# Patient Record
Sex: FEMALE | Race: WHITE | NOT HISPANIC OR LATINO | Employment: STUDENT | URBAN - METROPOLITAN AREA
[De-identification: names, ages, dates, MRNs, and addresses within clinical notes are randomized per-mention and may not be internally consistent; named-entity substitution may affect disease eponyms.]

---

## 2017-06-22 ENCOUNTER — ALLSCRIPTS OFFICE VISIT (OUTPATIENT)
Dept: OTHER | Facility: OTHER | Age: 27
End: 2017-06-22

## 2017-06-22 DIAGNOSIS — Z72.51 HIGH RISK HETEROSEXUAL BEHAVIOR: ICD-10-CM

## 2017-06-22 DIAGNOSIS — N91.2 AMENORRHEA: ICD-10-CM

## 2018-01-09 NOTE — MISCELLANEOUS
Message  Return to work or school:   Linn Carrera is under my professional care  She was seen in my office on 4/25/16   She is able to return to work on  4/30/16       BETSY Mcnair       Signatures   Electronically signed by : Braulio Beauchamp, Max Hernandez;  Apr 28 2016  1:01PM EST                       (Author)

## 2018-01-17 NOTE — PROGRESS NOTES
Assessment    1  Depression (311) (F32 9)   2  Anxiety (300 00) (F41 9)    Plan  Anxiety    · ClonazePAM 0 5 MG Oral Tablet; TAKE 1 TABLET TWICE DAILY AS NEEDED  Anxiety, Depression    · Citalopram Hydrobromide 10 MG Oral Tablet (CeleXA); TAKE 1 TABLET DAILY    Discussion/Summary  Discussion Summary:   Patient started on Celexa 10 mg  start with 1/2 daily, for one week then one a day  RTO in 4 weeks  Discussed stress relieving activities, written info given to patient  Refilled Klonopin, for 2 months  Chief Complaint  Chief Complaint Free Text Note Form: Pt here today to go over recent lab results and to discuss possibly changing her anxiety medication, she believes it is not working she stated that her anxiety is getting worse  History of Present Illness  HPI: 26year old AA female presents with c/o increased anxiety even with medication, taking Klonopin daily, but still wakes up panicked, and feels tight in the chest, with SOB  Still does not feel like anxiety under control with medication  Has not been able to find/get therapist, is on a waiting list  Has a 10year old boy  Crying lately, has trouble sleeping at night  Tries to pray, still feels low, past weighing heavy on patient  Patient does not get along with dad, and step mom  Patient 's mom  when patient was 9, close to [facility]  Review of Systems  Complete-Female:   ENT: no earache and no sore throat  Cardiovascular: no chest pain and no palpitations  Respiratory: no shortness of breath and no cough  Gastrointestinal: no abdominal pain, no nausea and no vomiting  Psychiatric: anxiety, sleep disturbances and depression, but not suicidal       Active Problems    1  Anxiety (300 00) (F41 9)   2  Depression (311) (F32 9)   3  Knee pain, acute, left (689 46) (M29 252)    Family History    1  Family history of Anxiety and depression   2  Family history of Breast cancer (174 9) (S41 377)   3   Family history of malignant neoplasm (V16 9) (Z80 9)    4  Family history of essential hypertension (V17 49) (Z82 49)   5  Family history of myocardial infarction (V17 3) (Z82 49)    Social History    · Never smoker   · Non drinker / no alcohol use    Current Meds   1  ClonazePAM 0 5 MG Oral Tablet; TAKE 1 TABLET TWICE DAILY AS NEEDED; Therapy: [id number] to (Evaluate:[id number]); Last Rx:[id number] Ordered    Allergies    1  No Known Drug Allergies    Vitals  Vital Signs [Data Includes: Current Encounter]    Recorded: [id number] 27:81UN   Systolic 588, LUE, Sitting   Diastolic 64, LUE, Sitting   Height 5 ft 6 in   Weight 134 lb    BMI Calculated 21 63   BSA Calculated 1 69     Physical Exam    Constitutional   General appearance: No acute distress, well appearing and well nourished  Pulmonary   Respiratory effort: No increased work of breathing or signs of respiratory distress  Auscultation of lungs: Clear to auscultation  Cardiovascular   Auscultation of heart: Normal rate and rhythm, normal S1 and S2, without murmurs  Examination of extremities for edema and/or varicosities: Normal     Musculoskeletal   Gait and station: Normal     Digits and nails: Normal without clubbing or cyanosis      Inspection/palpation of joints, bones, and muscles: Normal     Psychiatric   Orientation to person, place, and time: Normal     Mood and affect: Normal          Future Appointments    Date/Time Provider Specialty Site   02/15/2016 02:00 PM Román Marsh, [facility]     Signatures   Electronically signed by : Devin Soulier, [facility]; Yoav 15 2016 11:17AM EST                       (Author)    Electronically signed by : Armida Velázquez DO; Yoav 15 2016  1:08PM EST                       (Author)

## 2018-01-25 RX ORDER — CLONAZEPAM 0.5 MG/1
0.5 TABLET ORAL AS NEEDED
Refills: 0 | COMMUNITY
Start: 2017-12-19 | End: 2018-02-19 | Stop reason: SDUPTHER

## 2018-01-26 PROBLEM — N91.2 AMENORRHEA: Status: ACTIVE | Noted: 2017-06-22

## 2018-02-19 ENCOUNTER — OFFICE VISIT (OUTPATIENT)
Dept: FAMILY MEDICINE CLINIC | Facility: HOSPITAL | Age: 28
End: 2018-02-19
Payer: COMMERCIAL

## 2018-02-19 VITALS — BODY MASS INDEX: 23.57 KG/M2 | WEIGHT: 146 LBS | SYSTOLIC BLOOD PRESSURE: 100 MMHG | DIASTOLIC BLOOD PRESSURE: 70 MMHG

## 2018-02-19 DIAGNOSIS — Z00.00 HEALTH CARE MAINTENANCE: ICD-10-CM

## 2018-02-19 DIAGNOSIS — F41.9 ANXIETY: Primary | ICD-10-CM

## 2018-02-19 PROCEDURE — 99213 OFFICE O/P EST LOW 20 MIN: CPT | Performed by: PHYSICIAN ASSISTANT

## 2018-02-19 RX ORDER — CLONAZEPAM 0.5 MG/1
0.5 TABLET ORAL 3 TIMES DAILY
Qty: 90 TABLET | Refills: 2 | Status: SHIPPED | OUTPATIENT
Start: 2018-02-19 | End: 2018-05-16 | Stop reason: SDUPTHER

## 2018-02-19 NOTE — PROGRESS NOTES
Assessment/Plan:       Diagnoses and all orders for this visit:    Anxiety  -     clonazePAM (KlonoPIN) 0 5 mg tablet; Take 1 tablet (0 5 mg total) by mouth 3 (three) times a day  -     CBC and differential; Future  -     Comprehensive metabolic panel; Future  -     Lipid panel; Future  -     TSH, 3rd generation with T4 reflex; Future  -     Comprehensive metabolic panel  -     Lipid panel  -     TSH, 3rd generation with T4 reflex    Health care maintenance  -     CBC and differential; Future  -     Comprehensive metabolic panel; Future  -     Lipid panel; Future  -     TSH, 3rd generation with T4 reflex; Future  -     Comprehensive metabolic panel  -     Lipid panel  -     TSH, 3rd generation with T4 reflex        Subjective:      Patient ID: Day Goodwin is a 33 y o  female  33year old AA female presents for refills  Had bronchitis in August and went to urgent care for treatment  Presents with child today for follow up  No acute complaints  Patient is currently a full time student, doing very well, anxiety under control  [facility] to [facility] exams, last pap August 2017, LNMP- July 2017, due to getting Depo injections  Patient trying to slowly come off or reduce the amount of Klonopin used, trying to take one pill bid, and more so prn  Review of Systems   Constitutional: Negative  HENT: Negative  Eyes: Negative  Respiratory: Negative  Psychiatric/Behavioral: Negative for agitation, behavioral problems, confusion, decreased concentration, dysphoric mood, hallucinations, self-injury, sleep disturbance and suicidal ideas  The patient is nervous/anxious  The patient is not hyperactive  Objective: There were no vitals taken for this visit  Physical Exam   Constitutional: She is oriented to person, place, and time  She appears well-developed and well-nourished  No distress  HENT:   Head: Normocephalic and atraumatic     Eyes: Conjunctivae and EOM are normal  Right eye exhibits no discharge  Left eye exhibits no discharge  No scleral icterus  Cardiovascular: Normal rate, regular rhythm and normal heart sounds  No murmur heard  Pulmonary/Chest: No respiratory distress  She has no wheezes  She has no rales  Musculoskeletal: Normal range of motion  Neurological: She is alert and oriented to person, place, and time  Skin: She is not diaphoretic  Psychiatric: She has a normal mood and affect   Her behavior is normal  Judgment and thought content normal

## 2018-02-19 NOTE — PATIENT INSTRUCTIONS
Recommend routine complete fasting blood work  Given refills for Klonopin 1 tablet 3 times a day as needed number 90 with 2 refills  Patient to call office 1 week after blood drawn for results

## 2018-05-16 ENCOUNTER — TELEPHONE (OUTPATIENT)
Dept: FAMILY MEDICINE CLINIC | Facility: HOSPITAL | Age: 28
End: 2018-05-16

## 2018-05-16 DIAGNOSIS — F41.9 ANXIETY: ICD-10-CM

## 2018-05-16 RX ORDER — CLONAZEPAM 0.5 MG/1
0.5 TABLET ORAL 3 TIMES DAILY
Qty: 90 TABLET | Refills: 1 | OUTPATIENT
Start: 2018-05-16

## 2018-05-18 RX ORDER — CLONAZEPAM 0.5 MG/1
0.5 TABLET ORAL 3 TIMES DAILY
Qty: 90 TABLET | Refills: 0 | OUTPATIENT
Start: 2018-05-18 | End: 2018-06-18 | Stop reason: SDUPTHER

## 2018-06-18 ENCOUNTER — OFFICE VISIT (OUTPATIENT)
Dept: FAMILY MEDICINE CLINIC | Facility: HOSPITAL | Age: 28
End: 2018-06-18
Payer: COMMERCIAL

## 2018-06-18 VITALS
HEIGHT: 66 IN | WEIGHT: 150 LBS | BODY MASS INDEX: 24.11 KG/M2 | DIASTOLIC BLOOD PRESSURE: 64 MMHG | SYSTOLIC BLOOD PRESSURE: 98 MMHG | HEART RATE: 98 BPM

## 2018-06-18 DIAGNOSIS — F41.9 ANXIETY: ICD-10-CM

## 2018-06-18 PROCEDURE — 3725F SCREEN DEPRESSION PERFORMED: CPT | Performed by: PHYSICIAN ASSISTANT

## 2018-06-18 PROCEDURE — 99213 OFFICE O/P EST LOW 20 MIN: CPT | Performed by: PHYSICIAN ASSISTANT

## 2018-06-18 RX ORDER — CLONAZEPAM 0.5 MG/1
0.5 TABLET ORAL 3 TIMES DAILY
Qty: 90 TABLET | Refills: 5 | Status: SHIPPED | OUTPATIENT
Start: 2018-06-18 | End: 2018-07-16 | Stop reason: SDUPTHER
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
## 2018-07-16 ENCOUNTER — OFFICE VISIT (OUTPATIENT)
Dept: FAMILY MEDICINE CLINIC | Facility: HOSPITAL | Age: 28
End: 2018-07-16
Payer: COMMERCIAL

## 2018-07-16 VITALS
HEIGHT: 66 IN | WEIGHT: 150 LBS | BODY MASS INDEX: 24.11 KG/M2 | DIASTOLIC BLOOD PRESSURE: 70 MMHG | HEART RATE: 79 BPM | SYSTOLIC BLOOD PRESSURE: 96 MMHG

## 2018-07-16 DIAGNOSIS — F41.9 ANXIETY: ICD-10-CM

## 2018-07-16 DIAGNOSIS — F32.A DEPRESSION, UNSPECIFIED DEPRESSION TYPE: Primary | ICD-10-CM

## 2018-07-16 DIAGNOSIS — R45.1 AGITATION: ICD-10-CM

## 2018-07-16 PROCEDURE — 3008F BODY MASS INDEX DOCD: CPT | Performed by: PHYSICIAN ASSISTANT

## 2018-07-16 PROCEDURE — 99214 OFFICE O/P EST MOD 30 MIN: CPT | Performed by: PHYSICIAN ASSISTANT

## 2018-07-16 RX ORDER — CLONIDINE HYDROCHLORIDE 0.1 MG/1
0.1 TABLET ORAL
Qty: 30 TABLET | Refills: 2 | Status: SHIPPED | OUTPATIENT
Start: 2018-07-16 | End: 2018-11-05 | Stop reason: SDUPTHER

## 2018-07-16 RX ORDER — CLONAZEPAM 0.5 MG/1
0.5 TABLET ORAL 3 TIMES DAILY
Qty: 90 TABLET | Refills: 3 | Status: SHIPPED | OUTPATIENT
Start: 2018-07-16 | End: 2018-08-13 | Stop reason: SDUPTHER

## 2018-07-16 RX ORDER — BUPROPION HYDROCHLORIDE 150 MG/1
150 TABLET ORAL DAILY
Qty: 30 TABLET | Refills: 2 | Status: SHIPPED | OUTPATIENT
Start: 2018-07-16 | End: 2018-10-01 | Stop reason: SDUPTHER

## 2018-07-16 NOTE — PATIENT INSTRUCTIONS
Discussed different medications for sx  Follow up with [facility] to have forms filled out  Start Wellbutrin  mg  Qam, and prn Clonidine for increased agitation  Continue Klonopin

## 2018-07-16 NOTE — PROGRESS NOTES
Assessment/Plan:         Diagnoses and all orders for this visit:    Depression, unspecified depression type  -     buPROPion (WELLBUTRIN XL) 150 mg 24 hr tablet; Take 1 tablet (150 mg total) by mouth daily    Agitation  -     cloNIDine (CATAPRES) 0 1 mg tablet; Take 1 tablet (0 1 mg total) by mouth daily at bedtime as needed for high blood pressure One tab  Qhs, prn agitation  Anxiety  -     clonazePAM (KlonoPIN) 0 5 mg tablet; Take 1 tablet (0 5 mg total) by mouth 3 (three) times a day        Subjective:      Patient ID: Kristina Velarde is a 33 y o  female  33year old AA female presents with increasing anxiety  Had panic attack recently- July 2nd ,   and had suicidal thoughts last week, while in classes  Plans to re-start counseling and going to [facility]  Feeling overwhelmed with family stressors, and school/work/single mom responsibilities  Has papers for disability due to mental health issues, wondering if we can fill them out  Plans changes, no longer moving to [facility] [address] [facility]  Anxiety   Symptoms include nausea, nervous/anxious behavior and suicidal ideas  Patient reports no confusion, decreased concentration or shortness of breath  Review of Systems   Constitutional: Positive for appetite change and fatigue  Negative for chills, diaphoresis and fever  Respiratory: Positive for chest tightness  Negative for cough and shortness of breath  Symptoms due to anxiety  Gastrointestinal: Positive for nausea  Negative for abdominal pain and vomiting  Nausea intermittent  Psychiatric/Behavioral: Positive for agitation, self-injury and suicidal ideas  Negative for confusion, decreased concentration and sleep disturbance  The patient is nervous/anxious  Few weeks ago- had thoughts of suicide, not currently            Objective:      BP 96/70   Pulse 79   Ht 5' 6" (1 676 m)   Wt 68 kg (150 lb)   BMI 24 21 kg/m²          Physical Exam Constitutional: She is oriented to person, place, and time  She appears well-developed and well-nourished  No distress  Cardiovascular: Normal rate, regular rhythm and normal heart sounds  Pulmonary/Chest: Effort normal and breath sounds normal  No respiratory distress  She has no wheezes  She has no rales  Neurological: She is alert and oriented to person, place, and time  Skin: She is not diaphoretic  Psychiatric: She has a normal mood and affect   Her behavior is normal  Judgment and thought content normal

## 2018-08-13 DIAGNOSIS — F41.9 ANXIETY: ICD-10-CM

## 2018-08-13 RX ORDER — CLONAZEPAM 0.5 MG/1
0.5 TABLET ORAL 3 TIMES DAILY
Qty: 90 TABLET | Refills: 0 | OUTPATIENT
Start: 2018-08-13 | End: 2018-10-01 | Stop reason: SDUPTHER

## 2018-10-01 ENCOUNTER — OFFICE VISIT (OUTPATIENT)
Dept: FAMILY MEDICINE CLINIC | Facility: HOSPITAL | Age: 28
End: 2018-10-01
Payer: COMMERCIAL

## 2018-10-01 VITALS
BODY MASS INDEX: 24.99 KG/M2 | WEIGHT: 155.5 LBS | OXYGEN SATURATION: 97 % | SYSTOLIC BLOOD PRESSURE: 110 MMHG | HEART RATE: 78 BPM | DIASTOLIC BLOOD PRESSURE: 86 MMHG | TEMPERATURE: 98.7 F | HEIGHT: 66 IN

## 2018-10-01 DIAGNOSIS — F41.9 ANXIETY: ICD-10-CM

## 2018-10-01 DIAGNOSIS — F32.A DEPRESSION, UNSPECIFIED DEPRESSION TYPE: ICD-10-CM

## 2018-10-01 PROCEDURE — 99213 OFFICE O/P EST LOW 20 MIN: CPT | Performed by: PHYSICIAN ASSISTANT

## 2018-10-01 RX ORDER — CLONAZEPAM 0.5 MG/1
0.5 TABLET ORAL 3 TIMES DAILY
Qty: 90 TABLET | Refills: 2 | Status: SHIPPED | OUTPATIENT
Start: 2018-10-01 | End: 2019-01-08 | Stop reason: SDUPTHER

## 2018-10-01 RX ORDER — BUPROPION HYDROCHLORIDE 150 MG/1
150 TABLET ORAL DAILY
Qty: 30 TABLET | Refills: 0 | Status: SHIPPED | OUTPATIENT
Start: 2018-10-01 | End: 2020-08-21 | Stop reason: SDUPTHER

## 2018-10-01 NOTE — PROGRESS NOTES
Assessment/Plan:         Diagnoses and all orders for this visit:    Anxiety  -     clonazePAM (KlonoPIN) 0 5 mg tablet; Take 1 tablet (0 5 mg total) by mouth 3 (three) times a day    Depression, unspecified depression type  -     buPROPion (WELLBUTRIN XL) 150 mg 24 hr tablet; Take 1 tablet (150 mg total) by mouth daily        Subjective:      Patient ID: Makenzie Fraire is a 33 y o  female  33year old AA female presents with c/o worsening anxiety, getting panic attacks past 3 weeks  The Klonopin not helping, anxiety affecting job performance  Feeling less depressed  Has no reason to be anxious  Has chest discomfort due to anxiety, "scared for no reason"  Seeing therapist, using breathing exercises; Not happy with current therapist     Patient is on leave from work due to anxiety, anxiety affecting work performance  Has been out of work since 9/20/18, due to having panic attack at work  Energy overwhelmed and having SOB, at work  Patient has an 6year old son, and has ongoing family/father issues  Anxiety   Symptoms include confusion, decreased concentration, nervous/anxious behavior and shortness of breath  Patient reports no dizziness, nausea or suicidal ideas  Review of Systems   Respiratory: Positive for chest tightness and shortness of breath  Negative for cough  Gastrointestinal: Negative for abdominal pain, nausea and vomiting  Neurological: Negative for dizziness, tremors, light-headedness, numbness and headaches  Psychiatric/Behavioral: Positive for confusion, decreased concentration and dysphoric mood  Negative for self-injury, sleep disturbance and suicidal ideas  The patient is nervous/anxious  Objective:      /86   Pulse 78   Temp 98 7 °F (37 1 °C) (Tympanic)   Ht 5' 6" (1 676 m)   Wt 70 5 kg (155 lb 8 oz)   SpO2 97%   BMI 25 10 kg/m²          Physical Exam   Constitutional: She is oriented to person, place, and time   She appears well-developed and well-nourished  No distress  HENT:   Head: Normocephalic and atraumatic  Cardiovascular: Normal rate, regular rhythm and normal heart sounds  Pulmonary/Chest: Effort normal and breath sounds normal  No respiratory distress  She has no wheezes  She has no rales  Musculoskeletal: Normal range of motion  She exhibits no edema  Neurological: She is alert and oriented to person, place, and time  Skin: She is not diaphoretic  Psychiatric: She has a normal mood and affect  Her behavior is normal  Judgment and thought content normal    Very anxious, and talkative  Nursing note and vitals reviewed

## 2018-10-01 NOTE — PATIENT INSTRUCTIONS
Recommend increasing Klonopin to 2 tabs  In the morning and one at noon  Continue other medications  Note given to return to work on Monday, October 8th  Patient to continue therapy, and work on outlets, and social activities

## 2018-10-02 ENCOUNTER — TELEPHONE (OUTPATIENT)
Dept: FAMILY MEDICINE CLINIC | Facility: HOSPITAL | Age: 28
End: 2018-10-02

## 2018-10-02 NOTE — TELEPHONE ENCOUNTER
Patient told dose, same, just to take 2 tabs  In the morning, and one at noon  Only Clonidine at night/bedtime  Call office after one week if dose adjustment not working for controlling stress

## 2018-11-05 DIAGNOSIS — R45.1 AGITATION: ICD-10-CM

## 2018-11-05 RX ORDER — CLONIDINE HYDROCHLORIDE 0.1 MG/1
0.1 TABLET ORAL
Qty: 30 TABLET | Refills: 1 | Status: SHIPPED | OUTPATIENT
Start: 2018-11-05 | End: 2019-01-08 | Stop reason: SDUPTHER

## 2018-11-07 ENCOUNTER — CLINICAL SUPPORT (OUTPATIENT)
Dept: FAMILY MEDICINE CLINIC | Facility: HOSPITAL | Age: 28
End: 2018-11-07
Payer: COMMERCIAL

## 2018-11-07 DIAGNOSIS — Z11.1 PPD SCREENING TEST: Primary | ICD-10-CM

## 2018-11-07 PROCEDURE — 86580 TB INTRADERMAL TEST: CPT

## 2018-11-28 ENCOUNTER — OFFICE VISIT (OUTPATIENT)
Dept: FAMILY MEDICINE CLINIC | Facility: HOSPITAL | Age: 28
End: 2018-11-28
Payer: COMMERCIAL

## 2018-11-28 VITALS
BODY MASS INDEX: 24.43 KG/M2 | HEIGHT: 66 IN | OXYGEN SATURATION: 99 % | DIASTOLIC BLOOD PRESSURE: 64 MMHG | WEIGHT: 152 LBS | HEART RATE: 87 BPM | SYSTOLIC BLOOD PRESSURE: 110 MMHG | TEMPERATURE: 98.2 F

## 2018-11-28 DIAGNOSIS — J04.0 LARYNGITIS: Primary | ICD-10-CM

## 2018-11-28 DIAGNOSIS — J32.9 SINUSITIS, UNSPECIFIED CHRONICITY, UNSPECIFIED LOCATION: ICD-10-CM

## 2018-11-28 DIAGNOSIS — R05.9 COUGH: ICD-10-CM

## 2018-11-28 PROCEDURE — 3008F BODY MASS INDEX DOCD: CPT | Performed by: PHYSICIAN ASSISTANT

## 2018-11-28 PROCEDURE — 1036F TOBACCO NON-USER: CPT | Performed by: PHYSICIAN ASSISTANT

## 2018-11-28 PROCEDURE — 99213 OFFICE O/P EST LOW 20 MIN: CPT | Performed by: PHYSICIAN ASSISTANT

## 2018-11-28 RX ORDER — AZITHROMYCIN 250 MG/1
TABLET, FILM COATED ORAL
Qty: 6 TABLET | Refills: 0 | Status: SHIPPED | OUTPATIENT
Start: 2018-11-28 | End: 2018-12-02

## 2018-11-28 RX ORDER — BENZONATATE 200 MG/1
200 CAPSULE ORAL 3 TIMES DAILY PRN
Qty: 20 CAPSULE | Refills: 0 | Status: SHIPPED | OUTPATIENT
Start: 2018-11-28 | End: 2018-12-28 | Stop reason: ALTCHOICE

## 2018-11-28 NOTE — PATIENT INSTRUCTIONS
Recommend Zithromax 250 mg  Z pack  Increase water intake, and try saline nasal flushes  Can try tessalon to suppress cough, as needed, (unable to take products with codeine)

## 2018-11-28 NOTE — PROGRESS NOTES
Assessment/Plan:         Diagnoses and all orders for this visit:    Laryngitis  -     azithromycin (ZITHROMAX) 250 mg tablet; Take 2 tablets today then 1 tablet daily x 4 days    Sinusitis, unspecified chronicity, unspecified location  -     azithromycin (ZITHROMAX) 250 mg tablet; Take 2 tablets today then 1 tablet daily x 4 days    Cough  -     benzonatate (TESSALON) 200 MG capsule; Take 1 capsule (200 mg total) by mouth 3 (three) times a day as needed for cough        Subjective:      Patient ID: Denice Casarez is a 33 y o  female  33year old AA female c/o sore throat and congestion since past Saturday night  Has a cough and SOB also, losing voice, and headache  Cough   Associated symptoms include chills, ear pain, a fever, headaches, rhinorrhea, a sore throat and shortness of breath  Shortness of Breath   Associated symptoms include ear pain, a fever, headaches, rhinorrhea and a sore throat  Review of Systems   Constitutional: Positive for chills, diaphoresis, fatigue and fever  HENT: Positive for congestion, ear pain, rhinorrhea, sore throat and voice change  Respiratory: Positive for cough and shortness of breath  Neurological: Positive for light-headedness and headaches  Negative for dizziness  Objective:      /64   Pulse 87   Temp 98 2 °F (36 8 °C) (Tympanic)   Ht 5' 6" (1 676 m)   Wt 68 9 kg (152 lb)   SpO2 99%   BMI 24 53 kg/m²          Physical Exam   Constitutional: She is oriented to person, place, and time  She appears well-developed and well-nourished  No distress  HENT:   Head: Normocephalic and atraumatic  Right Ear: External ear normal    Left Ear: External ear normal    Mouth/Throat: Oropharynx is clear and moist  No oropharyngeal exudate  Turbinates inflamed, left side kissing  Eyes: Conjunctivae and EOM are normal  Right eye exhibits no discharge  Left eye exhibits no discharge  No scleral icterus     Pulmonary/Chest: Effort normal and breath sounds normal  No respiratory distress  She has no wheezes  She has no rales  Neurological: She is alert and oriented to person, place, and time  Skin: She is not diaphoretic  Nursing note and vitals reviewed

## 2018-12-28 ENCOUNTER — OFFICE VISIT (OUTPATIENT)
Dept: FAMILY MEDICINE CLINIC | Facility: HOSPITAL | Age: 28
End: 2018-12-28
Payer: COMMERCIAL

## 2018-12-28 VITALS
SYSTOLIC BLOOD PRESSURE: 116 MMHG | HEART RATE: 86 BPM | BODY MASS INDEX: 24.43 KG/M2 | HEIGHT: 66 IN | DIASTOLIC BLOOD PRESSURE: 80 MMHG | WEIGHT: 152 LBS

## 2018-12-28 DIAGNOSIS — F33.9 EPISODE OF RECURRENT MAJOR DEPRESSIVE DISORDER, UNSPECIFIED DEPRESSION EPISODE SEVERITY (HCC): ICD-10-CM

## 2018-12-28 DIAGNOSIS — F41.9 ANXIETY: Primary | ICD-10-CM

## 2018-12-28 PROCEDURE — 99213 OFFICE O/P EST LOW 20 MIN: CPT | Performed by: PHYSICIAN ASSISTANT

## 2018-12-28 PROCEDURE — 1036F TOBACCO NON-USER: CPT | Performed by: PHYSICIAN ASSISTANT

## 2018-12-28 RX ORDER — CLONAZEPAM 1 MG/1
1 TABLET ORAL 2 TIMES DAILY
Qty: 20 TABLET | Refills: 0 | Status: SHIPPED | OUTPATIENT
Start: 2018-12-28 | End: 2019-02-05 | Stop reason: ALTCHOICE
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
## 2018-12-28 NOTE — PATIENT INSTRUCTIONS
Patient warned about taking more Klonopin  Given short course of Klonopin 1mg  Bid, 10 days, and told to continue 0 5mg tid, (max dose), but to use 1 mg  As needed for back up, and can cut 1 mg  In half if needed  Given excuse for work, to go back 1/7/19  Referred to get therapy/psyc

## 2019-01-03 ENCOUNTER — OFFICE VISIT (OUTPATIENT)
Dept: FAMILY MEDICINE CLINIC | Facility: HOSPITAL | Age: 29
End: 2019-01-03
Payer: COMMERCIAL

## 2019-01-03 VITALS
HEART RATE: 72 BPM | WEIGHT: 151 LBS | TEMPERATURE: 99 F | BODY MASS INDEX: 24.27 KG/M2 | RESPIRATION RATE: 16 BRPM | SYSTOLIC BLOOD PRESSURE: 122 MMHG | DIASTOLIC BLOOD PRESSURE: 78 MMHG | HEIGHT: 66 IN

## 2019-01-03 DIAGNOSIS — J02.9 PHARYNGITIS, UNSPECIFIED ETIOLOGY: Primary | ICD-10-CM

## 2019-01-03 PROCEDURE — 99213 OFFICE O/P EST LOW 20 MIN: CPT | Performed by: PHYSICIAN ASSISTANT

## 2019-01-03 RX ORDER — AMOXICILLIN 500 MG/1
500 TABLET, FILM COATED ORAL
Qty: 30 TABLET | Refills: 0 | Status: SHIPPED | OUTPATIENT
Start: 2019-01-03 | End: 2019-01-13

## 2019-01-03 NOTE — PROGRESS NOTES
Assessment/Plan:         Diagnoses and all orders for this visit:    Pharyngitis, unspecified etiology  -     amoxicillin (AMOXIL) 500 MG tablet; Take 1 tablet (500 mg total) by mouth 3 (three) times a day with meals for 10 days        Subjective:      Patient ID: April Jose is a 33 y o  female  33year old AA female c/o sore throat past 3 days, getting worse  Has a headache  Checked in with [facility], for mental health support/program, due to severe anxiety  Tried Robitussin and otc meds  Without help  Review of Systems   Constitutional: Positive for diaphoresis, fatigue and fever  Negative for chills  HENT: Positive for congestion, ear pain, rhinorrhea, sore throat and voice change  Respiratory: Positive for cough  Negative for shortness of breath  Objective:      /78   Pulse 72   Temp 99 °F (37 2 °C) (Tympanic)   Resp 16   Ht 5' 6" (1 676 m)   Wt 68 5 kg (151 lb)   BMI 24 37 kg/m²          Physical Exam   Constitutional: She is oriented to person, place, and time  She appears well-developed and well-nourished  No distress  HENT:   Head: Normocephalic and atraumatic  Right Ear: External ear normal    Left Ear: External ear normal    Nose: Nose normal    Pharynx erythematous  Eyes: Conjunctivae and EOM are normal  Right eye exhibits no discharge  Left eye exhibits no discharge  No scleral icterus  Pulmonary/Chest: Effort normal and breath sounds normal  No respiratory distress  She has no wheezes  She has no rales  Neurological: She is alert and oriented to person, place, and time  Skin: She is not diaphoretic  Nursing note and vitals reviewed

## 2019-01-08 DIAGNOSIS — F41.9 ANXIETY: ICD-10-CM

## 2019-01-08 DIAGNOSIS — R45.1 AGITATION: ICD-10-CM

## 2019-01-08 RX ORDER — CLONIDINE HYDROCHLORIDE 0.1 MG/1
0.1 TABLET ORAL
Qty: 30 TABLET | Refills: 0 | Status: SHIPPED | OUTPATIENT
Start: 2019-01-08 | End: 2019-02-05 | Stop reason: SDUPTHER

## 2019-01-08 RX ORDER — CLONAZEPAM 0.5 MG/1
0.5 TABLET ORAL 3 TIMES DAILY
Qty: 90 TABLET | Refills: 0 | Status: SHIPPED | OUTPATIENT
Start: 2019-01-08 | End: 2019-02-05 | Stop reason: SDUPTHER

## 2019-02-05 ENCOUNTER — TELEPHONE (OUTPATIENT)
Dept: FAMILY MEDICINE CLINIC | Facility: HOSPITAL | Age: 29
End: 2019-02-05

## 2019-02-05 DIAGNOSIS — F41.9 ANXIETY: ICD-10-CM

## 2019-02-05 DIAGNOSIS — R45.1 AGITATION: ICD-10-CM

## 2019-02-05 RX ORDER — CLONIDINE HYDROCHLORIDE 0.1 MG/1
0.1 TABLET ORAL
Qty: 30 TABLET | Refills: 3 | Status: SHIPPED | OUTPATIENT
Start: 2019-02-05 | End: 2019-02-05 | Stop reason: SDUPTHER

## 2019-02-05 RX ORDER — CLONAZEPAM 0.5 MG/1
0.5 TABLET ORAL 3 TIMES DAILY
Qty: 90 TABLET | Refills: 2 | Status: SHIPPED | OUTPATIENT
Start: 2019-02-05 | End: 2019-03-04 | Stop reason: SDUPTHER

## 2019-02-05 RX ORDER — CLONIDINE HYDROCHLORIDE 0.1 MG/1
0.1 TABLET ORAL
Qty: 30 TABLET | Refills: 3 | Status: SHIPPED | OUTPATIENT
Start: 2019-02-05 | End: 2019-03-04 | Stop reason: SDUPTHER

## 2019-02-05 NOTE — TELEPHONE ENCOUNTER
[facility] calling about Catapres instructions  In the signature line it states 1 at bedtime for hypertension and also 1 at bedtime for agitation  They need to know which one it is for  Please advise

## 2019-03-04 ENCOUNTER — OFFICE VISIT (OUTPATIENT)
Dept: FAMILY MEDICINE CLINIC | Facility: HOSPITAL | Age: 29
End: 2019-03-04
Payer: COMMERCIAL

## 2019-03-04 VITALS
HEART RATE: 80 BPM | SYSTOLIC BLOOD PRESSURE: 98 MMHG | OXYGEN SATURATION: 98 % | HEIGHT: 66 IN | WEIGHT: 153 LBS | DIASTOLIC BLOOD PRESSURE: 60 MMHG | BODY MASS INDEX: 24.59 KG/M2

## 2019-03-04 DIAGNOSIS — R45.1 AGITATION: ICD-10-CM

## 2019-03-04 DIAGNOSIS — F41.9 ANXIETY: ICD-10-CM

## 2019-03-04 PROCEDURE — 99213 OFFICE O/P EST LOW 20 MIN: CPT | Performed by: PHYSICIAN ASSISTANT

## 2019-03-04 PROCEDURE — 1036F TOBACCO NON-USER: CPT | Performed by: PHYSICIAN ASSISTANT

## 2019-03-04 PROCEDURE — 3008F BODY MASS INDEX DOCD: CPT | Performed by: PHYSICIAN ASSISTANT

## 2019-03-04 RX ORDER — CLONAZEPAM 1 MG/1
1 TABLET ORAL 2 TIMES DAILY
Qty: 60 TABLET | Refills: 2 | Status: SHIPPED | OUTPATIENT
Start: 2019-03-04 | End: 2019-05-20 | Stop reason: SDUPTHER

## 2019-03-04 RX ORDER — CLONIDINE HYDROCHLORIDE 0.2 MG/1
0.2 TABLET ORAL
Qty: 30 TABLET | Refills: 3 | Status: SHIPPED | OUTPATIENT
Start: 2019-03-04 | End: 2019-06-12 | Stop reason: SDUPTHER

## 2019-03-04 NOTE — PROGRESS NOTES
Assessment/Plan:         Diagnoses and all orders for this visit:    Anxiety  -     clonazePAM (KlonoPIN) 1 mg tablet; Take 1 tablet (1 mg total) by mouth 2 (two) times a day    Agitation  -     cloNIDine (CATAPRES) 0 2 mg tablet; Take 1 tablet (0 2 mg total) by mouth daily at bedtime as needed (for agitation ) One tab  Qhs, prn agitation  Subjective:      Patient ID: Darien Freeman is a 33 y o  female  33year old AA female presents due to increased stress and depression  Work and personal stress, cutting patient's hours and giving her bad shifts  Seeing therapist/counselor, going to [facility], trying to exercise more, and doing yoga/meditation  Review of Systems   Respiratory: Negative for cough and shortness of breath  Cardiovascular: Negative for chest pain  Gastrointestinal: Positive for abdominal pain, diarrhea and nausea  Negative for constipation and vomiting  Today no pain, but recently had stomach pain and nausea  Had diarrhea last week  Psychiatric/Behavioral: Positive for sleep disturbance  Negative for agitation, decreased concentration, self-injury and suicidal ideas  The patient is nervous/anxious  Objective:      BP 98/60   Pulse 80   Ht 5' 6" (1 676 m)   Wt 69 4 kg (153 lb)   LMP  (LMP Unknown)   SpO2 98%   BMI 24 69 kg/m²          Physical Exam   Constitutional: She is oriented to person, place, and time  She appears well-developed and well-nourished  No distress  Pulmonary/Chest: Effort normal and breath sounds normal  No stridor  No respiratory distress  She has no wheezes  She has no rales  Neurological: She is alert and oriented to person, place, and time  Skin: She is not diaphoretic  Psychiatric: She has a normal mood and affect  Her behavior is normal  Judgment and thought content normal    Patient appears hyper, very talkative, cooperative and happy/stable

## 2019-04-08 ENCOUNTER — OFFICE VISIT (OUTPATIENT)
Dept: FAMILY MEDICINE CLINIC | Facility: HOSPITAL | Age: 29
End: 2019-04-08
Payer: COMMERCIAL

## 2019-04-08 VITALS
OXYGEN SATURATION: 98 % | HEIGHT: 66 IN | DIASTOLIC BLOOD PRESSURE: 80 MMHG | WEIGHT: 150 LBS | SYSTOLIC BLOOD PRESSURE: 108 MMHG | TEMPERATURE: 99.1 F | HEART RATE: 74 BPM | BODY MASS INDEX: 24.11 KG/M2

## 2019-04-08 DIAGNOSIS — J32.9 SINUSITIS, UNSPECIFIED CHRONICITY, UNSPECIFIED LOCATION: Primary | ICD-10-CM

## 2019-04-08 DIAGNOSIS — R04.0 EPISTAXIS: ICD-10-CM

## 2019-04-08 DIAGNOSIS — R42 DIZZINESS: ICD-10-CM

## 2019-04-08 PROCEDURE — 3008F BODY MASS INDEX DOCD: CPT | Performed by: PHYSICIAN ASSISTANT

## 2019-04-08 PROCEDURE — 1036F TOBACCO NON-USER: CPT | Performed by: PHYSICIAN ASSISTANT

## 2019-04-08 PROCEDURE — 99213 OFFICE O/P EST LOW 20 MIN: CPT | Performed by: PHYSICIAN ASSISTANT

## 2019-04-08 RX ORDER — CEFUROXIME AXETIL 250 MG/1
250 TABLET ORAL EVERY 12 HOURS SCHEDULED
Qty: 20 TABLET | Refills: 0 | Status: SHIPPED | OUTPATIENT
Start: 2019-04-08 | End: 2019-04-18

## 2019-05-20 DIAGNOSIS — F41.9 ANXIETY: ICD-10-CM

## 2019-05-21 RX ORDER — CLONAZEPAM 1 MG/1
1 TABLET ORAL 2 TIMES DAILY
Qty: 60 TABLET | Refills: 2 | Status: SHIPPED | OUTPATIENT
Start: 2019-05-21 | End: 2019-05-24

## 2019-05-23 DIAGNOSIS — F41.9 ANXIETY: ICD-10-CM

## 2019-05-24 ENCOUNTER — OFFICE VISIT (OUTPATIENT)
Dept: FAMILY MEDICINE CLINIC | Facility: HOSPITAL | Age: 29
End: 2019-05-24
Payer: COMMERCIAL

## 2019-05-24 VITALS
HEART RATE: 83 BPM | HEIGHT: 66 IN | DIASTOLIC BLOOD PRESSURE: 70 MMHG | BODY MASS INDEX: 23.46 KG/M2 | SYSTOLIC BLOOD PRESSURE: 98 MMHG | WEIGHT: 146 LBS

## 2019-05-24 DIAGNOSIS — F41.9 ANXIETY: Primary | ICD-10-CM

## 2019-05-24 PROCEDURE — 3008F BODY MASS INDEX DOCD: CPT | Performed by: PHYSICIAN ASSISTANT

## 2019-05-24 PROCEDURE — 1036F TOBACCO NON-USER: CPT | Performed by: PHYSICIAN ASSISTANT

## 2019-05-24 PROCEDURE — 99213 OFFICE O/P EST LOW 20 MIN: CPT | Performed by: PHYSICIAN ASSISTANT

## 2019-05-24 RX ORDER — CLONAZEPAM 0.5 MG/1
TABLET ORAL
Qty: 90 TABLET | Refills: 2 | Status: SHIPPED | OUTPATIENT
Start: 2019-05-24 | End: 2019-09-30 | Stop reason: ALTCHOICE

## 2019-05-24 RX ORDER — CLONAZEPAM 0.5 MG/1
0.5 TABLET ORAL 3 TIMES DAILY PRN
Qty: 90 TABLET | Refills: 1 | Status: SHIPPED | OUTPATIENT
Start: 2019-05-24 | End: 2019-08-27 | Stop reason: SDUPTHER

## 2019-06-12 DIAGNOSIS — R45.1 AGITATION: ICD-10-CM

## 2019-06-12 RX ORDER — CLONIDINE HYDROCHLORIDE 0.2 MG/1
0.2 TABLET ORAL
Qty: 30 TABLET | Refills: 3 | Status: SHIPPED | OUTPATIENT
Start: 2019-06-12 | End: 2019-08-27 | Stop reason: SDUPTHER

## 2019-08-27 DIAGNOSIS — F41.9 ANXIETY: ICD-10-CM

## 2019-08-27 DIAGNOSIS — R45.1 AGITATION: ICD-10-CM

## 2019-08-27 RX ORDER — CLONIDINE HYDROCHLORIDE 0.2 MG/1
0.2 TABLET ORAL
Qty: 30 TABLET | Refills: 3 | Status: SHIPPED | OUTPATIENT
Start: 2019-08-27 | End: 2020-01-22 | Stop reason: SDUPTHER

## 2019-08-27 RX ORDER — CLONAZEPAM 0.5 MG/1
0.5 TABLET ORAL 3 TIMES DAILY PRN
Qty: 90 TABLET | Refills: 1 | Status: SHIPPED | OUTPATIENT
Start: 2019-08-27 | End: 2019-09-30 | Stop reason: SDUPTHER

## 2019-09-28 RX ORDER — CLONIDINE HYDROCHLORIDE 0.1 MG/1
TABLET ORAL
Refills: 0 | COMMUNITY
Start: 2019-07-24 | End: 2019-09-30 | Stop reason: ALTCHOICE

## 2019-09-28 RX ORDER — CLONAZEPAM 1 MG/1
TABLET ORAL
Refills: 0 | COMMUNITY
Start: 2019-07-24 | End: 2019-09-30 | Stop reason: SDUPTHER

## 2019-09-30 ENCOUNTER — OFFICE VISIT (OUTPATIENT)
Dept: FAMILY MEDICINE CLINIC | Facility: HOSPITAL | Age: 29
End: 2019-09-30
Payer: COMMERCIAL

## 2019-09-30 VITALS
HEIGHT: 66 IN | BODY MASS INDEX: 21.83 KG/M2 | TEMPERATURE: 99 F | SYSTOLIC BLOOD PRESSURE: 98 MMHG | DIASTOLIC BLOOD PRESSURE: 70 MMHG | HEART RATE: 81 BPM | WEIGHT: 135.8 LBS

## 2019-09-30 DIAGNOSIS — F41.9 ANXIETY: Primary | ICD-10-CM

## 2019-09-30 DIAGNOSIS — J32.9 SINUSITIS, UNSPECIFIED CHRONICITY, UNSPECIFIED LOCATION: ICD-10-CM

## 2019-09-30 PROCEDURE — 99214 OFFICE O/P EST MOD 30 MIN: CPT | Performed by: PHYSICIAN ASSISTANT

## 2019-09-30 RX ORDER — CEFUROXIME AXETIL 250 MG/1
250 TABLET ORAL EVERY 12 HOURS SCHEDULED
Qty: 20 TABLET | Refills: 0 | Status: SHIPPED | OUTPATIENT
Start: 2019-09-30 | End: 2019-10-10

## 2019-09-30 RX ORDER — CLONAZEPAM 1 MG/1
1 TABLET ORAL 2 TIMES DAILY PRN
Qty: 60 TABLET | Refills: 2 | Status: SHIPPED | OUTPATIENT
Start: 2019-09-30 | End: 2019-12-27 | Stop reason: SDUPTHER

## 2019-09-30 NOTE — PATIENT INSTRUCTIONS
Will increase dose of Klonopin to 1 mg  Bid, and patient to stat Ceftin 250 mg  Bid for 10 days  Increase water intake, and consider saline nasal flushes

## 2019-09-30 NOTE — PROGRESS NOTES
Assessment/Plan:         Diagnoses and all orders for this visit:    Anxiety  -     clonazePAM (KlonoPIN) 1 mg tablet; Take 1 tablet (1 mg total) by mouth 2 (two) times a day as needed for seizures    Sinusitis, unspecified chronicity, unspecified location  -     cefuroxime (CEFTIN) 250 mg tablet; Take 1 tablet (250 mg total) by mouth every 12 (twelve) hours for 10 days    Other orders        Subjective:      Patient ID: Ming Oneil is a 33 y o  female  33year old AA female c/o sore throat, runny nose,  tired, nausea and vomiting, past few weeks  [facility]  and had negative pregnancy test, and started getting Depo provera injection  Feeling more anxious than usual, requesting increasing dose of Klonopin  Review of Systems   Constitutional: Positive for diaphoresis and fatigue  Negative for chills and fever  HENT: Positive for congestion, rhinorrhea, sinus pressure, sinus pain and sore throat  Negative for ear pain  Respiratory: Positive for cough and shortness of breath  Neurological: Positive for dizziness, light-headedness and headaches  Psychiatric/Behavioral: Negative for dysphoric mood  The patient is nervous/anxious  Objective:      BP 98/70   Pulse 81   Temp 99 °F (37 2 °C) (Tympanic)   Ht 5' 6" (1 676 m)   Wt 61 6 kg (135 lb 12 8 oz)   BMI 21 92 kg/m²          Physical Exam   Constitutional: She is oriented to person, place, and time  She appears well-developed and well-nourished  No distress  HENT:   Head: Normocephalic and atraumatic  Mouth/Throat: Oropharynx is clear and moist  No oropharyngeal exudate  TM bulging and erythematous  Pulmonary/Chest: Effort normal and breath sounds normal  No stridor  No respiratory distress  She has no wheezes  She has no rales  Neurological: She is alert and oriented to person, place, and time  Skin: She is not diaphoretic  Psychiatric: She has a normal mood and affect   Her behavior is normal  Judgment and thought content normal    Nursing note and vitals reviewed

## 2019-10-18 ENCOUNTER — OFFICE VISIT (OUTPATIENT)
Dept: FAMILY MEDICINE CLINIC | Facility: HOSPITAL | Age: 29
End: 2019-10-18
Payer: COMMERCIAL

## 2019-10-18 VITALS
HEIGHT: 66 IN | RESPIRATION RATE: 16 BRPM | HEART RATE: 80 BPM | SYSTOLIC BLOOD PRESSURE: 100 MMHG | WEIGHT: 133 LBS | BODY MASS INDEX: 21.38 KG/M2 | DIASTOLIC BLOOD PRESSURE: 78 MMHG

## 2019-10-18 DIAGNOSIS — S61.012D LACERATION OF LEFT THUMB WITHOUT FOREIGN BODY WITHOUT DAMAGE TO NAIL, SUBSEQUENT ENCOUNTER: ICD-10-CM

## 2019-10-18 PROCEDURE — 99213 OFFICE O/P EST LOW 20 MIN: CPT | Performed by: PHYSICIAN ASSISTANT

## 2019-10-18 NOTE — PROGRESS NOTES
Assessment/Plan:    Follow up from laceration injury -  10/7/19  Stitches removed  Subjective:      Patient ID: Shivani Rodriguez is a 30 y o  female  30year old AA female presents to get 6 stitches removed  Cut thumb  while cooking,  10/7/19, went to [facility] ER and laceration was repaired  Wants ears check, feeling much better after taking antibiotics  Review of Systems   Constitutional: Negative for chills, diaphoresis, fatigue and fever  Respiratory: Negative for cough and shortness of breath  Neurological: Negative for headaches  Objective:      /78   Pulse 80   Resp 16   Ht 5' 6" (1 676 m)   Wt 60 3 kg (133 lb)   BMI 21 47 kg/m²          Physical Exam   Constitutional: She is oriented to person, place, and time  She appears well-developed and well-nourished  No distress  HENT:   Head: Normocephalic and atraumatic  Right Ear: External ear normal    Left Ear: External ear normal    Nose: Nose normal    Mouth/Throat: Oropharynx is clear and moist  No oropharyngeal exudate  Pulmonary/Chest: Effort normal and breath sounds normal    Neurological: She is alert and oriented to person, place, and time  Skin: No rash noted  She is not diaphoretic  No erythema  No pallor  Repaired laceration noted left thumb, nos swelling, erythema or discharge noted  FROM of all ext  Noted

## 2019-10-18 NOTE — PATIENT INSTRUCTIONS
Area cleaned with peroxide, and Mupirocin ointment placed on wound, then bandaged  6 stitches removed with no complications, patient tolerated procedure well

## 2019-11-06 ENCOUNTER — OFFICE VISIT (OUTPATIENT)
Dept: FAMILY MEDICINE CLINIC | Facility: HOSPITAL | Age: 29
End: 2019-11-06
Payer: COMMERCIAL

## 2019-11-06 VITALS
HEART RATE: 80 BPM | HEIGHT: 66 IN | SYSTOLIC BLOOD PRESSURE: 110 MMHG | RESPIRATION RATE: 16 BRPM | BODY MASS INDEX: 20.89 KG/M2 | WEIGHT: 130 LBS | DIASTOLIC BLOOD PRESSURE: 68 MMHG

## 2019-11-06 DIAGNOSIS — F41.9 ANXIETY: Primary | ICD-10-CM

## 2019-11-06 PROCEDURE — 99213 OFFICE O/P EST LOW 20 MIN: CPT | Performed by: PHYSICIAN ASSISTANT

## 2019-11-06 RX ORDER — CLONAZEPAM 0.5 MG/1
0.5 TABLET ORAL 2 TIMES DAILY
Qty: 60 TABLET | Refills: 2 | Status: SHIPPED | OUTPATIENT
Start: 2019-11-06 | End: 2020-02-17

## 2019-11-06 RX ORDER — CLONAZEPAM 0.5 MG/1
TABLET ORAL
Refills: 0 | COMMUNITY
Start: 2019-10-04 | End: 2019-11-06 | Stop reason: SDUPTHER

## 2019-11-06 NOTE — PATIENT INSTRUCTIONS
Discussed having good support group, ex  Family or friends  Will add Klonopin 0 5 mg  (temporarily),  bid prn, with the 1 mg  Continue therapy/counseling
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
## 2019-12-27 DIAGNOSIS — F41.9 ANXIETY: ICD-10-CM

## 2019-12-27 RX ORDER — CLONAZEPAM 1 MG/1
1 TABLET ORAL 2 TIMES DAILY PRN
Qty: 60 TABLET | Refills: 3 | Status: SHIPPED | OUTPATIENT
Start: 2019-12-27 | End: 2020-04-27 | Stop reason: SDUPTHER

## 2020-01-22 DIAGNOSIS — R45.1 AGITATION: ICD-10-CM

## 2020-01-22 RX ORDER — CLONIDINE HYDROCHLORIDE 0.2 MG/1
0.2 TABLET ORAL
Qty: 30 TABLET | Refills: 3 | Status: SHIPPED | OUTPATIENT
Start: 2020-01-22 | End: 2020-06-11 | Stop reason: SDUPTHER

## 2020-02-15 DIAGNOSIS — F41.9 ANXIETY: ICD-10-CM

## 2020-02-17 ENCOUNTER — OFFICE VISIT (OUTPATIENT)
Dept: FAMILY MEDICINE CLINIC | Facility: HOSPITAL | Age: 30
End: 2020-02-17
Payer: COMMERCIAL

## 2020-02-17 VITALS
OXYGEN SATURATION: 99 % | DIASTOLIC BLOOD PRESSURE: 70 MMHG | SYSTOLIC BLOOD PRESSURE: 100 MMHG | WEIGHT: 143 LBS | BODY MASS INDEX: 22.98 KG/M2 | HEIGHT: 66 IN | HEART RATE: 68 BPM

## 2020-02-17 DIAGNOSIS — F41.9 ANXIETY: ICD-10-CM

## 2020-02-17 DIAGNOSIS — K29.70 VIRAL GASTRITIS: Primary | ICD-10-CM

## 2020-02-17 PROCEDURE — 99214 OFFICE O/P EST MOD 30 MIN: CPT | Performed by: PHYSICIAN ASSISTANT

## 2020-02-17 PROCEDURE — 3008F BODY MASS INDEX DOCD: CPT | Performed by: PHYSICIAN ASSISTANT

## 2020-02-17 PROCEDURE — 1036F TOBACCO NON-USER: CPT | Performed by: PHYSICIAN ASSISTANT

## 2020-02-17 RX ORDER — CLONAZEPAM 0.5 MG/1
TABLET ORAL
Qty: 60 TABLET | Refills: 3 | Status: SHIPPED | OUTPATIENT
Start: 2020-02-17 | End: 2020-02-17 | Stop reason: SDUPTHER

## 2020-02-17 RX ORDER — CLONAZEPAM 0.5 MG/1
0.5 TABLET ORAL 2 TIMES DAILY PRN
Qty: 60 TABLET | Refills: 3 | Status: SHIPPED | OUTPATIENT
Start: 2020-02-17 | End: 2020-08-21 | Stop reason: ALTCHOICE
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
## 2020-03-25 ENCOUNTER — TELEMEDICINE (OUTPATIENT)
Dept: FAMILY MEDICINE CLINIC | Facility: HOSPITAL | Age: 30
End: 2020-03-25
Payer: COMMERCIAL

## 2020-03-25 ENCOUNTER — TELEPHONE (OUTPATIENT)
Dept: FAMILY MEDICINE CLINIC | Facility: HOSPITAL | Age: 30
End: 2020-03-25

## 2020-03-25 DIAGNOSIS — J32.9 SINUSITIS, UNSPECIFIED CHRONICITY, UNSPECIFIED LOCATION: Primary | ICD-10-CM

## 2020-03-25 PROCEDURE — G2012 BRIEF CHECK IN BY MD/QHP: HCPCS | Performed by: PHYSICIAN ASSISTANT

## 2020-03-25 PROCEDURE — 1036F TOBACCO NON-USER: CPT | Performed by: PHYSICIAN ASSISTANT

## 2020-03-25 RX ORDER — CEFUROXIME AXETIL 250 MG/1
250 TABLET ORAL EVERY 12 HOURS SCHEDULED
Qty: 20 TABLET | Refills: 0 | Status: SHIPPED | OUTPATIENT
Start: 2020-03-25 | End: 2020-04-04

## 2020-03-25 NOTE — PROGRESS NOTES
Virtual Regular Visit    Problem List Items Addressed This Visit     None      Visit Diagnoses     Sinusitis, unspecified chronicity, unspecified location    -  Primary    Relevant Medications    cefuroxime (CEFTIN) 250 mg tablet               Reason for visit is sinus pan and pressure    Encounter provider Nathen Grimes PA-C    Provider located at [place] [address] [place] [address]      Recent Visits  No visits were found meeting these conditions  Showing recent visits within past 7 days and meeting all other requirements     Today's Visits  Date Type Provider Dept   03/25/20 Telephone [facility]   Showing today's visits and meeting all other requirements     Future Appointments  No visits were found meeting these conditions  Showing future appointments within next 150 days and meeting all other requirements        After connecting through [employer], the patient was identified by name and date of birth  Whitney Story was informed that this is a telemedicine visit and that the visit is being conducted through telephone which may not be secure and therefore, might not be HIPAA-compliant  My office door was closed  Other methods to assure confidentiality were taken   No one else was in the room  She acknowledged consent and understanding of privacy and security of the video platform  The patient has agreed to participate and understands they can discontinue the visit at any time  Subjective  Whitney Story is a 30 y o  female with c/o sinus pain and pressure with fever  Also very weak, with worsening headaches/migraines        Past Medical History:   Diagnosis Date    Anxiety        Past Surgical History:   Procedure Laterality Date    NO PAST SURGERIES         Current Outpatient Medications   Medication Sig Dispense Refill    buPROPion (WELLBUTRIN XL) 150 mg 24 hr tablet Take 1 tablet (150 mg total) by mouth daily 30 tablet 0    cefuroxime (CEFTIN) 250 mg tablet Take 1 tablet (250 mg total) by mouth every 12 (twelve) hours for 10 days 20 tablet 0    clonazePAM (KlonoPIN) 0 5 mg tablet Take 1 tablet (0 5 mg total) by mouth 2 (two) times a day as needed for anxiety 60 tablet 3    clonazePAM (KlonoPIN) 1 mg tablet Take 1 tablet (1 mg total) by mouth 2 (two) times a day as needed for anxiety 60 tablet 3    cloNIDine (CATAPRES) 0 2 mg tablet Take 1 tablet (0 2 mg total) by mouth daily at bedtime as needed (for agitation ) One tab  Qhs, prn agitation  30 tablet 3     No current facility-administered medications for this visit  Allergies   Allergen Reactions    Citalopram      Annotation - [id number]: nausea    Codeine Sulfate     Zoloft [Sertraline] Abdominal Pain       Review of Systems   Constitutional: Positive for diaphoresis, fatigue and fever  HENT: Positive for congestion, rhinorrhea, sinus pressure, sinus pain and sore throat  Negative for ear pain  Respiratory: Positive for cough  Negative for chest tightness and shortness of breath  Neurological: Positive for headaches  I spent 10  minutes with the patient during this visit

## 2020-03-25 NOTE — TELEPHONE ENCOUNTER
Spoke to patient, has been congestion for a month, very weak and has pressure in head  Will send Rx  For ceftin to pharmacy

## 2020-04-27 DIAGNOSIS — F41.9 ANXIETY: ICD-10-CM

## 2020-04-27 RX ORDER — CLONAZEPAM 1 MG/1
1 TABLET ORAL 2 TIMES DAILY PRN
Qty: 60 TABLET | Refills: 3 | Status: SHIPPED | OUTPATIENT
Start: 2020-04-27 | End: 2020-08-21 | Stop reason: SDUPTHER

## 2020-05-01 ENCOUNTER — TELEMEDICINE (OUTPATIENT)
Dept: FAMILY MEDICINE CLINIC | Facility: HOSPITAL | Age: 30
End: 2020-05-01
Payer: COMMERCIAL

## 2020-05-01 VITALS — BODY MASS INDEX: 25.18 KG/M2 | TEMPERATURE: 98.1 F | WEIGHT: 156 LBS

## 2020-05-01 DIAGNOSIS — J32.9 SINUSITIS, UNSPECIFIED CHRONICITY, UNSPECIFIED LOCATION: Primary | ICD-10-CM

## 2020-05-01 PROCEDURE — 99213 OFFICE O/P EST LOW 20 MIN: CPT | Performed by: PHYSICIAN ASSISTANT

## 2020-05-01 RX ORDER — CEFUROXIME AXETIL 250 MG/1
250 TABLET ORAL EVERY 12 HOURS SCHEDULED
Qty: 20 TABLET | Refills: 0 | Status: SHIPPED | OUTPATIENT
Start: 2020-05-01 | End: 2020-05-11

## 2020-05-26 ENCOUNTER — TELEPHONE (OUTPATIENT)
Dept: FAMILY MEDICINE CLINIC | Facility: HOSPITAL | Age: 30
End: 2020-05-26

## 2020-05-26 DIAGNOSIS — N30.00 ACUTE CYSTITIS WITHOUT HEMATURIA: Primary | ICD-10-CM

## 2020-05-26 RX ORDER — CIPROFLOXACIN 500 MG/1
500 TABLET, FILM COATED ORAL EVERY 12 HOURS SCHEDULED
Qty: 10 TABLET | Refills: 0 | Status: SHIPPED | OUTPATIENT
Start: 2020-05-26 | End: 2020-05-31

## 2020-06-04 DIAGNOSIS — F41.9 ANXIETY: ICD-10-CM

## 2020-06-05 RX ORDER — CLONAZEPAM 0.5 MG/1
0.5 TABLET ORAL 2 TIMES DAILY PRN
Qty: 60 TABLET | Refills: 3 | Status: CANCELLED | OUTPATIENT
Start: 2020-06-05

## 2020-06-11 ENCOUNTER — TELEPHONE (OUTPATIENT)
Dept: FAMILY MEDICINE CLINIC | Facility: HOSPITAL | Age: 30
End: 2020-06-11

## 2020-06-11 DIAGNOSIS — R45.1 AGITATION: ICD-10-CM

## 2020-06-12 RX ORDER — CLONIDINE HYDROCHLORIDE 0.2 MG/1
0.2 TABLET ORAL
Qty: 30 TABLET | Refills: 3 | Status: SHIPPED | OUTPATIENT
Start: 2020-06-12 | End: 2020-08-21 | Stop reason: SDUPTHER

## 2020-06-22 ENCOUNTER — TELEMEDICINE (OUTPATIENT)
Dept: FAMILY MEDICINE CLINIC | Facility: HOSPITAL | Age: 30
End: 2020-06-22
Payer: COMMERCIAL

## 2020-06-22 VITALS — TEMPERATURE: 98 F | HEIGHT: 66 IN | BODY MASS INDEX: 25.07 KG/M2 | WEIGHT: 156 LBS

## 2020-06-22 DIAGNOSIS — G47.00 INSOMNIA, UNSPECIFIED TYPE: ICD-10-CM

## 2020-06-22 DIAGNOSIS — R51.9 ACUTE NONINTRACTABLE HEADACHE, UNSPECIFIED HEADACHE TYPE: ICD-10-CM

## 2020-06-22 DIAGNOSIS — R53.83 FATIGUE, UNSPECIFIED TYPE: Primary | ICD-10-CM

## 2020-06-22 PROCEDURE — 99214 OFFICE O/P EST MOD 30 MIN: CPT | Performed by: INTERNAL MEDICINE

## 2020-06-22 RX ORDER — NAPROXEN 500 MG/1
500 TABLET ORAL 3 TIMES DAILY PRN
Qty: 30 TABLET | Refills: 1 | Status: SHIPPED | OUTPATIENT
Start: 2020-06-22 | End: 2020-07-02

## 2020-07-20 ENCOUNTER — TELEPHONE (OUTPATIENT)
Dept: FAMILY MEDICINE CLINIC | Facility: HOSPITAL | Age: 30
End: 2020-07-20

## 2020-07-20 DIAGNOSIS — Z20.828 EXPOSURE TO SARS-ASSOCIATED CORONAVIRUS: Primary | ICD-10-CM

## 2020-07-20 NOTE — TELEPHONE ENCOUNTER
Pt states she was exposed to someone who tested positive for covid  She also has a sore throat  Can we put an order in for a covid test?  She would like to quarantine for 14 days - needs a letter for work  Letter can be faxed to [phone]     Pt can be reached at [phone]

## 2020-07-20 NOTE — TELEPHONE ENCOUNTER
Please inform patient order in system, I will type up a work excuse for her to return in 14 days to work

## 2020-08-12 ENCOUNTER — APPOINTMENT (RX ONLY)
Dept: URBAN - METROPOLITAN AREA CLINIC 374 | Facility: CLINIC | Age: 30
Setting detail: DERMATOLOGY
End: 2020-08-12

## 2020-08-12 DIAGNOSIS — L91.0 HYPERTROPHIC SCAR: ICD-10-CM

## 2020-08-12 PROCEDURE — ? SHAVE REMOVAL (NO PATHOLOGY)

## 2020-08-12 PROCEDURE — ? DEFER

## 2020-08-12 PROCEDURE — 11302 SHAVE SKIN LESION 1.1-2.0 CM: CPT

## 2020-08-12 ASSESSMENT — LOCATION ZONE DERM: LOCATION ZONE: TRUNK

## 2020-08-12 ASSESSMENT — LOCATION DETAILED DESCRIPTION DERM: LOCATION DETAILED: UPPER STERNUM

## 2020-08-12 ASSESSMENT — LOCATION SIMPLE DESCRIPTION DERM: LOCATION SIMPLE: CHEST

## 2020-08-12 NOTE — PROCEDURE: SHAVE REMOVAL (NO PATHOLOGY)
Medical Necessity Information: It is in your best interest to select a reason for this procedure from the list below. All of these items fulfill various CMS LCD requirements except the new and changing color options.
Include Z78.9 (Other Specified Conditions Influencing Health Status) As An Associated Diagnosis?: No
Medical Necessity Clause: This procedure was medically necessary because the lesion that was treated was:
Detail Level: Detailed
Size Of Lesion In Cm: 1.3
X Size Of Lesion In Cm (Optional): 0
Anesthesia Type: 1% lidocaine with epinephrine
Hemostasis: Aluminum Chloride and Electrocautery
Wound Care: Vaseline
Path Notes (To The Dermatopathologist): Please check margins.
Consent was obtained from the patient. The risks and benefits to therapy were discussed in detail. Specifically, the risks of infection, scarring, bleeding, prolonged wound healing, incomplete removal, allergy to anesthesia, nerve injury and recurrence were addressed. Prior to the procedure, the treatment site was clearly identified and confirmed by the patient. All components of Universal Protocol/PAUSE Rule completed.
Post-Care Instructions: I reviewed with the patient in detail post-care instructions. Patient is to keep the shave removal site dry overnight, and then apply Vaseline or Aquaphor ointment twice daily until healed.

## 2020-08-19 ENCOUNTER — TELEPHONE (OUTPATIENT)
Dept: FAMILY MEDICINE CLINIC | Facility: HOSPITAL | Age: 30
End: 2020-08-19

## 2020-08-21 ENCOUNTER — OFFICE VISIT (OUTPATIENT)
Dept: FAMILY MEDICINE CLINIC | Facility: HOSPITAL | Age: 30
End: 2020-08-21
Payer: COMMERCIAL

## 2020-08-21 VITALS
DIASTOLIC BLOOD PRESSURE: 58 MMHG | TEMPERATURE: 97.7 F | HEIGHT: 66 IN | WEIGHT: 132.4 LBS | HEART RATE: 80 BPM | SYSTOLIC BLOOD PRESSURE: 90 MMHG | BODY MASS INDEX: 21.28 KG/M2

## 2020-08-21 DIAGNOSIS — F41.9 ANXIETY: ICD-10-CM
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
## 2020-08-21 DIAGNOSIS — R45.1 AGITATION: ICD-10-CM

## 2020-08-21 DIAGNOSIS — F32.A DEPRESSION, UNSPECIFIED DEPRESSION TYPE: ICD-10-CM

## 2020-08-21 PROCEDURE — 3725F SCREEN DEPRESSION PERFORMED: CPT | Performed by: PHYSICIAN ASSISTANT

## 2020-08-21 PROCEDURE — 3008F BODY MASS INDEX DOCD: CPT | Performed by: PHYSICIAN ASSISTANT

## 2020-08-21 PROCEDURE — 1036F TOBACCO NON-USER: CPT | Performed by: PHYSICIAN ASSISTANT

## 2020-08-21 PROCEDURE — 99213 OFFICE O/P EST LOW 20 MIN: CPT | Performed by: PHYSICIAN ASSISTANT

## 2020-08-21 RX ORDER — CLONIDINE HYDROCHLORIDE 0.2 MG/1
0.2 TABLET ORAL 2 TIMES DAILY
Qty: 60 TABLET | Refills: 3 | Status: SHIPPED | OUTPATIENT
Start: 2020-08-21 | End: 2020-12-14 | Stop reason: SDUPTHER

## 2020-08-21 RX ORDER — CLONAZEPAM 1 MG/1
1 TABLET ORAL 3 TIMES DAILY
Qty: 90 TABLET | Refills: 3 | Status: SHIPPED | OUTPATIENT
Start: 2020-08-21 | End: 2020-12-14 | Stop reason: SDUPTHER

## 2020-08-21 RX ORDER — BUPROPION HYDROCHLORIDE 300 MG/1
300 TABLET ORAL DAILY
Qty: 30 TABLET | Refills: 3 | Status: SHIPPED | OUTPATIENT
Start: 2020-08-21

## 2020-08-21 NOTE — PATIENT INSTRUCTIONS
Recommend Motrin for right arm soreness  Increase Wellbutrin to 300 mg  Daily, and Klonopin to tid (3 times daily)  Continue counseling  Call office with any questions or concerns, and return for follow up in 4 weeks
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
## 2020-09-02 ENCOUNTER — APPOINTMENT (RX ONLY)
Dept: URBAN - METROPOLITAN AREA CLINIC 374 | Facility: CLINIC | Age: 30
Setting detail: DERMATOLOGY
End: 2020-09-02

## 2020-09-02 DIAGNOSIS — L91.0 HYPERTROPHIC SCAR: ICD-10-CM | Status: IMPROVED

## 2020-09-02 PROCEDURE — ? INTRALESIONAL KENALOG

## 2020-09-02 PROCEDURE — 11900 INJECT SKIN LESIONS </W 7: CPT

## 2020-09-02 ASSESSMENT — LOCATION SIMPLE DESCRIPTION DERM: LOCATION SIMPLE: CHEST

## 2020-09-02 ASSESSMENT — LOCATION DETAILED DESCRIPTION DERM: LOCATION DETAILED: UPPER STERNUM

## 2020-09-02 ASSESSMENT — LOCATION ZONE DERM: LOCATION ZONE: TRUNK

## 2020-09-02 NOTE — PROCEDURE: INTRALESIONAL KENALOG
Medical Necessity Clause: This procedure was medically necessary because the lesions that were treated were:
Treatment Number (Optional): 2
Include Z78.9 (Other Specified Conditions Influencing Health Status) As An Associated Diagnosis?: No
Concentration Of Solution Injected (Mg/Ml): 5.0
Kenalog Preparation: Kenalog
Total Volume Injected (Ccs- Only Use Numbers And Decimals): 1
X Size Of Lesion In Cm (Optional): 0
Consent: The risks of atrophy were reviewed with the patient.
Administered By (Optional): Kate Smith PA-C
Detail Level: Simple

## 2020-09-11 ENCOUNTER — TELEPHONE (OUTPATIENT)
Dept: FAMILY MEDICINE CLINIC | Facility: HOSPITAL | Age: 30
End: 2020-09-11

## 2020-09-11 DIAGNOSIS — F41.9 ANXIETY: Primary | ICD-10-CM

## 2020-09-11 RX ORDER — CLONAZEPAM 0.5 MG/1
0.5 TABLET ORAL 2 TIMES DAILY PRN
Qty: 30 TABLET | Refills: 3 | Status: SHIPPED | OUTPATIENT
Start: 2020-09-11 | End: 2020-10-08 | Stop reason: SDUPTHER

## 2020-09-22 ENCOUNTER — TELEPHONE (OUTPATIENT)
Dept: FAMILY MEDICINE CLINIC | Facility: HOSPITAL | Age: 30
End: 2020-09-22

## 2020-09-22 DIAGNOSIS — J32.9 SINUSITIS, UNSPECIFIED CHRONICITY, UNSPECIFIED LOCATION: Primary | ICD-10-CM

## 2020-09-22 RX ORDER — CEFUROXIME AXETIL 250 MG/1
250 TABLET ORAL 2 TIMES DAILY WITH MEALS
Qty: 20 TABLET | Refills: 0 | Status: SHIPPED | OUTPATIENT
Start: 2020-09-22 | End: 2020-10-02

## 2020-10-08 ENCOUNTER — TELEPHONE (OUTPATIENT)
Dept: FAMILY MEDICINE CLINIC | Facility: HOSPITAL | Age: 30
End: 2020-10-08

## 2020-10-08 DIAGNOSIS — F41.9 ANXIETY: ICD-10-CM

## 2020-10-08 RX ORDER — CLONAZEPAM 0.5 MG/1
0.5 TABLET ORAL 2 TIMES DAILY PRN
Qty: 30 TABLET | Refills: 3 | Status: SHIPPED | OUTPATIENT
Start: 2020-10-08 | End: 2020-11-10 | Stop reason: SDUPTHER

## 2020-10-22 ENCOUNTER — APPOINTMENT (RX ONLY)
Dept: URBAN - METROPOLITAN AREA CLINIC 374 | Facility: CLINIC | Age: 30
Setting detail: DERMATOLOGY
End: 2020-10-22

## 2020-10-22 DIAGNOSIS — L91.0 HYPERTROPHIC SCAR: ICD-10-CM

## 2020-10-22 PROCEDURE — ? SHAVE REMOVAL (NO PATHOLOGY)

## 2020-10-22 PROCEDURE — ? PRESCRIPTION SAMPLES PROVIDED

## 2020-10-22 PROCEDURE — 11301 SHAVE SKIN LESION 0.6-1.0 CM: CPT

## 2020-10-22 PROCEDURE — ? PHOTO-DOCUMENTATION

## 2020-10-22 ASSESSMENT — LOCATION ZONE DERM: LOCATION ZONE: TRUNK

## 2020-10-22 ASSESSMENT — LOCATION SIMPLE DESCRIPTION DERM: LOCATION SIMPLE: CHEST

## 2020-10-22 ASSESSMENT — LOCATION DETAILED DESCRIPTION DERM: LOCATION DETAILED: UPPER STERNUM

## 2020-10-22 NOTE — PROCEDURE: SHAVE REMOVAL (NO PATHOLOGY)
Medical Necessity Information: It is in your best interest to select a reason for this procedure from the list below. All of these items fulfill various CMS LCD requirements except the new and changing color options.
Include Z78.9 (Other Specified Conditions Influencing Health Status) As An Associated Diagnosis?: No
Medical Necessity Clause: This procedure was medically necessary because the lesion that was treated was:
Detail Level: Detailed
Size Of Lesion In Cm: 0.8
X Size Of Lesion In Cm (Optional): 0
Anesthesia Type: 1% lidocaine with epinephrine
Hemostasis: Aluminum Chloride and Electrocautery
Wound Care: Vaseline
Path Notes (To The Dermatopathologist): Please check margins.
Consent was obtained from the patient. The risks and benefits to therapy were discussed in detail. Specifically, the risks of infection, scarring, bleeding, prolonged wound healing, incomplete removal, allergy to anesthesia, nerve injury and recurrence were addressed. Prior to the procedure, the treatment site was clearly identified and confirmed by the patient. All components of Universal Protocol/PAUSE Rule completed.
Post-Care Instructions: I reviewed with the patient in detail post-care instructions. Patient is to keep the shave removal site dry overnight, and then apply Vaseline or Aquaphor ointment twice daily until healed.

## 2020-11-10 ENCOUNTER — TELEPHONE (OUTPATIENT)
Dept: FAMILY MEDICINE CLINIC | Facility: HOSPITAL | Age: 30
End: 2020-11-10

## 2020-11-10 DIAGNOSIS — F41.9 ANXIETY: ICD-10-CM

## 2020-11-11 RX ORDER — CLONAZEPAM 0.5 MG/1
0.5 TABLET ORAL 2 TIMES DAILY PRN
Qty: 30 TABLET | Refills: 3 | Status: SHIPPED | OUTPATIENT
Start: 2020-11-11 | End: 2020-12-04 | Stop reason: SDUPTHER

## 2020-12-04 DIAGNOSIS — F41.9 ANXIETY: ICD-10-CM

## 2020-12-04 RX ORDER — CLONAZEPAM 0.5 MG/1
0.5 TABLET ORAL 2 TIMES DAILY PRN
Qty: 30 TABLET | Refills: 3 | Status: SHIPPED | OUTPATIENT
Start: 2020-12-04 | End: 2020-12-14 | Stop reason: SDUPTHER

## 2020-12-14 ENCOUNTER — OFFICE VISIT (OUTPATIENT)
Dept: FAMILY MEDICINE CLINIC | Facility: HOSPITAL | Age: 30
End: 2020-12-14
Payer: COMMERCIAL

## 2020-12-14 VITALS
SYSTOLIC BLOOD PRESSURE: 116 MMHG | HEIGHT: 66 IN | BODY MASS INDEX: 22.34 KG/M2 | DIASTOLIC BLOOD PRESSURE: 78 MMHG | WEIGHT: 139 LBS

## 2020-12-14 DIAGNOSIS — R51.9 ACUTE NONINTRACTABLE HEADACHE, UNSPECIFIED HEADACHE TYPE: ICD-10-CM

## 2020-12-14 DIAGNOSIS — F41.9 ANXIETY: ICD-10-CM

## 2020-12-14 DIAGNOSIS — J30.9 ALLERGIC RHINITIS, UNSPECIFIED SEASONALITY, UNSPECIFIED TRIGGER: Primary | ICD-10-CM

## 2020-12-14 DIAGNOSIS — R45.1 AGITATION: ICD-10-CM

## 2020-12-14 PROCEDURE — 99214 OFFICE O/P EST MOD 30 MIN: CPT | Performed by: PHYSICIAN ASSISTANT

## 2020-12-14 PROCEDURE — 3008F BODY MASS INDEX DOCD: CPT | Performed by: PHYSICIAN ASSISTANT

## 2020-12-14 PROCEDURE — 1036F TOBACCO NON-USER: CPT | Performed by: PHYSICIAN ASSISTANT

## 2020-12-14 RX ORDER — CLONAZEPAM 0.5 MG/1
0.5 TABLET ORAL 2 TIMES DAILY PRN
Qty: 30 TABLET | Refills: 3 | Status: SHIPPED | OUTPATIENT
Start: 2020-12-14 | End: 2021-04-05 | Stop reason: SDUPTHER

## 2020-12-14 RX ORDER — CLONAZEPAM 1 MG/1
TABLET ORAL
Qty: 90 TABLET | OUTPATIENT
Start: 2020-12-14

## 2020-12-14 RX ORDER — CLONAZEPAM 1 MG/1
1 TABLET ORAL 3 TIMES DAILY
Qty: 90 TABLET | Refills: 3 | Status: SHIPPED | OUTPATIENT
Start: 2020-12-14 | End: 2021-04-05 | Stop reason: SDUPTHER

## 2020-12-14 RX ORDER — FLUTICASONE PROPIONATE 50 MCG
1 SPRAY, SUSPENSION (ML) NASAL DAILY
Qty: 1 BOTTLE | Refills: 3 | Status: SHIPPED | OUTPATIENT
Start: 2020-12-14

## 2020-12-14 RX ORDER — CLONIDINE HYDROCHLORIDE 0.2 MG/1
0.2 TABLET ORAL 2 TIMES DAILY
Qty: 60 TABLET | Refills: 3 | Status: SHIPPED | OUTPATIENT
Start: 2020-12-14 | End: 2021-06-12

## 2020-12-16 ENCOUNTER — TELEPHONE (OUTPATIENT)
Dept: FAMILY MEDICINE CLINIC | Facility: HOSPITAL | Age: 30
End: 2020-12-16

## 2020-12-17 DIAGNOSIS — J32.9 SINUSITIS, UNSPECIFIED CHRONICITY, UNSPECIFIED LOCATION: Primary | ICD-10-CM

## 2020-12-17 RX ORDER — CEFUROXIME AXETIL 250 MG/1
250 TABLET ORAL 2 TIMES DAILY WITH MEALS
Qty: 20 TABLET | Refills: 0 | Status: SHIPPED | OUTPATIENT
Start: 2020-12-17 | End: 2020-12-27

## 2021-01-18 ENCOUNTER — APPOINTMENT (RX ONLY)
Dept: URBAN - METROPOLITAN AREA CLINIC 374 | Facility: CLINIC | Age: 31
Setting detail: DERMATOLOGY
End: 2021-01-18

## 2021-01-18 DIAGNOSIS — L91.0 HYPERTROPHIC SCAR: ICD-10-CM

## 2021-01-18 PROCEDURE — ? PHOTO-DOCUMENTATION

## 2021-01-18 PROCEDURE — 17110 DESTRUCTION B9 LES UP TO 14: CPT

## 2021-01-18 PROCEDURE — ? LIQUID NITROGEN

## 2021-01-18 ASSESSMENT — LOCATION DETAILED DESCRIPTION DERM: LOCATION DETAILED: UPPER STERNUM

## 2021-01-18 ASSESSMENT — LOCATION SIMPLE DESCRIPTION DERM: LOCATION SIMPLE: CHEST

## 2021-01-18 ASSESSMENT — LOCATION ZONE DERM: LOCATION ZONE: TRUNK

## 2021-01-18 NOTE — PROCEDURE: LIQUID NITROGEN
Add 52 Modifier (Optional): no
Detail Level: Detailed
Duration Of Freeze Thaw-Cycle (Seconds): 10-15
Medical Necessity Information: It is in your best interest to select a reason for this procedure from the list below. All of these items fulfill various CMS LCD requirements except the new and changing color options.
Consent: The patient's consent was obtained including but not limited to risks of crusting, scabbing, blistering, scarring, darker or lighter pigmentary change, recurrence, incomplete removal and infection.
Medical Necessity Clause: This procedure was medically necessary because the lesions that were treated were:
Number Of Freeze-Thaw Cycles: 1 freeze-thaw cycle
Post-Care Instructions: I reviewed with the patient in detail post-care instructions. Patient is to wear sunprotection, and avoid picking at any of the treated lesions. Pt may apply Vaseline to crusted or scabbing areas.

## 2021-01-28 ENCOUNTER — APPOINTMENT (RX ONLY)
Dept: URBAN - METROPOLITAN AREA CLINIC 374 | Facility: CLINIC | Age: 31
Setting detail: DERMATOLOGY
End: 2021-01-28

## 2021-01-28 DIAGNOSIS — L91.0 HYPERTROPHIC SCAR: ICD-10-CM | Status: IMPROVED

## 2021-01-28 PROCEDURE — ? COUNSELING

## 2021-01-28 PROCEDURE — ? PHOTO-DOCUMENTATION

## 2021-01-28 PROCEDURE — ? DEFER

## 2021-01-28 PROCEDURE — 99213 OFFICE O/P EST LOW 20 MIN: CPT | Mod: 24

## 2021-01-28 PROCEDURE — ? TREATMENT REGIMEN

## 2021-01-28 ASSESSMENT — LOCATION ZONE DERM: LOCATION ZONE: TRUNK

## 2021-01-28 ASSESSMENT — LOCATION SIMPLE DESCRIPTION DERM: LOCATION SIMPLE: CHEST

## 2021-01-28 ASSESSMENT — LOCATION DETAILED DESCRIPTION DERM: LOCATION DETAILED: UPPER STERNUM

## 2021-01-28 NOTE — PROCEDURE: DEFER
Detail Level: Zone
Instructions (Optional): 0.9 keloid \\nSRT treatment in [facility]
Introduction Text (Please End With A Colon): The following procedure was deferred:

## 2021-02-17 ENCOUNTER — TELEMEDICINE (OUTPATIENT)
Dept: FAMILY MEDICINE CLINIC | Facility: HOSPITAL | Age: 31
End: 2021-02-17
Payer: COMMERCIAL

## 2021-02-17 DIAGNOSIS — J30.9 ALLERGIC RHINITIS, UNSPECIFIED SEASONALITY, UNSPECIFIED TRIGGER: Primary | ICD-10-CM

## 2021-02-17 PROCEDURE — 99213 OFFICE O/P EST LOW 20 MIN: CPT | Performed by: PHYSICIAN ASSISTANT

## 2021-03-09 ENCOUNTER — APPOINTMENT (RX ONLY)
Dept: URBAN - METROPOLITAN AREA CLINIC 28 | Facility: CLINIC | Age: 31
Setting detail: DERMATOLOGY
End: 2021-03-09

## 2021-03-09 DIAGNOSIS — L91.0 HYPERTROPHIC SCAR: ICD-10-CM

## 2021-03-09 PROCEDURE — ? COUNSELING

## 2021-03-09 PROCEDURE — 99213 OFFICE O/P EST LOW 20 MIN: CPT

## 2021-03-09 PROCEDURE — ? PHOTO-DOCUMENTATION

## 2021-03-09 PROCEDURE — ? DEFER

## 2021-03-09 NOTE — PROCEDURE: DEFER
Detail Level: Detailed
Introduction Text (Please End With A Colon): The following treatment were deferred: SRT Radiation
Procedure To Be Performed At Next Visit: Shave Removal

## 2021-04-05 ENCOUNTER — APPOINTMENT (RX ONLY)
Dept: URBAN - METROPOLITAN AREA CLINIC 28 | Facility: CLINIC | Age: 31
Setting detail: DERMATOLOGY
End: 2021-04-05

## 2021-04-05 ENCOUNTER — TELEPHONE (OUTPATIENT)
Dept: FAMILY MEDICINE CLINIC | Facility: HOSPITAL | Age: 31
End: 2021-04-05

## 2021-04-05 DIAGNOSIS — Z02.9 ENCOUNTER FOR ADMINISTRATIVE EXAMINATIONS, UNSPECIFIED: ICD-10-CM

## 2021-04-05 DIAGNOSIS — F41.9 ANXIETY: ICD-10-CM

## 2021-04-05 NOTE — TELEPHONE ENCOUNTER
Sent refill to pharmacy, please inform patient, I refilled meds  This time, but we usually require 48 hours notice for refills, patient should not wait until last minute

## 2021-04-09 ENCOUNTER — TELEMEDICINE (OUTPATIENT)
Dept: FAMILY MEDICINE CLINIC | Facility: HOSPITAL | Age: 31
End: 2021-04-09
Payer: COMMERCIAL

## 2021-04-09 VITALS — TEMPERATURE: 97.1 F | BODY MASS INDEX: 24.69 KG/M2 | WEIGHT: 153 LBS

## 2021-04-09 DIAGNOSIS — N39.0 URINARY TRACT INFECTION WITHOUT HEMATURIA, SITE UNSPECIFIED: ICD-10-CM

## 2021-04-09 PROCEDURE — 99213 OFFICE O/P EST LOW 20 MIN: CPT | Performed by: PHYSICIAN ASSISTANT

## 2021-04-09 PROCEDURE — 1036F TOBACCO NON-USER: CPT | Performed by: PHYSICIAN ASSISTANT

## 2021-04-09 RX ORDER — CLONAZEPAM 1 MG/1
1 TABLET ORAL 3 TIMES DAILY
Qty: 90 TABLET | Refills: 3 | Status: SHIPPED | OUTPATIENT
Start: 2021-04-09 | End: 2021-08-09 | Stop reason: SDUPTHER

## 2021-04-09 RX ORDER — LEVOFLOXACIN 500 MG/1
500 TABLET, FILM COATED ORAL EVERY 24 HOURS
Qty: 7 TABLET | Refills: 0 | Status: SHIPPED | OUTPATIENT
Start: 2021-04-09 | End: 2021-04-16

## 2021-04-09 RX ORDER — CLONAZEPAM 0.5 MG/1
0.5 TABLET ORAL 2 TIMES DAILY PRN
Qty: 30 TABLET | Refills: 3 | Status: SHIPPED | OUTPATIENT
Start: 2021-04-09 | End: 2021-06-28 | Stop reason: SDUPTHER

## 2021-04-09 NOTE — PROGRESS NOTES
Virtual Regular Visit      Assessment/Plan:    Sinusitis-  Levaquin 500 mg  Qd #7 for 1 week, increase fluid intake  Problem List Items Addressed This Visit     None               Reason for visit is   Chief Complaint   Patient presents with    Virtual Regular Visit    Virtual Regular Visit        Encounter provider Jovanna Cruz PA-C    Provider located at [place] [address] [address]      Recent Visits  Date Type Provider Dept   04/05/21 Telephone [facility]   Showing recent visits within past 7 days and meeting all other requirements     Today's Visits  Date Type Provider Dept   04/09/21 Telemedicine Jovanna Cruz PA-C [facility]   Showing today's visits and meeting all other requirements     Future Appointments  No visits were found meeting these conditions  Showing future appointments within next 150 days and meeting all other requirements        The patient was identified by name and date of birth  Mohan Márquez was informed that this is a telemedicine visit and that the visit is being conducted through [address] [place] [address] and patient was informed that this is not a secure, HIPAA-compliant platform  She agrees to proceed     My office door was closed  No one else was in the room  She acknowledged consent and understanding of privacy and security of the video platform  The patient has agreed to participate and understands they can discontinue the visit at any time  Patient is aware this is a billable service  Subjective  Mohan Márquez is a 35 y o  female   C/o runny nose for about 4 weeks, wakes up with nasal congestion, sinus pain/pressure, and headaches, also for 4 weeks, worse after waking up  Requesting a different antibiotic, one that is stronger  Tried otc headache meds  With no relief  Mucous occasionally discolored            Past Medical History:   Diagnosis Date    Anxiety        Past Surgical History:   Procedure Laterality Date    NO PAST SURGERIES         Current Outpatient Medications   Medication Sig Dispense Refill    buPROPion (WELLBUTRIN XL) 300 mg 24 hr tablet Take 1 tablet (300 mg total) by mouth daily 30 tablet 3    clonazePAM (KlonoPIN) 0 5 mg tablet Take 1 tablet (0 5 mg total) by mouth 2 (two) times a day as needed for anxiety In addition with the 1 mg  30 tablet 3    clonazePAM (KlonoPIN) 1 mg tablet Take 1 tablet (1 mg total) by mouth 3 (three) times a day 90 tablet 3    cloNIDine (CATAPRES) 0 2 mg tablet Take 1 tablet (0 2 mg total) by mouth 2 (two) times a day 60 tablet 3    fluticasone (FLONASE) 50 mcg/act nasal spray 1 spray into each nostril daily (Patient not taking: Reported on 4/9/2021) 1 Bottle 3    magnesium oxide (MAG-OX) 400 mg Take 1 tablet (400 mg total) by mouth 2 (two) times a day for 14 days (Patient not taking: Reported on 12/14/2020) 28 tablet 1    naproxen (NAPROSYN) 500 mg tablet Take 1 tablet (500 mg total) by mouth 3 (three) times a day as needed for mild pain for up to 10 days (Patient not taking: Reported on 12/14/2020) 30 tablet 1     No current facility-administered medications for this visit  Allergies   Allergen Reactions    Citalopram      Annotation - [id number]: nausea    Codeine Sulfate     Zoloft [Sertraline] Abdominal Pain       Review of Systems   Constitutional: Negative for chills, diaphoresis, fatigue and fever  HENT: Positive for congestion, rhinorrhea, sinus pressure and sinus pain  Negative for ear pain and sore throat  Respiratory: Negative for cough, chest tightness and shortness of breath  Gastrointestinal: Positive for abdominal pain and nausea  Negative for vomiting  Neurological: Positive for dizziness, light-headedness and headaches         Video Exam    Vitals:    04/09/21 1036   Temp: (!) 97 1 °F (36 2 °C)   Weight: 69 4 kg (153 lb)       Physical Exam  Constitutional:       General: She is not in acute distress  Appearance: Normal appearance  She is not ill-appearing, toxic-appearing or diaphoretic  Pulmonary:      Effort: Pulmonary effort is normal    Neurological:      General: No focal deficit present  Mental Status: She is alert and oriented to person, place, and time  I spent 20 minutes directly with the patient during this visit      VIRTUAL VISIT DISCLAIMER    Magyomayra Eduardo acknowledges that she has consented to an online visit or consultation  She understands that the online visit is based solely on information provided by her, and that, in the absence of a face-to-face physical evaluation by the physician, the diagnosis she receives is both limited and provisional in terms of accuracy and completeness  This is not intended to replace a full medical face-to-face evaluation by the physician  Magy Eduardo understands and accepts these terms

## 2021-06-12 DIAGNOSIS — R45.1 AGITATION: ICD-10-CM

## 2021-06-12 RX ORDER — CLONIDINE HYDROCHLORIDE 0.2 MG/1
TABLET ORAL
Qty: 60 TABLET | Refills: 3 | Status: SHIPPED | OUTPATIENT
Start: 2021-06-12 | End: 2022-01-06 | Stop reason: SDUPTHER

## 2021-06-14 ENCOUNTER — TELEPHONE (OUTPATIENT)
Dept: FAMILY MEDICINE CLINIC | Facility: HOSPITAL | Age: 31
End: 2021-06-14

## 2021-06-14 NOTE — TELEPHONE ENCOUNTER
Pt needs letter that it is medically necessary for her to take Wellbutrin and Clonidine - she cannot safely drive to work for 4 AM meetings, she has to get up at 2 AM   Please do letter for her re: same  She cant wait for dick on Wednesday needs letter today- she is asking if another dr can address  Possibly dr demarco???     [phone]

## 2021-06-14 NOTE — TELEPHONE ENCOUNTER
Pt sees dick and cant wait until dick comes back this wed  Would you address this? ? 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

## 2021-06-28 DIAGNOSIS — F41.9 ANXIETY: ICD-10-CM

## 2021-06-28 RX ORDER — CLONAZEPAM 0.5 MG/1
0.5 TABLET ORAL 2 TIMES DAILY PRN
Qty: 30 TABLET | Refills: 3 | Status: SHIPPED | OUTPATIENT
Start: 2021-06-28 | End: 2021-11-01 | Stop reason: SDUPTHER

## 2021-07-13 ENCOUNTER — OFFICE VISIT (OUTPATIENT)
Dept: FAMILY MEDICINE CLINIC | Facility: HOSPITAL | Age: 31
End: 2021-07-13
Payer: COMMERCIAL

## 2021-07-13 VITALS
HEART RATE: 77 BPM | OXYGEN SATURATION: 99 % | BODY MASS INDEX: 22.6 KG/M2 | HEIGHT: 66 IN | SYSTOLIC BLOOD PRESSURE: 110 MMHG | DIASTOLIC BLOOD PRESSURE: 72 MMHG | TEMPERATURE: 98.9 F | WEIGHT: 140.6 LBS

## 2021-07-13 DIAGNOSIS — Z80.3 FAMILY HISTORY OF BREAST CANCER IN MOTHER: ICD-10-CM

## 2021-07-13 DIAGNOSIS — J30.9 ALLERGIC RHINITIS, UNSPECIFIED SEASONALITY, UNSPECIFIED TRIGGER: ICD-10-CM

## 2021-07-13 DIAGNOSIS — R51.9 SINUS HEADACHE: ICD-10-CM

## 2021-07-13 DIAGNOSIS — J34.89 SINUS PRESSURE: Primary | ICD-10-CM

## 2021-07-13 DIAGNOSIS — R51.9 ACUTE NONINTRACTABLE HEADACHE, UNSPECIFIED HEADACHE TYPE: ICD-10-CM

## 2021-07-13 PROCEDURE — 99213 OFFICE O/P EST LOW 20 MIN: CPT | Performed by: INTERNAL MEDICINE

## 2021-07-13 RX ORDER — MELOXICAM 15 MG/1
15 TABLET ORAL DAILY
Qty: 30 TABLET | Refills: 1 | Status: SHIPPED | OUTPATIENT
Start: 2021-07-13

## 2021-07-13 RX ORDER — AZELASTINE 1 MG/ML
2 SPRAY, METERED NASAL 2 TIMES DAILY
Qty: 1 ML | Refills: 3 | Status: SHIPPED | OUTPATIENT
Start: 2021-07-13

## 2021-07-13 RX ORDER — METHYLPREDNISOLONE 4 MG/1
TABLET ORAL
Qty: 21 EACH | Refills: 0 | Status: SHIPPED | OUTPATIENT
Start: 2021-07-13

## 2021-07-13 NOTE — PROGRESS NOTES
Assessment/Plan:             Problem List Items Addressed This Visit        Respiratory    Allergic rhinitis due to allergen    Relevant Medications    methylPREDNISolone 4 MG tablet therapy pack    meloxicam (MOBIC) 15 mg tablet       Other    Acute nonintractable headache      Other Visit Diagnoses     Sinus pressure    -  Primary    Relevant Medications    methylPREDNISolone 4 MG tablet therapy pack    azelastine (ASTELIN) 0 1 % nasal spray    meloxicam (MOBIC) 15 mg tablet    Other Relevant Orders    CT sinus wo contrast    Sinus headache        Relevant Medications    azelastine (ASTELIN) 0 1 % nasal spray    meloxicam (MOBIC) 15 mg tablet            Subjective:      Patient ID: Canelo Mckeon is a 35 y o  female    Has severe frontal headache today  Had sinus pressure in February with telemedice visit with dick- had worsening swelling with use of flonase  Then had sinus infection in April - was levaquin 500 daily - has had some  Improvement    now over past week has had increased frontal headache with awakening   mother  when she was 9 of breast cancer -also had lupus        The following portions of the patient's history were reviewed and updated as appropriate: allergies, current medications and problem list      Review of Systems   Constitutional: Negative for fever  HENT: Positive for congestion, sinus pressure, sinus pain and sneezing  Respiratory: Negative for cough and shortness of breath  Cardiovascular: Negative for chest pain  Psychiatric/Behavioral:        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  All other systems reviewed and are negative          Objective:      Current Outpatient Medications:     buPROPion (WELLBUTRIN XL) 300 mg 24 hr tablet, Take 1 tablet (300 mg total) by mouth daily, Disp: 30 tablet, Rfl: 3    clonazePAM (KlonoPIN) 0 5 mg tablet, Take 1 tablet (0 5 mg total) by mouth 2 (two) times a day as needed for anxiety In addition with the 1 mg , Disp: 30 tablet, Rfl: 3    clonazePAM (KlonoPIN) 1 mg tablet, Take 1 tablet (1 mg total) by mouth 3 (three) times a day, Disp: 90 tablet, Rfl: 3    cloNIDine (CATAPRES) 0 2 mg tablet, take 1 tablet by mouth twice a day, Disp: 60 tablet, Rfl: 3    azelastine (ASTELIN) 0 1 % nasal spray, 2 sprays into each nostril 2 (two) times a day Use in each nostril as directed, Disp: 1 mL, Rfl: 3    fluticasone (FLONASE) 50 mcg/act nasal spray, 1 spray into each nostril daily (Patient not taking: Reported on 4/9/2021), Disp: 1 Bottle, Rfl: 3    meloxicam (MOBIC) 15 mg tablet, Take 1 tablet (15 mg total) by mouth daily, Disp: 30 tablet, Rfl: 1    methylPREDNISolone 4 MG tablet therapy pack, Use as directed on package, Disp: 21 each, Rfl: 0    naproxen (NAPROSYN) 500 mg tablet, Take 1 tablet (500 mg total) by mouth 3 (three) times a day as needed for mild pain for up to 10 days (Patient not taking: Reported on 12/14/2020), Disp: 30 tablet, Rfl: 1    Blood pressure 110/72, pulse 77, temperature 98 9 °F (37 2 °C), temperature source Tympanic, height 5' 6" (1 676 m), weight 63 8 kg (140 lb 9 6 oz), SpO2 99 %  Physical Exam  Vitals and nursing note reviewed  Constitutional:       Appearance: Normal appearance  HENT:      Head:      Comments: tenderness over maxillary sinuses and frontal     Right Ear: Tympanic membrane normal  There is no impacted cerumen  Left Ear: Tympanic membrane normal  There is no impacted cerumen  Nose: No congestion  Eyes:      General: No scleral icterus  Right eye: No discharge  Left eye: No discharge  Cardiovascular:      Rate and Rhythm: Normal rate and regular rhythm  Pulses: Normal pulses  Heart sounds: No murmur heard  Pulmonary:      Breath sounds: No wheezing, rhonchi or rales  Abdominal:      General: Abdomen is flat  Palpations: Abdomen is soft  Tenderness: There is no abdominal tenderness     Musculoskeletal:      Cervical back: Normal range of motion  Lymphadenopathy:      Cervical: Cervical adenopathy present  Neurological:      Mental Status: She is alert     Psychiatric:         Mood and Affect: Mood normal          Behavior: Behavior normal

## 2021-07-13 NOTE — PATIENT INSTRUCTIONS
Magfnesium/ clacium / zinc- one daily for headache prevention  Keep sinus spray to every 8 hours only   astelin ( new spray0 2 squirts each notril 2x day  Call toschedule for ct of sinuses   call to schedule ent apptr

## 2021-08-09 ENCOUNTER — TELEPHONE (OUTPATIENT)
Dept: FAMILY MEDICINE CLINIC | Facility: HOSPITAL | Age: 31
End: 2021-08-09

## 2021-08-09 DIAGNOSIS — F41.9 ANXIETY: ICD-10-CM

## 2021-08-10 RX ORDER — CLONAZEPAM 1 MG/1
1 TABLET ORAL 3 TIMES DAILY
Qty: 90 TABLET | Refills: 0 | Status: SHIPPED | OUTPATIENT
Start: 2021-08-10 | End: 2021-09-15

## 2021-08-17 ENCOUNTER — HOSPITAL ENCOUNTER (OUTPATIENT)
Dept: CT IMAGING | Facility: HOSPITAL | Age: 31
Discharge: HOME | End: 2021-08-17
Attending: INTERNAL MEDICINE
Payer: COMMERCIAL

## 2021-08-17 DIAGNOSIS — J34.89 SINUS PRESSURE: ICD-10-CM

## 2021-08-17 PROCEDURE — 70486 CT MAXILLOFACIAL W/O DYE: CPT

## 2021-08-17 PROCEDURE — G1004 CDSM NDSC: HCPCS

## 2021-08-26 ENCOUNTER — APPOINTMENT (RX ONLY)
Dept: URBAN - METROPOLITAN AREA CLINIC 374 | Facility: CLINIC | Age: 31
Setting detail: DERMATOLOGY
End: 2021-08-26

## 2021-08-26 DIAGNOSIS — L91.0 HYPERTROPHIC SCAR: ICD-10-CM | Status: WORSENING

## 2021-08-26 PROCEDURE — ? SHAVE REMOVAL

## 2021-08-26 PROCEDURE — ? ADDITIONAL NOTES

## 2021-08-26 PROCEDURE — 11302 SHAVE SKIN LESION 1.1-2.0 CM: CPT

## 2021-08-26 PROCEDURE — ? DEFER

## 2021-08-26 PROCEDURE — ? PHOTO-DOCUMENTATION

## 2021-08-26 ASSESSMENT — LOCATION ZONE DERM: LOCATION ZONE: TRUNK

## 2021-08-26 ASSESSMENT — LOCATION SIMPLE DESCRIPTION DERM: LOCATION SIMPLE: CHEST

## 2021-08-26 ASSESSMENT — LOCATION DETAILED DESCRIPTION DERM: LOCATION DETAILED: UPPER STERNUM

## 2021-08-26 NOTE — PROCEDURE: SHAVE REMOVAL
Medical Necessity Information: It is in your best interest to select a reason for this procedure from the list below. All of these items fulfill various CMS LCD requirements except the new and changing color options.
Medical Necessity Clause: This procedure was medically necessary because the lesion that was treated was:
Lab: -[id number]
Detail Level: Detailed
Was A Bandage Applied: Yes
Size Of Lesion In Cm (Required): 1.2
X Size Of Lesion In Cm (Optional): 0
Biopsy Method: Dermablade
Anesthesia Type: 1% lidocaine with epinephrine
Hemostasis: Drysol
Wound Care: Petrolatum
Render Path Notes In Note?: No
Consent was obtained from the patient. The risks and benefits to therapy were discussed in detail. Specifically, the risks of infection, scarring, bleeding, prolonged wound healing, incomplete removal, allergy to anesthesia, nerve injury and recurrence were addressed. Prior to the procedure, the treatment site was clearly identified and confirmed by the patient. All components of Universal Protocol/PAUSE Rule completed.
Post-Care Instructions: I reviewed with the patient in detail post-care instructions. Patient is to keep the biopsy site dry overnight, and then apply bacitracin twice daily until healed. Patient may apply hydrogen peroxide soaks to remove any crusting.
Notification Instructions: Patient will be notified of pathology results. However, patient instructed to call the office if not contacted within 2 weeks.
Billing Type: Third-Party Bill

## 2021-08-26 NOTE — PROCEDURE: ADDITIONAL NOTES
Additional Notes: Discussed with patient the likelihood that lesion will regrow after removal and that it may even grow back bigger/worse, patient acknowledged risk and wished to proceed anyway.
Render Risk Assessment In Note?: yes
Detail Level: Zone

## 2021-08-26 NOTE — PROCEDURE: DEFER
Introduction Text (Please End With A Colon): The following procedure was deferred:
Other Procedure: SRT treatment
Detail Level: Zone

## 2021-09-15 ENCOUNTER — OFFICE VISIT (OUTPATIENT)
Dept: FAMILY MEDICINE CLINIC | Facility: HOSPITAL | Age: 31
End: 2021-09-15
Payer: COMMERCIAL

## 2021-09-15 VITALS
HEIGHT: 66 IN | SYSTOLIC BLOOD PRESSURE: 96 MMHG | WEIGHT: 140.8 LBS | DIASTOLIC BLOOD PRESSURE: 70 MMHG | BODY MASS INDEX: 22.63 KG/M2

## 2021-09-15 DIAGNOSIS — J32.9 RECURRENT SINUSITIS: Primary | ICD-10-CM

## 2021-09-15 DIAGNOSIS — F41.9 ANXIETY: ICD-10-CM

## 2021-09-15 PROCEDURE — 99213 OFFICE O/P EST LOW 20 MIN: CPT | Performed by: PHYSICIAN ASSISTANT

## 2021-09-15 PROCEDURE — 3008F BODY MASS INDEX DOCD: CPT | Performed by: PHYSICIAN ASSISTANT

## 2021-09-15 RX ORDER — CLONAZEPAM 1 MG/1
TABLET ORAL
Qty: 90 TABLET | Refills: 3 | Status: SHIPPED | OUTPATIENT
Start: 2021-09-15 | End: 2021-10-25 | Stop reason: SDUPTHER

## 2021-09-15 RX ORDER — CEFUROXIME AXETIL 250 MG/1
250 TABLET ORAL EVERY 12 HOURS SCHEDULED
Qty: 20 TABLET | Refills: 0 | Status: SHIPPED | OUTPATIENT
Start: 2021-09-15 | End: 2021-09-25

## 2021-09-15 NOTE — PROGRESS NOTES
Assessment/Plan:       Problem List Items Addressed This Visit        Respiratory    Recurrent sinusitis - Primary-  Recommend vitamin D 3-2000 IU daily  Push clear fluids, try otc decongestants, and saline nasal sprays  If sx  Persist start Ceftin        Relevant Medications    cefuroxime (CEFTIN) 250 mg tablet- bid for 10 days, on a full stomach  Subjective:      Patient ID: Kami Mathias is a 35 y o  female  C/o fatigue and dizziness since Saturday night  Has been in bed resting due to sx  Review of Systems   Constitutional: Positive for chills and fatigue  Negative for diaphoresis and fever  HENT: Positive for sinus pressure and sinus pain  Negative for congestion, ear pain, rhinorrhea and sore throat  Respiratory: Negative for cough, chest tightness and shortness of breath  Gastrointestinal: Negative for abdominal pain, constipation, diarrhea, nausea and vomiting  Neurological: Positive for light-headedness and headaches  Negative for dizziness  Has facial pressure around right eye  Objective:      BP 96/70   Ht 5' 6" (1 676 m)   Wt 63 9 kg (140 lb 12 8 oz)   BMI 22 73 kg/m²          Physical Exam  Vitals and nursing note reviewed  Constitutional:       General: She is not in acute distress  Appearance: She is not ill-appearing, toxic-appearing or diaphoretic  HENT:      Head: Normocephalic and atraumatic  Right Ear: Ear canal and external ear normal  There is no impacted cerumen  Left Ear: Tympanic membrane, ear canal and external ear normal  There is no impacted cerumen  Ears:      Comments: Right TM hazy and erythematous, with congestion noted in middle ears  Nose: Congestion present  No rhinorrhea  Comments: Turbinates inflamed  Mouth/Throat:      Mouth: Mucous membranes are moist       Pharynx: No oropharyngeal exudate or posterior oropharyngeal erythema  Eyes:      General: No scleral icterus          Right eye: No discharge  Left eye: No discharge  Extraocular Movements: Extraocular movements intact  Conjunctiva/sclera: Conjunctivae normal    Cardiovascular:      Rate and Rhythm: Normal rate and regular rhythm  Pulses: Normal pulses  Heart sounds: Normal heart sounds  Pulmonary:      Effort: Pulmonary effort is normal  No respiratory distress  Breath sounds: Normal breath sounds  No stridor  No rhonchi  Neurological:      General: No focal deficit present  Mental Status: She is alert and oriented to person, place, and time

## 2021-10-25 DIAGNOSIS — F41.9 ANXIETY: ICD-10-CM

## 2021-10-25 RX ORDER — CLONAZEPAM 1 MG/1
1 TABLET ORAL 3 TIMES DAILY
Qty: 90 TABLET | Refills: 3 | Status: SHIPPED | OUTPATIENT
Start: 2021-10-25 | End: 2022-01-06 | Stop reason: SDUPTHER

## 2021-11-01 ENCOUNTER — TELEPHONE (OUTPATIENT)
Dept: FAMILY MEDICINE CLINIC | Facility: HOSPITAL | Age: 31
End: 2021-11-01

## 2021-12-06 ENCOUNTER — APPOINTMENT (RX ONLY)
Dept: URBAN - METROPOLITAN AREA CLINIC 28 | Facility: CLINIC | Age: 31
Setting detail: DERMATOLOGY
End: 2021-12-06

## 2021-12-06 DIAGNOSIS — L91.0 HYPERTROPHIC SCAR: ICD-10-CM

## 2021-12-06 PROCEDURE — ? PHOTO-DOCUMENTATION

## 2021-12-06 PROCEDURE — ? COUNSELING

## 2021-12-06 PROCEDURE — 11302 SHAVE SKIN LESION 1.1-2.0 CM: CPT

## 2021-12-06 PROCEDURE — ? SHAVE REMOVAL

## 2021-12-06 ASSESSMENT — LOCATION ZONE DERM: LOCATION ZONE: TRUNK

## 2021-12-06 ASSESSMENT — LOCATION DETAILED DESCRIPTION DERM: LOCATION DETAILED: UPPER STERNUM

## 2021-12-06 ASSESSMENT — LOCATION SIMPLE DESCRIPTION DERM: LOCATION SIMPLE: CHEST

## 2021-12-06 NOTE — PROCEDURE: SHAVE REMOVAL
Medical Necessity Information: It is in your best interest to select a reason for this procedure from the list below. All of these items fulfill various CMS LCD requirements except the new and changing color options.
Medical Necessity Clause: This procedure was medically necessary because the lesion that was treated was:
Lab: -[id number]
Lab Facility: [id number]
Detail Level: Detailed
Was A Bandage Applied: Yes
Size Of Lesion In Cm (Required): 1.6
Biopsy Method: Dermablade
Anesthesia Type: 1% lidocaine with epinephrine
Hemostasis: Drysol
Wound Care: Petrolatum
Render Path Notes In Note?: No
Consent was obtained from the patient. The risks and benefits to therapy were discussed in detail. Specifically, the risks of infection, scarring, bleeding, prolonged wound healing, incomplete removal, allergy to anesthesia, nerve injury and recurrence were addressed. Prior to the procedure, the treatment site was clearly identified and confirmed by the patient. All components of Universal Protocol/PAUSE Rule completed.
Post-Care Instructions: I reviewed with the patient in detail post-care instructions. Patient is to keep the biopsy site dry overnight, and then apply bacitracin twice daily until healed. Patient may apply hydrogen peroxide soaks to remove any crusting.
Notification Instructions: Patient will be notified of pathology results. However, patient instructed to call the office if not contacted within 2 weeks.
Billing Type: Third-Party Bill

## 2021-12-07 ENCOUNTER — APPOINTMENT (RX ONLY)
Dept: URBAN - METROPOLITAN AREA CLINIC 28 | Facility: CLINIC | Age: 31
Setting detail: DERMATOLOGY
End: 2021-12-07

## 2021-12-07 DIAGNOSIS — L91.0 HYPERTROPHIC SCAR: ICD-10-CM | Status: INADEQUATELY CONTROLLED

## 2021-12-07 PROCEDURE — 77401: CPT

## 2021-12-07 PROCEDURE — 77280 THER RAD SIMULAJ FIELD SMPL: CPT

## 2021-12-07 PROCEDURE — ? SUPERFICIAL RADIATION TREATMENT

## 2021-12-07 ASSESSMENT — LOCATION SIMPLE DESCRIPTION DERM: LOCATION SIMPLE: CHEST

## 2021-12-07 ASSESSMENT — LOCATION DETAILED DESCRIPTION DERM: LOCATION DETAILED: UPPER STERNUM

## 2021-12-07 ASSESSMENT — LOCATION ZONE DERM: LOCATION ZONE: TRUNK

## 2021-12-07 NOTE — PROCEDURE: SUPERFICIAL RADIATION TREATMENT
Bernabe For Simulation Without Treatment Device Design: Yes - (Simple Simulation: 09430) Bernabe For Simulation Without Treatment Device Design: Yes - (Simple Simulation: 97909)

## 2021-12-30 ENCOUNTER — TELEPHONE (OUTPATIENT)
Dept: FAMILY MEDICINE CLINIC | Facility: HOSPITAL | Age: 31
End: 2021-12-30

## 2021-12-30 NOTE — TELEPHONE ENCOUNTER
Calling with complaints of congestion, productive cough, mucous yellow in color, sore throat, body aches, no thermometer to take temp  Has been about 3 or 4 days of symptoms  Was at [facility] urgent care in [facility] on Monday 12/27, had strep swab and covid swab, says both negative, given Amoxicillin  Not vaccinated    Please return call

## 2022-01-05 ENCOUNTER — APPOINTMENT (RX ONLY)
Dept: URBAN - METROPOLITAN AREA CLINIC 374 | Facility: CLINIC | Age: 32
Setting detail: DERMATOLOGY
End: 2022-01-05

## 2022-01-06 DIAGNOSIS — R45.1 AGITATION: ICD-10-CM

## 2022-01-06 DIAGNOSIS — F41.9 ANXIETY: ICD-10-CM

## 2022-01-06 RX ORDER — CLONIDINE HYDROCHLORIDE 0.2 MG/1
0.2 TABLET ORAL 2 TIMES DAILY
Qty: 60 TABLET | Refills: 3 | Status: SHIPPED | OUTPATIENT
Start: 2022-01-06 | End: 2022-07-14 | Stop reason: SDUPTHER

## 2022-01-06 RX ORDER — CLONAZEPAM 1 MG/1
1 TABLET ORAL 3 TIMES DAILY
Qty: 90 TABLET | Refills: 3 | Status: SHIPPED | OUTPATIENT
Start: 2022-01-06 | End: 2022-04-08 | Stop reason: SDUPTHER

## 2022-01-06 RX ORDER — CLONAZEPAM 0.5 MG/1
0.5 TABLET ORAL 2 TIMES DAILY PRN
Qty: 30 TABLET | Refills: 3 | Status: SHIPPED | OUTPATIENT
Start: 2022-01-06 | End: 2022-04-08 | Stop reason: SDUPTHER

## 2022-04-08 DIAGNOSIS — F41.9 ANXIETY: ICD-10-CM

## 2022-04-08 RX ORDER — CLONAZEPAM 0.5 MG/1
0.5 TABLET ORAL 2 TIMES DAILY PRN
Qty: 30 TABLET | Refills: 3 | Status: SHIPPED | OUTPATIENT
Start: 2022-04-08 | End: 2022-07-14 | Stop reason: SDUPTHER

## 2022-04-08 RX ORDER — CLONAZEPAM 1 MG/1
1 TABLET ORAL 3 TIMES DAILY
Qty: 90 TABLET | Refills: 3 | Status: SHIPPED | OUTPATIENT
Start: 2022-04-08 | End: 2022-07-14 | Stop reason: SDUPTHER

## 2022-07-14 DIAGNOSIS — R45.1 AGITATION: ICD-10-CM

## 2022-07-14 DIAGNOSIS — F41.9 ANXIETY: ICD-10-CM

## 2022-07-14 RX ORDER — CLONAZEPAM 0.5 MG/1
0.5 TABLET ORAL 2 TIMES DAILY PRN
Qty: 30 TABLET | Refills: 3 | Status: SHIPPED | OUTPATIENT
Start: 2022-07-14

## 2022-07-14 RX ORDER — CLONIDINE HYDROCHLORIDE 0.2 MG/1
0.2 TABLET ORAL 2 TIMES DAILY
Qty: 60 TABLET | Refills: 3 | Status: SHIPPED | OUTPATIENT
Start: 2022-07-14

## 2022-07-14 RX ORDER — CLONAZEPAM 1 MG/1
1 TABLET ORAL 3 TIMES DAILY
Qty: 90 TABLET | Refills: 3 | Status: SHIPPED | OUTPATIENT
Start: 2022-07-14

## 2022-08-17 ENCOUNTER — OFFICE VISIT (OUTPATIENT)
Dept: FAMILY MEDICINE CLINIC | Facility: HOSPITAL | Age: 32
End: 2022-08-17
Payer: COMMERCIAL

## 2022-08-17 VITALS
BODY MASS INDEX: 23.69 KG/M2 | WEIGHT: 147.4 LBS | SYSTOLIC BLOOD PRESSURE: 102 MMHG | DIASTOLIC BLOOD PRESSURE: 60 MMHG | HEIGHT: 66 IN

## 2022-08-17 DIAGNOSIS — J32.9 RECURRENT SINUSITIS: ICD-10-CM

## 2022-08-17 DIAGNOSIS — J30.9 ALLERGIC RHINITIS, UNSPECIFIED SEASONALITY, UNSPECIFIED TRIGGER: Primary | ICD-10-CM

## 2022-08-17 PROCEDURE — 99213 OFFICE O/P EST LOW 20 MIN: CPT | Performed by: PHYSICIAN ASSISTANT

## 2022-08-17 RX ORDER — MONTELUKAST SODIUM 10 MG/1
10 TABLET ORAL
Qty: 30 TABLET | Refills: 5 | Status: SHIPPED | OUTPATIENT
Start: 2022-08-17

## 2022-08-17 RX ORDER — ONDANSETRON 4 MG/1
TABLET, ORALLY DISINTEGRATING ORAL
COMMUNITY
Start: 2022-08-10

## 2022-08-17 RX ORDER — NITROFURANTOIN 25; 75 MG/1; MG/1
CAPSULE ORAL
COMMUNITY
Start: 2022-08-14

## 2022-08-17 RX ORDER — AZITHROMYCIN 250 MG/1
TABLET, FILM COATED ORAL
COMMUNITY
Start: 2022-06-26 | End: 2022-08-17 | Stop reason: ALTCHOICE

## 2022-08-17 RX ORDER — DEXTROMETHORPHAN HYDROBROMIDE AND PROMETHAZINE HYDROCHLORIDE 15; 6.25 MG/5ML; MG/5ML
SYRUP ORAL
COMMUNITY
Start: 2022-06-09

## 2022-08-17 RX ORDER — DOXYCYCLINE HYCLATE 100 MG/1
100 CAPSULE ORAL EVERY 12 HOURS
COMMUNITY
Start: 2022-06-28

## 2022-08-17 RX ORDER — CEFDINIR 300 MG/1
300 CAPSULE ORAL EVERY 12 HOURS
COMMUNITY
Start: 2022-06-09 | End: 2022-08-17 | Stop reason: ALTCHOICE

## 2022-08-17 NOTE — PROGRESS NOTES
Assessment/Plan:       Problem List Items Addressed This Visit        Respiratory    Recurrent sinusitis    Relevant Medications    montelukast (SINGULAIR) 10 mg tablet    Allergic rhinitis due to allergen - Primary    Relevant Medications    montelukast (SINGULAIR) 10 mg tablet            Subjective:      Patient ID: Flor Chaudhary is a 28 y o  female  Presents for follow up  Doing well, emotionally stable  Allergies very bad, chronic/recurrent sinusitis; has been to several urgent care clinics for infections  Review of Systems   Constitutional: Negative for chills, diaphoresis, fatigue and fever  HENT: Positive for congestion, postnasal drip and rhinorrhea  Negative for ear pain and sore throat  Respiratory: Negative for cough and chest tightness  Psychiatric/Behavioral: Negative for dysphoric mood, self-injury, sleep disturbance and suicidal ideas  The patient is nervous/anxious  Very happy and talkative, very positive and hopeful  Much improved, writing a book, moving ahead in life  Objective:      /60   Ht 5' 6" (1 676 m)   Wt 66 9 kg (147 lb 6 4 oz)   BMI 23 79 kg/m²          Physical Exam  Vitals reviewed  Constitutional:       General: She is not in acute distress  Appearance: Normal appearance  She is not ill-appearing, toxic-appearing or diaphoretic  HENT:      Head: Normocephalic and atraumatic  Right Ear: Tympanic membrane, ear canal and external ear normal  There is no impacted cerumen  Left Ear: Tympanic membrane, ear canal and external ear normal  There is no impacted cerumen  Nose: Congestion present  Mouth/Throat:      Mouth: Mucous membranes are moist       Pharynx: Posterior oropharyngeal erythema present  No oropharyngeal exudate  Eyes:      General: No scleral icterus  Right eye: No discharge  Left eye: No discharge  Extraocular Movements: Extraocular movements intact        Conjunctiva/sclera: Conjunctivae normal    Cardiovascular:      Rate and Rhythm: Normal rate and regular rhythm  Pulses: Normal pulses  Heart sounds: Normal heart sounds  Pulmonary:      Effort: Pulmonary effort is normal       Breath sounds: Normal breath sounds  Musculoskeletal:         General: No swelling, tenderness, deformity or signs of injury  Normal range of motion  Right lower leg: No edema  Left lower leg: No edema  Neurological:      General: No focal deficit present  Mental Status: She is alert and oriented to person, place, and time  Psychiatric:         Mood and Affect: Mood normal          Behavior: Behavior normal          Thought Content:  Thought content normal          Judgment: Judgment normal

## 2022-09-12 LAB — EXT SARS-COV-2: NEGATIVE

## 2022-11-07 ENCOUNTER — OFFICE VISIT (OUTPATIENT)
Dept: FAMILY MEDICINE CLINIC | Facility: HOSPITAL | Age: 32
End: 2022-11-07

## 2022-11-07 VITALS
WEIGHT: 147.4 LBS | HEART RATE: 80 BPM | HEIGHT: 66 IN | SYSTOLIC BLOOD PRESSURE: 106 MMHG | TEMPERATURE: 97.8 F | BODY MASS INDEX: 23.69 KG/M2 | DIASTOLIC BLOOD PRESSURE: 62 MMHG

## 2022-11-07 DIAGNOSIS — Z13.29 SCREENING FOR ENDOCRINE, METABOLIC AND IMMUNITY DISORDER: ICD-10-CM

## 2022-11-07 DIAGNOSIS — Z13.0 SCREENING FOR ENDOCRINE, METABOLIC AND IMMUNITY DISORDER: ICD-10-CM

## 2022-11-07 DIAGNOSIS — Z13.228 SCREENING FOR ENDOCRINE, METABOLIC AND IMMUNITY DISORDER: ICD-10-CM

## 2022-11-07 DIAGNOSIS — F41.1 GAD (GENERALIZED ANXIETY DISORDER): Primary | ICD-10-CM

## 2022-11-07 DIAGNOSIS — Z13.220 SCREENING CHOLESTEROL LEVEL: ICD-10-CM

## 2022-11-07 DIAGNOSIS — F41.0 PANIC DISORDER: ICD-10-CM

## 2022-11-07 DIAGNOSIS — J30.9 ALLERGIC RHINITIS, UNSPECIFIED SEASONALITY, UNSPECIFIED TRIGGER: ICD-10-CM

## 2022-11-07 RX ORDER — BUSPIRONE HYDROCHLORIDE 5 MG/1
5 TABLET ORAL 2 TIMES DAILY
Qty: 60 TABLET | Refills: 1 | Status: SHIPPED | OUTPATIENT
Start: 2022-11-07

## 2022-11-07 RX ORDER — CLONIDINE HYDROCHLORIDE 0.2 MG/1
0.2 TABLET ORAL
Qty: 30 TABLET | Refills: 1 | Status: SHIPPED | OUTPATIENT
Start: 2022-11-07

## 2022-11-07 RX ORDER — CLONAZEPAM 0.5 MG/1
0.5 TABLET ORAL
Qty: 30 TABLET | Refills: 1 | Status: SHIPPED | OUTPATIENT
Start: 2022-11-07

## 2022-11-07 RX ORDER — MONTELUKAST SODIUM 10 MG/1
10 TABLET ORAL
Qty: 90 TABLET | Refills: 1 | Status: SHIPPED | OUTPATIENT
Start: 2022-11-07
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
## 2022-11-07 NOTE — ASSESSMENT & PLAN NOTE
Iraida Stakes discussion with pt regarding benzodiazepine use  I strongly discourage this medication as sole use of anxiety given it is highly addictive, causes withdrawal symptoms and abused  Plan is to gradually reduce dose so that it is only used prn for panic attacks  She is currently on 1 mg/day  Reduce dose to 0 5 mg at [place] [address] for now until we can get better control of her anxiety symptoms  Can continue with clonidine at   She is agreeable to trying Buspar  Plan on 5 mg twice daily  F/U in 4 weeks

## 2022-11-30 LAB
ALBUMIN SERPL-MCNC: 4.6 G/DL (ref 3.8–4.8)
ALBUMIN/GLOB SERPL: 2.1 {RATIO} (ref 1.2–2.2)
ALP SERPL-CCNC: 58 IU/L (ref 44–121)
ALT SERPL-CCNC: 11 IU/L (ref 0–32)
AST SERPL-CCNC: 23 IU/L (ref 0–40)
BASOPHILS # BLD AUTO: 0 X10E3/UL (ref 0–0.2)
BASOPHILS NFR BLD AUTO: 1 %
BILIRUB SERPL-MCNC: 0.3 MG/DL (ref 0–1.2)
BUN SERPL-MCNC: 11 MG/DL (ref 6–20)
BUN/CREAT SERPL: 13 (ref 9–23)
CALCIUM SERPL-MCNC: 9.7 MG/DL (ref 8.7–10.2)
CHLORIDE SERPL-SCNC: 106 MMOL/L (ref 96–106)
CHOLEST SERPL-MCNC: 190 MG/DL (ref 100–199)
CHOLEST/HDLC SERPL: 4 RATIO (ref 0–4.4)
CO2 SERPL-SCNC: 23 MMOL/L (ref 20–29)
CREAT SERPL-MCNC: 0.88 MG/DL (ref 0.57–1)
EGFR: 90 ML/MIN/1.73
EOSINOPHIL # BLD AUTO: 0.1 X10E3/UL (ref 0–0.4)
EOSINOPHIL NFR BLD AUTO: 2 %
ERYTHROCYTE [DISTWIDTH] IN BLOOD BY AUTOMATED COUNT: 13 % (ref 11.7–15.4)
GLOBULIN SER-MCNC: 2.2 G/DL (ref 1.5–4.5)
GLUCOSE SERPL-MCNC: 89 MG/DL (ref 70–99)
HCT VFR BLD AUTO: 37.3 % (ref 34–46.6)
HDLC SERPL-MCNC: 48 MG/DL
HGB BLD-MCNC: 12 G/DL (ref 11.1–15.9)
IMM GRANULOCYTES # BLD: 0 X10E3/UL (ref 0–0.1)
IMM GRANULOCYTES NFR BLD: 0 %
LDLC SERPL CALC-MCNC: 128 MG/DL (ref 0–99)
LYMPHOCYTES # BLD AUTO: 3.2 X10E3/UL (ref 0.7–3.1)
LYMPHOCYTES NFR BLD AUTO: 53 %
MCH RBC QN AUTO: 27.5 PG (ref 26.6–33)
MCHC RBC AUTO-ENTMCNC: 32.2 G/DL (ref 31.5–35.7)
MCV RBC AUTO: 86 FL (ref 79–97)
MONOCYTES # BLD AUTO: 0.4 X10E3/UL (ref 0.1–0.9)
MONOCYTES NFR BLD AUTO: 7 %
NEUTROPHILS # BLD AUTO: 2.2 X10E3/UL (ref 1.4–7)
NEUTROPHILS NFR BLD AUTO: 37 %
PLATELET # BLD AUTO: 291 X10E3/UL (ref 150–450)
POTASSIUM SERPL-SCNC: 4 MMOL/L (ref 3.5–5.2)
PROT SERPL-MCNC: 6.8 G/DL (ref 6–8.5)
RBC # BLD AUTO: 4.36 X10E6/UL (ref 3.77–5.28)
SL AMB VLDL CHOLESTEROL CALC: 14 MG/DL (ref 5–40)
SODIUM SERPL-SCNC: 142 MMOL/L (ref 134–144)
TRIGL SERPL-MCNC: 77 MG/DL (ref 0–149)
TSH SERPL DL<=0.005 MIU/L-ACNC: 1.67 UIU/ML (ref 0.45–4.5)
WBC # BLD AUTO: 6 X10E3/UL (ref 3.4–10.8)

## 2022-12-05 ENCOUNTER — OFFICE VISIT (OUTPATIENT)
Dept: FAMILY MEDICINE CLINIC | Facility: HOSPITAL | Age: 32
End: 2022-12-05

## 2022-12-05 VITALS
HEART RATE: 76 BPM | TEMPERATURE: 97.1 F | BODY MASS INDEX: 22.28 KG/M2 | DIASTOLIC BLOOD PRESSURE: 70 MMHG | SYSTOLIC BLOOD PRESSURE: 112 MMHG | WEIGHT: 138.6 LBS | HEIGHT: 66 IN

## 2022-12-05 DIAGNOSIS — R11.0 NAUSEA: ICD-10-CM

## 2022-12-05 DIAGNOSIS — F41.0 PANIC DISORDER: ICD-10-CM

## 2022-12-05 DIAGNOSIS — R63.0 POOR APPETITE: ICD-10-CM

## 2022-12-05 DIAGNOSIS — R42 LIGHTHEADEDNESS: ICD-10-CM

## 2022-12-05 DIAGNOSIS — F41.1 GAD (GENERALIZED ANXIETY DISORDER): Primary | ICD-10-CM

## 2022-12-05 DIAGNOSIS — F33.1 MODERATE EPISODE OF RECURRENT MAJOR DEPRESSIVE DISORDER (HCC): ICD-10-CM

## 2022-12-05 RX ORDER — ALBUTEROL SULFATE 90 UG/1
AEROSOL, METERED RESPIRATORY (INHALATION)
COMMUNITY
Start: 2022-11-21

## 2022-12-05 NOTE — ASSESSMENT & PLAN NOTE
PHQ-9=21  She adamantly denies feeling depressed  She feels all of her depressive symptoms are r/t not feeling well  I did try to discuss with her that what she is feeling may be depression but she does not agree  Labs were normal  Will try to r/o other causes of her symptoms and see if her mood improves with feeling better

## 2022-12-05 NOTE — PROGRESS NOTES
Assessment/Plan:    NEHEMIAS (generalized anxiety disorder)  NEHEMIAS-7=7  Vague about whether Buspar is effective and feels any anxiety she is having is r/t her current symptoms of not feeling well  Will continue with current regimen  Ultimately would like to continue to wean Klonopin but will hold off for now  F/U in 3 months  Moderate episode of recurrent major depressive disorder ([facility] [address] )  PHQ-9=21  She adamantly denies feeling depressed  She feels all of her depressive symptoms are r/t not feeling well  I did try to discuss with her that what she is feeling may be depression but she does not agree  Labs were normal  Will try to r/o other causes of her symptoms and see if her mood improves with feeling better  Cluster of symptoms may be lingering viral illness exacerbated by not really eating anything  Her recent blood work was normal    Check US abdomen given her poor appetite and fullness  Consider AE from Buspar if symptoms continue  Diagnoses and all orders for this visit:    NEHEMIAS (generalized anxiety disorder)    Panic disorder    Moderate episode of recurrent major depressive disorder (HCC)    Lightheadedness    Nausea  -     US abdomen complete; Future    Poor appetite  -     US abdomen complete; Future    Other orders  -     albuterol (PROVENTIL HFA,VENTOLIN HFA) 90 mcg/act inhaler; inhale 2 puffs by mouth and INTO THE LUNGS every 6 hours if needed for cough - use for 5 days          Subjective:      Patient ID: Bessie Gomez is a 33 y o  female  Not feeling well for the last month  Lightheaded, foggy in the head, fatigued  Poor appetite  Tries to eat but then gets full and can't eat  Losing weight  Denies abdominal pain, N/V  Had diarrhea 2 days ago but this resolved  Daune Short to [facility] with these symptoms  She was told her calcium level was low but everything else was normal  Had HA at that time that went away  Denies fever, chills, urinary symptoms  Nasal congestion just at night   Has seasonal allergies and on Singulair  Occasional cough  PHQ-9=21 but pt denies feeling depressed  States she only feels this way because she is anxious about what might be wrong with her  She is missing work and needs to get back  Unsure if her anxiety is any better taking buspar  She is not sleeping  Continues with Klonopin at night only  The following portions of the patient's history were reviewed and updated as appropriate: allergies, current medications, past family history, past medical history, past social history, past surgical history and problem list     Review of Systems   Constitutional: Positive for appetite change and fatigue  Negative for chills and fever  HENT: Positive for congestion (at night)  Negative for sore throat  Respiratory: Positive for cough (occasional)  Gastrointestinal: Positive for diarrhea  Negative for abdominal pain, nausea and vomiting  Fullness, poor appetite   Genitourinary: Negative for dysuria, frequency and urgency  Neurological: Positive for light-headedness and headaches  Psychiatric/Behavioral: Positive for agitation, decreased concentration and sleep disturbance  Negative for dysphoric mood and suicidal ideas  The patient is nervous/anxious  Objective:  Vitals:    12/05/22 1028   BP: 112/70   Pulse: 76   Temp: (!) 97 1 °F (36 2 °C)      Physical Exam  Vitals reviewed  Constitutional:       Appearance: Normal appearance  She is well-developed and well-nourished  HENT:      Right Ear: Tympanic membrane, ear canal and external ear normal       Left Ear: Tympanic membrane, ear canal and external ear normal       Mouth/Throat:      Mouth: Oropharynx is clear and moist and mucous membranes are normal  Mucous membranes are moist       Pharynx: Oropharynx is clear  Uvula midline  Cardiovascular:      Rate and Rhythm: Normal rate and regular rhythm  Heart sounds: Normal heart sounds  No murmur heard    Pulmonary:      Effort: Pulmonary effort is normal       Breath sounds: Normal breath sounds  Abdominal:      General: Abdomen is flat  Bowel sounds are normal       Palpations: Abdomen is soft  There is no hepatomegaly or splenomegaly  Tenderness: There is no abdominal tenderness  Lymphadenopathy:      Cervical: No cervical adenopathy  Skin:     General: Skin is warm and dry  Neurological:      Mental Status: She is alert and oriented to person, place, and time  Psychiatric:         Mood and Affect: Affect is angry  Speech: Speech is rapid and pressured  Behavior: Behavior is agitated  Thought Content:  Thought content normal          Cognition and Memory: Cognition normal          Judgment: Judgment normal       Comments: No eye contact

## 2022-12-05 NOTE — ASSESSMENT & PLAN NOTE
NEHEMIAS-7=7  Vague about whether Buspar is effective and feels any anxiety she is having is r/t her current symptoms of not feeling well  Will continue with current regimen  Ultimately would like to continue to wean Klonopin but will hold off for now  F/U in 3 months

## 2022-12-20 ENCOUNTER — TELEPHONE (OUTPATIENT)
Dept: FAMILY MEDICINE CLINIC | Facility: HOSPITAL | Age: 32
End: 2022-12-20

## 2022-12-20 NOTE — TELEPHONE ENCOUNTER
Patient reporting she does not like how she feels when taking the buspar - stomach pain, diarrhea  She doesn't feel it is helping with the anxiety  She would like to go back to clonazepam 1mg TID with 0 5mg HS in addition  And stop the buspar      pcb

## 2022-12-21 DIAGNOSIS — F41.0 PANIC DISORDER: ICD-10-CM

## 2022-12-21 RX ORDER — CLONAZEPAM 0.5 MG/1
0.5 TABLET ORAL 2 TIMES DAILY PRN
Qty: 30 TABLET | Refills: 0 | Status: SHIPPED | OUTPATIENT
Start: 2022-12-21

## 2022-12-21 NOTE — TELEPHONE ENCOUNTER
Patient did not want this message to go back to Annie Geronimo as she does not want to see her again  I advised that since she is not established with you, that you will most likely not be prescribing anything further, but still insists I run this by you  Insisting to go back on the Klonopin  Please advise

## 2023-01-03 NOTE — PROGRESS NOTES
Virtual Regular Visit      Assessment/Plan:    Allergic rhinitis- recommend otc allergy medications: ex  Zyrtec  And lubricating drops for eyes  Reason for visit is   Chief Complaint   Patient presents with    Virtual Regular Visit        Encounter provider Anshu Redding PA-C    Provider located at [place] [address] [place] [address]      Recent Visits  No visits were found meeting these conditions  Showing recent visits within past 7 days and meeting all other requirements     Today's Visits  Date Type Provider Dept   02/17/21 Telemedicine Anshu Redding PA-C [facility]   Showing today's visits and meeting all other requirements     Future Appointments  No visits were found meeting these conditions  Showing future appointments within next 150 days and meeting all other requirements        The patient was identified by name and date of birth  Daiana Driver was informed that this is a telemedicine visit and that the visit is being conducted through [address] [place] [address] and patient was informed that this is not a secure, HIPAA-compliant platform  She agrees to proceed     My office door was closed  No one else was in the room  She acknowledged consent and understanding of privacy and security of the video platform  The patient has agreed to participate and understands they can discontinue the visit at any time  Patient is aware this is a billable service  Subjective  Daiana Driver is a 35 y o  female   C/o eye irritation, and watery discharge since past Sunday  Tried some allergy meds  Which helped  Slight discomfort and itching, left eye            Past Medical History:   Diagnosis Date    Anxiety        Past Surgical History:   Procedure Laterality Date    NO PAST SURGERIES         Current Outpatient Medications   Medication Sig Dispense Refill    buPROPion (WELLBUTRIN XL) 300 mg 24 hr tablet Take 1 tablet (300 mg total) by mouth daily 30 tablet 3    clonazePAM (KlonoPIN) 0 5 mg tablet Take 1 tablet (0 5 mg total) by mouth 2 (two) times a day as needed for anxiety In addition with the 1 mg  30 tablet 3    clonazePAM (KlonoPIN) 1 mg tablet Take 1 tablet (1 mg total) by mouth 3 (three) times a day 90 tablet 3    cloNIDine (CATAPRES) 0 2 mg tablet Take 1 tablet (0 2 mg total) by mouth 2 (two) times a day 60 tablet 3    fluticasone (FLONASE) 50 mcg/act nasal spray 1 spray into each nostril daily 1 Bottle 3    magnesium oxide (MAG-OX) 400 mg Take 1 tablet (400 mg total) by mouth 2 (two) times a day for 14 days (Patient not taking: Reported on 12/14/2020) 28 tablet 1    naproxen (NAPROSYN) 500 mg tablet Take 1 tablet (500 mg total) by mouth 3 (three) times a day as needed for mild pain for up to 10 days (Patient not taking: Reported on 12/14/2020) 30 tablet 1     No current facility-administered medications for this visit  Allergies   Allergen Reactions    Citalopram      Annotation - [id number]: nausea    Codeine Sulfate     Zoloft [Sertraline] Abdominal Pain       Review of Systems   Constitutional: Negative for chills, diaphoresis, fatigue and fever  HENT: Negative for congestion, ear pain, rhinorrhea and sore throat  Eyes: Positive for discharge and itching  Negative for pain and visual disturbance  Mostly watery left eye  Respiratory: Negative for cough, chest tightness and shortness of breath  Video Exam    There were no vitals filed for this visit  Physical Exam  Constitutional:       General: She is not in acute distress  Appearance: Normal appearance  She is not ill-appearing, toxic-appearing or diaphoretic  Eyes:      General: No scleral icterus  Right eye: No discharge  Left eye: No discharge  Extraocular Movements: Extraocular movements intact        Conjunctiva/sclera: Conjunctivae normal       Comments: Left eye lids- slightly puffy, but no erythema noted  Pulmonary:      Effort: Pulmonary effort is normal    Neurological:      General: No focal deficit present  Mental Status: She is alert and oriented to person, place, and time  Cranial Nerves: No cranial nerve deficit  Sensory: No sensory deficit  Motor: No weakness  Coordination: Coordination normal    Psychiatric:         Mood and Affect: Mood normal          Behavior: Behavior normal          Thought Content: Thought content normal          Judgment: Judgment normal           I spent 15 minutes directly with the patient during this visit      VIRTUAL VISIT DISCLAIMER    Megan Pichardo acknowledges that she has consented to an online visit or consultation  She understands that the online visit is based solely on information provided by her, and that, in the absence of a face-to-face physical evaluation by the physician, the diagnosis she receives is both limited and provisional in terms of accuracy and completeness  This is not intended to replace a full medical face-to-face evaluation by the physician  Megan Pichardo understands and accepts these terms  Carina RN

## 2023-01-08 DIAGNOSIS — F41.0 PANIC DISORDER: ICD-10-CM

## 2023-01-08 RX ORDER — CLONIDINE HYDROCHLORIDE 0.2 MG/1
TABLET ORAL
Qty: 30 TABLET | Refills: 1 | Status: SHIPPED | OUTPATIENT
Start: 2023-01-08

## 2023-01-11 ENCOUNTER — OFFICE VISIT (OUTPATIENT)
Dept: FAMILY MEDICINE CLINIC | Facility: HOSPITAL | Age: 33
End: 2023-01-11

## 2023-01-11 VITALS
BODY MASS INDEX: 22.34 KG/M2 | DIASTOLIC BLOOD PRESSURE: 86 MMHG | SYSTOLIC BLOOD PRESSURE: 134 MMHG | WEIGHT: 139 LBS | HEART RATE: 68 BPM | HEIGHT: 66 IN

## 2023-01-11 DIAGNOSIS — F33.1 MODERATE EPISODE OF RECURRENT MAJOR DEPRESSIVE DISORDER (HCC): ICD-10-CM

## 2023-01-11 DIAGNOSIS — F41.0 PANIC DISORDER: ICD-10-CM

## 2023-01-11 DIAGNOSIS — F51.01 PRIMARY INSOMNIA: Primary | ICD-10-CM

## 2023-01-11 DIAGNOSIS — F41.1 GAD (GENERALIZED ANXIETY DISORDER): ICD-10-CM

## 2023-01-11 RX ORDER — CLONAZEPAM 1 MG/1
1 TABLET ORAL 2 TIMES DAILY
Qty: 60 TABLET | Refills: 0 | Status: SHIPPED | OUTPATIENT
Start: 2023-01-11 | End: 2023-02-08

## 2023-01-11 NOTE — PROGRESS NOTES
[facility],     Assessment/Plan:      Diagnosis ICD-10-CM Associated Orders   1  Primary insomnia  F51 01       2  Moderate episode of recurrent major depressive disorder (HCC)  F33 1       3  NEHEMIAS (generalized anxiety disorder)  F41 1       4  Panic disorder  F41 0 clonazePAM (KlonoPIN) 1 mg tablet        • C/W clonidine at nighttime  • Resume klonopin 1 mg bid, will adjust as needed  • Patient's medications were reviewed and reconciled  Ordered/Re-ordered as above  Return in about 4 months (around 5/11/2023) for Annual Physical, PAP Smear with Dr Oliva Garcia  • Patient may call or return to office with any questions or concerns  ______________________________________________________________________  Subjective:     Patient ID: Natan Albright is a 33 y o  female  HPI  Natan Albright  Chief Complaint   Patient presents with   • Medication Management     January started working out, trying to exercise, and felt back of ankle hurt, & pop  No bruising  Pain not too bad now  Surgery with [facility] tomorrow  In plantarflexion boot  Recent sinus infxn, then UTI, then yeast infxn  Then also flare of HSV  Had HSV during pregnancy at age 18 yo  Son is 15 yo  Saw Mike Caicedo in Nov "She is currently on 1 mg/day  Reduce dose to 0 5 mg at [facility] for now until we can get better control of her anxiety symptoms  Can continue with clonidine at HS  She is agreeable to trying Buspar  Plan on 5 mg twice daily  "     Buspar made her fatigued, off, not feeling well  Can't sleep  The following portions of the patient's history were reviewed and updated as appropriate: allergies, current medications, past medical history, and problem list     Review of Systems   Constitutional: Negative for chills and fever  Respiratory: Negative for cough and shortness of breath  Cardiovascular: Negative for chest pain           Objective:      Vitals:    01/11/23 1049   BP: 134/86   Pulse: 68      Physical Exam  Vitals reviewed  Constitutional:       General: She is not in acute distress  Appearance: Normal appearance  She is well-developed and normal weight  She is not ill-appearing  HENT:      Head: Normocephalic and atraumatic  Eyes:      General: No scleral icterus  Right eye: No discharge  Left eye: No discharge  Cardiovascular:      Rate and Rhythm: Normal rate and regular rhythm  Pulses: Normal pulses  Heart sounds: Normal heart sounds  No murmur heard  Pulmonary:      Effort: Pulmonary effort is normal  No respiratory distress  Breath sounds: Normal breath sounds  No stridor  No wheezing  Musculoskeletal:      Cervical back: Normal range of motion  No rigidity  Skin:     General: Skin is warm  Neurological:      Mental Status: She is alert and oriented to person, place, and time  Gait: Gait normal    Psychiatric:         Mood and Affect: Mood normal          Behavior: Behavior normal          Thought Content: Thought content normal          Judgment: Judgment normal            Portions of the record may have been created with voice recognition software  Occasional wrong word or "sound alike" substitutions may have occurred due to the inherent limitations of voice recognition software  Please review the chart carefully and recognize, using context, where substitutions/typographical errors may have occurred

## 2023-01-24 ENCOUNTER — TELEPHONE (OUTPATIENT)
Dept: PSYCHIATRY | Facility: CLINIC | Age: 33
End: 2023-01-24

## 2023-01-24 NOTE — TELEPHONE ENCOUNTER
Behavioral Health Outpatient Intake Questions    Referred By   : self    Please advise interviewee that they need to answer all questions truthfully to allow for best care, and any misrepresentations of information may affect their ability to be seen at this clinic   => Was this discussed? Yes     If Minor Child (under age 25)    Who is/are the legal guardian(s) of the child? Is there a custody agreement? No     • If "YES"- Custody orders must be obtained prior to scheduling the first appointment  • In addition, Consent to Treatment must be signed by all legal guardians prior to scheduling the first appointment    • If "NO"- Consent to Treatment must be signed by all legal guardians prior to scheduling the first appointment    Behavioral Health Outpatient Intake History -     Presenting Problem (in patient's own words): Anxiety and depression    Are there any communication barriers for this patient? No                                               If yes, please describe barriers:   • If there is a unique situation, please refer to [address] [place] [address] for final determination  Are you taking any psychiatric medications? Yes   •   If "YES" -What are they Klonopin   •   If "YES" -Who prescribes? Has the Patient previously received outpatient Talk Therapy or Medication Management from [facility]     •    If "YES"- When, Where and with Whom? •    If "NO" -Has Patient received these services elsewhere? •   If "YES" -When, Where, and with Whom? [facility] with Bertha Vega in 2020    Has the Patient abused alcohol or other substances in the last 6 months ? No  No concerns of substance abuse are reported  •  If "YES" -What substance, How much, How often? •  If illegal substance: Refer to [facility] (for VICENTE) or [facility]    •  If Alcohol in excess of 10 drinks per week:  Refer to [facility] (for VICENTE) or [address] [place] [address] History-     Is this treatment court ordered? No   • If "Yes"- refer to [address] [place] [address] for final determination  Has the Patient been convicted of a felony? No  •  If "Yes" -When, What? • Talk Therapy : Send to [address] [place] [address] for final determination   • Med Management: Send to Dr Braxton Cornell for final determination     ACCEPTED as a patient Yes  • If "Yes" Appointment Date: 1/31 @ 8am with Brigida Esqueda    Referred Elsewhere? No  • If “Yes” - (Where? Ex: [facility], [facility], [address] [place] [address], etc )       Name of Insurance Co: [address] [place] [address] ID# [id number]  Insurance Phone #  If ins is primary or secondary? primary  If patient is a minor, parents information such as Name, D  O B of guarantor

## 2023-01-30 ENCOUNTER — TELEPHONE (OUTPATIENT)
Dept: PSYCHIATRY | Facility: CLINIC | Age: 33
End: 2023-01-30

## 2023-01-30 NOTE — TELEPHONE ENCOUNTER
Pt called in regards to not receiving NP paper work  LVM for pt to call intake dept  Code was sent for my chart when initial appt was made

## 2023-01-30 NOTE — TELEPHONE ENCOUNTER
Pt called back regarding ppwk for NP appt  Email was incorrect on file  Resent [employer] registration to correct email and instructed pt to complete NP ppwk and Virtual BH consent via [employer]

## 2023-01-30 NOTE — TELEPHONE ENCOUNTER
LVM for patient to call back in regards to rescheduling initial appointment with Carisa Worrell  Please remind the patient to complete the new patient paperwork through the [facility] [employer] kendra to avoid the rescheduled appointment being canceled

## 2023-01-30 NOTE — TELEPHONE ENCOUNTER
Pt NP appt has been r/s to 2/6 at 8am VIRTUAL  Pt has received [employer] email and confirmed will complete ppwk and consent before appt

## 2023-02-03 ENCOUNTER — TELEPHONE (OUTPATIENT)
Dept: PSYCHIATRY | Facility: CLINIC | Age: 33
End: 2023-02-03

## 2023-02-03 NOTE — TELEPHONE ENCOUNTER
"Good Morning Olayinka Robert- We are looking forward to your appointment this week  There is necessary paperwork that must be read, signed and completed prior to your Virtual Visit with Robby Olivas, Monday 2/6/23, at 8am   We will check back 30-60 minutes prior to your appointment to make sure the paperwork is complete; otherwise, we may have to temporarily cancel this appointment until the paperwork is completed and signed  Please call us at [phone] if you need assistance- we are here to help  You can sign the paperwork by registering for [employer], and completing the forms online digitally, or you can print the attached document, complete, sign and resend it to us through email or fax  You can reply to this email and attach file, or fax it to us at [phone]  If you decide to fax or email, please also send a copy of your ’s license so we can confirm your identity  Again, please call us at [phone] if you have any questions or concerns  If you are having trouble locating your documents in [employer], please see the attached how to [facility] guide  If you need help setting up [employer], instructions on how to register are also attached  You can also call [phone] ([phone]), select option 5, or visit our [address] website online at [facility]/[employer] and they will have a specialist walk you through the steps      "

## 2023-02-06 ENCOUNTER — TELEMEDICINE (OUTPATIENT)
Dept: BEHAVIORAL/MENTAL HEALTH CLINIC | Facility: CLINIC | Age: 33
End: 2023-02-06

## 2023-02-06 DIAGNOSIS — F41.1 GENERALIZED ANXIETY DISORDER: Primary | ICD-10-CM
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
## 2023-02-07 ENCOUNTER — TELEPHONE (OUTPATIENT)
Dept: FAMILY MEDICINE CLINIC | Facility: HOSPITAL | Age: 33
End: 2023-02-07

## 2023-02-07 NOTE — BH TREATMENT PLAN
Outpatient   [address]  1990     Date of Initial Psychotherapy Assessment: 02/06/23  Date of Current Treatment Plan: 02/07/23  Treatment Plan Target Date:   Treatment Plan Expiration Date: ***    Diagnosis:   1   Generalized anxiety disorder            Area(s) of Need: ***    Long Term Goal 1 (in the client's own words): ***    Stage of Change: {SL AMB PSYCH STAGE OF QTSCRO:07930}    Target Date for completion: ***     Anticipated therapeutic modalities: ***     People identified to complete this goal: ***      Objective 1: (identify the means of measuring success in meeting the objective): ***      Objective 2: (identify the means of measuring success in meeting the objective): ***      Long Term Goal 2 (in the client's own words): ***    Stage of Change: {SL AMB PSYCH STAGE OF VHWRWR:77306}    Target Date for completion: ***     Anticipated therapeutic modalities: ***     People identified to complete this goal: ***      Objective 1: (identify the means of measuring success in meeting the objective): ***      Objective 2: (identify the means of measuring success in meeting the objective): ***     Long Term Goal 3 (in the client's own words): ***    Stage of Change: {SL AMB PSYCH STAGE OF AWIJIN:90608}    Target Date for completion: ***     Anticipated therapeutic modalities: ***     People identified to complete this goal: ***      Objective 1: (identify the means of measuring success in meeting the objective): ***      Objective 2: (identify the means of measuring success in meeting the objective): ***     I am currently under the care of a [facility] psychiatric provider: {YES/NO:20200}    My [facility]'s psychiatric provider is: ***    I am currently taking psychiatric medications: {SL AMB TAKING PSYCH MEDS:98318}    I feel that I will be ready for discharge from mental health care when I reach the following (measurable goal/objective): ***    For children and adults who have a legal guardian:   Has there been any change to custody orders and/or guardianship status? {Yes/No/NA:03227}  If yes, attach updated documentation  I have {SL AMB PSYCH CREATED/UPDATED:10641} my Crisis Plan and have been offered a copy of this plan    [address]: Diagnosis and Treatment Plan explained to Neetu Alston {acknowledge/does not acknowledge:48714} an understanding of their diagnosis  Rey Lizeth {SL AMB PSYCH AGREE/DISAGREE:40563} this treatment plan  I have been offered a copy of this Treatment Plan   {YES/NO:20200}

## 2023-02-08 ENCOUNTER — TELEPHONE (OUTPATIENT)
Dept: FAMILY MEDICINE CLINIC | Facility: HOSPITAL | Age: 33
End: 2023-02-08

## 2023-02-08 DIAGNOSIS — F41.0 PANIC DISORDER: ICD-10-CM

## 2023-02-08 DIAGNOSIS — S86.019A RUPTURE OF ACHILLES TENDON, UNSPECIFIED LATERALITY, INITIAL ENCOUNTER: Primary | ICD-10-CM

## 2023-02-08 DIAGNOSIS — F41.0 PANIC DISORDER: Primary | ICD-10-CM

## 2023-02-08 RX ORDER — CLONAZEPAM 0.5 MG/1
0.5 TABLET, ORALLY DISINTEGRATING ORAL 2 TIMES DAILY
Qty: 60 TABLET | Refills: 0 | Status: CANCELLED | OUTPATIENT
Start: 2023-02-08

## 2023-02-08 RX ORDER — CLONAZEPAM 0.5 MG/1
0.5 TABLET, ORALLY DISINTEGRATING ORAL 2 TIMES DAILY
Qty: 60 TABLET | Refills: 2 | OUTPATIENT
Start: 2023-02-08

## 2023-02-08 NOTE — TELEPHONE ENCOUNTER
Patient calling regarding a [employer] message that was sent 2/5     1   Asking if she needs to be seen for a PT order s/p achilles surgery? 2   Needs a refill of her klonopin - would like to go to the 0 5mg tabs per last conversation with Dr Mota Coram

## 2023-02-09 DIAGNOSIS — F41.1 GAD (GENERALIZED ANXIETY DISORDER): ICD-10-CM

## 2023-02-09 DIAGNOSIS — F41.0 PANIC DISORDER: Primary | ICD-10-CM

## 2023-02-09 RX ORDER — CLONAZEPAM 0.5 MG/1
0.5 TABLET ORAL 2 TIMES DAILY PRN
Qty: 60 TABLET | Refills: 2 | Status: SHIPPED | OUTPATIENT
Start: 2023-02-09

## 2023-02-14 ENCOUNTER — SOCIAL WORK (OUTPATIENT)
Dept: BEHAVIORAL/MENTAL HEALTH CLINIC | Facility: CLINIC | Age: 33
End: 2023-02-14

## 2023-02-14 DIAGNOSIS — F32.A DEPRESSION, UNSPECIFIED DEPRESSION TYPE: ICD-10-CM

## 2023-02-14 DIAGNOSIS — F41.1 GENERALIZED ANXIETY DISORDER: Primary | ICD-10-CM
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
## 2023-02-16 ENCOUNTER — EVALUATION (OUTPATIENT)
Dept: PHYSICAL THERAPY | Facility: CLINIC | Age: 33
End: 2023-02-16

## 2023-02-16 DIAGNOSIS — S86.019A RUPTURE OF ACHILLES TENDON, UNSPECIFIED LATERALITY, INITIAL ENCOUNTER: ICD-10-CM

## 2023-02-16 DIAGNOSIS — S86.012D ACHILLES TENDON RUPTURE, LEFT, SUBSEQUENT ENCOUNTER: Primary | ICD-10-CM

## 2023-02-16 NOTE — PROGRESS NOTES
PT Evaluation     Today's date: 2023  Patient name: Susy Waters  : 1990  MRN: [id number]  Referring provider: Kasia May DO  Dx:   Encounter Diagnosis     ICD-10-CM    1  Achilles tendon rupture, left, subsequent encounter  S86 012D       2  Rupture of Achilles tendon, unspecified laterality, initial encounter  S86 019A Ambulatory Referral to Physical Therapy                     Assessment  Assessment details: Susy Waters is a 33 y o  female who presents with increased L ankle/achilles pain consistent with referring diagnosis of Achilles tendon rupture, left, subsequent encounter  (primary encounter diagnosis)  Rupture of Achilles tendon, unspecified laterality, initial encounter that is moderately complex secondary to traumatic onset, moderate fear avoidance and pain levels  Clinically demonstrates decreased L ankle ROM, decreased L ankle strength, decreased L LE proprioception leading to pain with ADLs and exercise  This suggests the need for skilled OPPT to address the above listed impairments, achieve established goals and return to PLOF pain-free  If you have any questions or concerns please contact me at [phone]  Thank you!   Impairments: abnormal coordination, abnormal gait, abnormal muscle firing, abnormal muscle tone, abnormal or restricted ROM, activity intolerance, impaired balance, impaired physical strength, lacks appropriate home exercise program, pain with function and weight-bearing intolerance    Symptom irritability: moderateUnderstanding of Dx/Px/POC: good   Prognosis: good    Goals  Short Term Goals (4 weeks)  1 ) Establish independence with HEP  2 ) Decrease subjective pain levels from NPRS at least to 2-5/10 at rest and with activity  3 ) Improve L ankle ROM at least 5-10 degrees into all planes to allow for improved ease of movement with less guarding    Long Term Goals (8 weeks)  1 ) Improve L ankle ROM to WNL in all planes to restore normal movement with ADLs and function  2 ) Improve L ankle strength to 5/5 in all planes in order to return to pain-free ADLs and function  3 ) Improve FOTO score at least to 75 points showing improved self reported disability     Plan  Plan details: Initiate POC for L ankle ROM, strength; proprioception; monitor sxs and progress as able  Patient would benefit from: skilled physical therapy and PT eval  Planned modality interventions: biofeedback, cryotherapy, electrical stimulation/Russian stimulation and TENS  Planned therapy interventions: abdominal trunk stabilization, activity modification, ADL retraining, ADL training, balance, balance/weight bearing training, behavior modification, breathing training, IADL retraining, joint mobilization, manual therapy, muscle pump exercises, neuromuscular re-education, orthotic management and training, patient education, postural training, self care, sensory integrative techniques, strengthening, stretching, therapeutic activities, therapeutic exercise, therapeutic training, home exercise program, graded motor, graded exercise, graded activity, functional ROM exercises, flexibility, coordination and compression  Frequency: 2x week  Duration in visits: 16  Duration in weeks: 8  Plan of Care beginning date: 2/16/2023  Plan of Care expiration date: 4/13/2023  Treatment plan discussed with: patient        Subjective Evaluation    History of Present Illness  Date of onset: 1/9/2023  Date of surgery: 1/12/2023  Mechanism of injury: surgery  Mechanism of injury: Janell Gay is a 33 y o  female who presents with increased L achilles rupture while performing home exercise  Notes that she felt a sharp pain in the back of her achilles  Notes she was able to walk but had increased swelling  Decided to seek out ER consult at [facility] the following day and scheduled surgery 3 days later and had to stay overnight due to anesthesia  D/C'ed home with crutches    Notes she was able to put [facility] on her foot in a cast a few days post surgery  Had staples removed and put a walking boot  Has been in a CAM boot for 2 weeks with wedges  Had recent F/U with MD and script for OPPT provided  Denies night pain or changes in bowel or bladder function  Reports increased L lateral foot pain and numbness NT signs or sxs- but getting improved sensation in her foot  F/U with MD in 1 month  Wishes to return to PLOF pain-free- get back to exercise  Not a recurrent problem   Quality of life: good    Pain  Current pain ratin  At best pain ratin  At worst pain ratin  Location: L ankle  Quality: dull ache, throbbing and radiating  Relieving factors: support and rest  Progression: improved    Social Support  Steps to enter house: yes  1  Stairs in house: yes   13  Lives in: multiple-level home  Lives with: 15year old  Employment status: not working  Exercise history: 3 days a week   Life stress: High       Diagnostic Tests  MRI studies: abnormal  Treatments  Current treatment: physical therapy  Patient Goals  Patient goals for therapy: decreased edema, decreased pain, improved balance, increased motion, increased strength, independence with ADLs/IADLs and return to sport/leisure activities  Patient goal: Get back to normal and work         Objective     Neurological Testing     Sensation     Ankle/Foot   Left Ankle/Foot   Paresthesia: light touch    Right Ankle/Foot   Intact: light touch     Comments   Left light touch: Lateral foot        Active Range of Motion   Left Ankle/Foot   Dorsiflexion (kf): 0 degrees   Plantar flexion: 35 degrees   Inversion: 15 degrees   Eversion: 10 degrees   Great toe extension: 47 degrees     Right Ankle/Foot   Dorsiflexion (kf): 15 degrees   Plantar flexion: 45 degrees   Inversion: 27 degrees   Eversion: 10 degrees   Great toe extension: 54 degrees     Strength/Myotome Testing     Left Ankle/Foot   Dorsiflexion: 5  Plantar flexion: 3  Inversion: 4  Eversion: 4+    Right Ankle/Foot   Dorsiflexion: 5  Plantar flexion: 5  Inversion: 5  Eversion: 5    Swelling   Left Ankle/Foot   Figure 8: 46 cm    Right Ankle/Foot   Figure 8: 45 cm    Functional Assessment      Squat    Left within functional limits and right within functional limits  Single Leg Stance   Left: 5 seconds  Right: 20 seconds             Precautions: WBAT;   EPOC: 4/12/23  HEP: Access Code: [email or web address]  URL: [email or web address]  Date: 02/16/2023  Prepared by: Bulmaro Scrivener    Exercises  • Long Sitting Calf Stretch with Strap - 1 x daily - 7 x weekly - 3 sets - 10 reps  • Isometric Ankle Eversion with Self Resistance - 1 x daily - 7 x weekly - 3 sets - 10 reps  • Isometric Ankle Inversion - 1 x daily - 7 x weekly - 3 sets - 10 reps  • Seated Heel Raise - 1 x daily - 7 x weekly - 3 sets - 10 reps  • Seated Toe Raise - 1 x daily - 7 x weekly - 3 sets - 10 reps  • Long Sitting Ankle Plantar Flexion with Resistance - 1 x daily - 7 x weekly - 3 sets - 10 reps          Manuals                                                                 Neuro Re-Ed                                                                                                        Ther Ex                                                                                                                     Ther Activity                                       Gait Training                                       Modalities

## 2023-02-23 ENCOUNTER — OFFICE VISIT (OUTPATIENT)
Dept: PHYSICAL THERAPY | Facility: CLINIC | Age: 33
End: 2023-02-23

## 2023-02-23 DIAGNOSIS — S86.012D ACHILLES TENDON RUPTURE, LEFT, SUBSEQUENT ENCOUNTER: ICD-10-CM

## 2023-02-23 DIAGNOSIS — S86.019A RUPTURE OF ACHILLES TENDON, UNSPECIFIED LATERALITY, INITIAL ENCOUNTER: Primary | ICD-10-CM

## 2023-02-23 NOTE — PROGRESS NOTES
Daily Note     Today's date: 2023  Patient name: April Jose  : 1990  MRN: [id number]  Referring provider: Vinod Cornejo DO  Dx:   Encounter Diagnosis     ICD-10-CM    1  Rupture of Achilles tendon, unspecified laterality, initial encounter  S86 019A       2  Achilles tendon rupture, left, subsequent encounter  S86 012D                      Subjective: Pt reports she has been doing well  Consistent with HEP  Objective: See treatment diary below      Assessment: Initiated PT POC today  Pt doing very well overall- has good ankle mobility and pain levels are at a minimum  She shows good weight shifting and balance on involved leg  Tolerated treatment well  Patient demonstrated fatigue post treatment, exhibited good technique with therapeutic exercises and would benefit from continued PT      Plan: Continue per plan of care  Progress treatment as tolerated  Precautions: WBAT;   EPOC: 23  HEP: Access Code: [email or web address]  URL: [email or web address]  Date: 2023  Prepared by: My Cancino    Exercises  • Long Sitting Calf Stretch with Strap - 1 x daily - 7 x weekly - 3 sets - 10 reps  • Isometric Ankle Eversion with Self Resistance - 1 x daily - 7 x weekly - 3 sets - 10 reps  • Isometric Ankle Inversion - 1 x daily - 7 x weekly - 3 sets - 10 reps  • Seated Heel Raise - 1 x daily - 7 x weekly - 3 sets - 10 reps  • Seated Toe Raise - 1 x daily - 7 x weekly - 3 sets - 10 reps  • Long Sitting Ankle Plantar Flexion with Resistance - 1 x daily - 7 x weekly - 3 sets - 10 reps          Manuals            L ankle PROM  WE           Gastroc STM  WE                                     Neuro Re-Ed             Tandem balance  30"x3 ea           SLS  30"x3                                                                            Ther Ex             Weight shifting f-b, s-s  x20 ea           Ankle Isometrics  5"x20 ea           Ankle AROM  x20 ea Ther Activity                                       Gait Training                                       Modalities

## 2023-02-27 ENCOUNTER — SOCIAL WORK (OUTPATIENT)
Dept: BEHAVIORAL/MENTAL HEALTH CLINIC | Facility: CLINIC | Age: 33
End: 2023-02-27

## 2023-02-27 DIAGNOSIS — F41.1 GENERALIZED ANXIETY DISORDER: Primary | ICD-10-CM

## 2023-02-27 NOTE — PSYCH
Behavioral Health Psychotherapy Progress Note    Psychotherapy Provided: Individual Psychotherapy     1  Generalized anxiety disorder            Goals addressed in session: Anxiety    DATA: Client presented for an in-person session  Client reported that her foot has been healing up nicely  She has been attending physical therapy and they have been impressed with her progress  Client expressed that she had returned to work last Tuesday, stating that she could no longer stay at home  She addressed that her work is very flexible and accommodating  She is beginning to go back a few days a week, until she is fully cleared  Client disclosed that her son had a psychiatric evaluation and how his treatment team is developing a plan to help manage his behavior  Client shared that he has been doing better since her last appointment, highlighting that he has been without a phone  She believes this may have contributed to him acting out  Client talked in-length about going to the police station for questioning and how they have no choice but to close out the case  Client talked in-length about this and how this was a waste of time  She mentioned that the officer attempted to try and get her to confess, but client was not falling for it  Client is hoping to put this old job behind her  During this session, this clinician used the following therapeutic modalities: Client-centered Therapy and Cognitive Behavioral Therapy    Substance Abuse was not addressed during this session  If the client is diagnosed with a co-occurring substance use disorder, please indicate any changes in the frequency or amount of use: N/A  Stage of change for addressing substance use diagnoses: No substance use/Not applicable    ASSESSMENT:  Umu Degroot presents with a Euthymic/ normal mood  her affect is Normal range and intensity, which is congruent, with her mood and the content of the session  The client has made progress on their goals      N/A Issa Lira presents with a low risk of suicide, low risk of self-harm, and low risk of harm to others  For any risk assessment that surpasses a "low" rating, a safety plan must be developed  A safety plan was indicated: no  If yes, describe in detail N/A    PLAN: Between sessions, Issa Lira will continue to monitor the progress with her foot and to not over exert herself at work  Will work on focusing on herself and moving forward form her previous job  At the next session, the therapist will use Client-centered Therapy and Cognitive Behavioral Therapy to address anxiety  Behavioral Health Treatment Plan and Discharge Planning: Issa Lira is aware of and agrees to continue to work on their treatment plan  They have identified and are working toward their discharge goals   yes    Visit start and stop times:    02/27/23  Start Time: 1100  Stop Time: 1158  Total Visit Time: 58 minutes

## 2023-03-02 ENCOUNTER — OFFICE VISIT (OUTPATIENT)
Dept: PHYSICAL THERAPY | Facility: CLINIC | Age: 33
End: 2023-03-02

## 2023-03-02 DIAGNOSIS — S86.019A RUPTURE OF ACHILLES TENDON, UNSPECIFIED LATERALITY, INITIAL ENCOUNTER: ICD-10-CM

## 2023-03-02 DIAGNOSIS — S86.012D ACHILLES TENDON RUPTURE, LEFT, SUBSEQUENT ENCOUNTER: Primary | ICD-10-CM

## 2023-03-02 NOTE — PROGRESS NOTES
Daily Note     Today's date: 3/2/2023  Patient name: Lilian Rodriguez  : 1990  MRN: [id number]  Referring provider: Kasie Rodgers DO  Dx:   Encounter Diagnosis     ICD-10-CM    1  Achilles tendon rupture, left, subsequent encounter  S86 012D       2  Rupture of Achilles tendon, unspecified laterality, initial encounter  S86 019A           Start Time: 1100  Stop Time: 1570  Total time in clinic (min): 38 minutes    Subjective: Pt reports less pain, scar is healing      Objective: See treatment diary below      Assessment:  Tolerated treatment well  Decreased wt shift over LLE w/ B wt bearing activities  Patient demonstrated fatigue post treatment, exhibited good technique with therapeutic exercises and would benefit from continued PT      Plan: Continue per plan of care  Progress treatment as tolerated  Precautions: WBAT;   EPOC: 23  HEP: Access Code: [email or web address]  URL: [email or web address]  Date: 2023  Prepared by: Allie Escalera    Exercises  • Long Sitting Calf Stretch with Strap - 1 x daily - 7 x weekly - 3 sets - 10 reps  • Isometric Ankle Eversion with Self Resistance - 1 x daily - 7 x weekly - 3 sets - 10 reps  • Isometric Ankle Inversion - 1 x daily - 7 x weekly - 3 sets - 10 reps  • Seated Heel Raise - 1 x daily - 7 x weekly - 3 sets - 10 reps  • Seated Toe Raise - 1 x daily - 7 x weekly - 3 sets - 10 reps  • Long Sitting Ankle Plantar Flexion with Resistance - 1 x daily - 7 x weekly - 3 sets - 10 reps          Manuals  3/2          L ankle PROM  WE JR          Gastroc STM  WE                                     Neuro Re-Ed             Tandem balance  30"x3 ea 30"x3          SLS  30"x3 30"x3                                                                           Ther Ex             Weight shifting f-b, s-s  x20 ea 20ea          Ankle Isometrics  5"x20 ea 20x5" ea          Ankle AROM  x20 ea 20ea          B HR w/ UE support   insturcted Ther Activity                                       Gait Training                                       Modalities

## 2023-03-09 ENCOUNTER — OFFICE VISIT (OUTPATIENT)
Dept: PHYSICAL THERAPY | Facility: CLINIC | Age: 33
End: 2023-03-09

## 2023-03-09 DIAGNOSIS — S86.019A RUPTURE OF ACHILLES TENDON, UNSPECIFIED LATERALITY, INITIAL ENCOUNTER: ICD-10-CM

## 2023-03-09 DIAGNOSIS — S86.012D ACHILLES TENDON RUPTURE, LEFT, SUBSEQUENT ENCOUNTER: Primary | ICD-10-CM

## 2023-03-09 NOTE — PROGRESS NOTES
Daily Note     Today's date: 3/9/2023  Patient name: Susy Kincaid  : 1990  MRN: [id number]  Referring provider: Polo Donahue DO  Dx:   Encounter Diagnosis     ICD-10-CM    1  Achilles tendon rupture, left, subsequent encounter  S86 012D       2  Rupture of Achilles tendon, unspecified laterality, initial encounter  S86 019A                      Subjective: Notes feeling better, eager to get rid of her limp  Objective: See treatment diary below      Assessment: Continues to have limited DF but able to improve her ability to perform PF with TBand resistance  Trial of BFR for B/L HR and TBand ankle pumps as well as static lunges without sharp pain or difficulty  Will continue with strength and stability as able  Plan: Continue per plan of care  Precautions: WBAT;   EPOC: 23  HEP: Access Code: [email or web address]  URL: [email or web address]  Date: 2023  Prepared by: Nolberto Rodriguez    Exercises  • Long Sitting Calf Stretch with Strap - 1 x daily - 7 x weekly - 3 sets - 10 reps  • Isometric Ankle Eversion with Self Resistance - 1 x daily - 7 x weekly - 3 sets - 10 reps  • Isometric Ankle Inversion - 1 x daily - 7 x weekly - 3 sets - 10 reps  • Seated Heel Raise - 1 x daily - 7 x weekly - 3 sets - 10 reps  • Seated Toe Raise - 1 x daily - 7 x weekly - 3 sets - 10 reps  • Long Sitting Ankle Plantar Flexion with Resistance - 1 x daily - 7 x weekly - 3 sets - 10 reps          Manuals 2/16 2/23 3/2 3         L ankle PROM  WE JR PF         Gastroc STM  WE  PF                                   Neuro Re-Ed             Tandem balance  30"x3 ea 30"x3 30"x3         SLS  30"x3 30"x3 30"x3                                                                          Ther Ex             Weight shifting f-b, s-s  x20 ea 20ea 20x         Ankle Isometrics  5"x20 ea 20x5" ea 20x5"          Ankle AROM  x20 ea 20ea BFR TB GTB 75 reps         B HR w/ UE support   insturcted BFR 75 reps         Static lunge BFR 75 reps                                                Ther Activity             Bike    5 min                       Gait Training                                       Modalities

## 2023-03-13 ENCOUNTER — APPOINTMENT (OUTPATIENT)
Dept: PHYSICAL THERAPY | Facility: CLINIC | Age: 33
End: 2023-03-13

## 2023-03-13 ENCOUNTER — TELEPHONE (OUTPATIENT)
Dept: BEHAVIORAL/MENTAL HEALTH CLINIC | Facility: CLINIC | Age: 33
End: 2023-03-13

## 2023-03-13 NOTE — TELEPHONE ENCOUNTER
Client called needing to cancel and reschedule appointment for today  Client stated that she had a migraine and would not be able to attend  Clinician returned client's call, left message requesting a callback to reschedule appointment

## 2023-03-14 ENCOUNTER — TELEPHONE (OUTPATIENT)
Dept: BEHAVIORAL/MENTAL HEALTH CLINIC | Facility: CLINIC | Age: 33
End: 2023-03-14

## 2023-03-21 ENCOUNTER — OFFICE VISIT (OUTPATIENT)
Dept: FAMILY MEDICINE CLINIC | Facility: HOSPITAL | Age: 33
End: 2023-03-21

## 2023-03-21 VITALS
WEIGHT: 140 LBS | HEIGHT: 66 IN | SYSTOLIC BLOOD PRESSURE: 110 MMHG | BODY MASS INDEX: 22.5 KG/M2 | HEART RATE: 68 BPM | DIASTOLIC BLOOD PRESSURE: 68 MMHG

## 2023-03-21 DIAGNOSIS — Z80.3 FAMILY HISTORY OF BREAST CANCER IN MOTHER: ICD-10-CM

## 2023-03-21 DIAGNOSIS — F51.01 PRIMARY INSOMNIA: Primary | ICD-10-CM

## 2023-03-21 DIAGNOSIS — R42 LIGHTHEADEDNESS: ICD-10-CM

## 2023-03-21 DIAGNOSIS — F33.1 MODERATE EPISODE OF RECURRENT MAJOR DEPRESSIVE DISORDER (HCC): ICD-10-CM

## 2023-03-21 DIAGNOSIS — F41.1 GAD (GENERALIZED ANXIETY DISORDER): ICD-10-CM

## 2023-03-21 DIAGNOSIS — F41.0 PANIC DISORDER: ICD-10-CM

## 2023-03-21 RX ORDER — CLONAZEPAM 1 MG/1
TABLET ORAL
Qty: 50 TABLET | Refills: 2 | Status: SHIPPED | OUTPATIENT
Start: 2023-03-31

## 2023-03-21 NOTE — PROGRESS NOTES
[facility],     Assessment/Plan:      Diagnosis ICD-10-CM Associated Orders   1  Primary insomnia  F51 01       2  Panic disorder  F41 0 clonazePAM (KlonoPIN) 1 mg tablet      3  NEHEMIAS (generalized anxiety disorder)  F41 1 clonazePAM (KlonoPIN) 1 mg tablet      4  Lightheadedness  R42       5  Moderate episode of recurrent major depressive disorder (HCC)  F33 1       6  Family history of breast cancer in mother  Z80 2 Mammo screening bilateral w 3d & cad     Ambulatory Referral to Genetics        • Adjusted klonopin dose, will  new script earlier than one month given new dosing  0 5 mg now to 1 mg tabs  See order  • Likely withdrawal reviewed timeline  • Mother had BR CA and  at 38 yo from it  Needs to do mammo & pap  • Orders in & ref genetics  Return if symptoms worsen or fail to improve  Annual in May  • Patient may call or return to office with any questions or concerns  ______________________________________________________________________  Subjective:     Patient ID: Yunior Horowitz is a 33 y o  female  HPI  Yunior Horowitz  Chief Complaint   Patient presents with   • Medication Management     Off klonopin for 3 days   Dizziness lightheaded felt withdrawal sx from klonopin   [facility]ent to [facility] evaluation      LH, DZ, too weak to stand on her legs  Fatigued when this happens  Doesn't want to depend on that medicine  Missed work, body felt awful  Anxiety higher last month with going through stuff with her son  Given meclizine & mobic from   Refilled klonopin on 3/12 & immediately felt better  Does not want to try SSRI  The following portions of the patient's history were reviewed and updated as appropriate: allergies, current medications, past medical history, and problem list     Review of Systems   Constitutional: Negative for chills and fever  Respiratory: Negative for cough and shortness of breath      Cardiovascular: Negative for chest pain and palpitations  Neurological: Positive for dizziness, light-headedness and headaches  Psychiatric/Behavioral: Positive for dysphoric mood  Negative for self-injury, sleep disturbance and suicidal ideas  The patient is nervous/anxious  Objective:      Vitals:    03/21/23 0938   BP: 110/68   Pulse: 68      Physical Exam  Vitals reviewed  Constitutional:       General: She is not in acute distress  Appearance: Normal appearance  She is well-developed and normal weight  She is not ill-appearing  HENT:      Head: Normocephalic and atraumatic  Eyes:      General: No scleral icterus  Right eye: No discharge  Left eye: No discharge  Cardiovascular:      Rate and Rhythm: Normal rate and regular rhythm  Pulses: Normal pulses  Heart sounds: Normal heart sounds  No murmur heard  Pulmonary:      Effort: Pulmonary effort is normal  No respiratory distress  Breath sounds: Normal breath sounds  No stridor  No wheezing  Musculoskeletal:      Cervical back: Normal range of motion  No rigidity  Right lower leg: No edema  Left lower leg: No edema  Skin:     General: Skin is warm  Neurological:      Mental Status: She is alert and oriented to person, place, and time  Gait: Gait normal    Psychiatric:         Mood and Affect: Mood normal          Behavior: Behavior normal          Thought Content: Thought content normal          Judgment: Judgment normal            Portions of the record may have been created with voice recognition software  Occasional wrong word or "sound alike" substitutions may have occurred due to the inherent limitations of voice recognition software  Please review the chart carefully and recognize, using context, where substitutions/typographical errors may have occurred

## 2023-03-23 ENCOUNTER — OFFICE VISIT (OUTPATIENT)
Dept: PHYSICAL THERAPY | Facility: CLINIC | Age: 33
End: 2023-03-23

## 2023-03-23 ENCOUNTER — SOCIAL WORK (OUTPATIENT)
Dept: BEHAVIORAL/MENTAL HEALTH CLINIC | Facility: CLINIC | Age: 33
End: 2023-03-23

## 2023-03-23 DIAGNOSIS — S86.019A RUPTURE OF ACHILLES TENDON, UNSPECIFIED LATERALITY, INITIAL ENCOUNTER: ICD-10-CM

## 2023-03-23 DIAGNOSIS — S86.012D ACHILLES TENDON RUPTURE, LEFT, SUBSEQUENT ENCOUNTER: Primary | ICD-10-CM

## 2023-03-23 DIAGNOSIS — F41.1 GENERALIZED ANXIETY DISORDER: Primary | ICD-10-CM

## 2023-03-23 NOTE — PROGRESS NOTES
Daily Note     Today's date: 3/23/2023  Patient name: Flor Chaudhary  : 1990  MRN: [id number]  Referring provider: Neal Gerardo DO  Dx:   Encounter Diagnosis     ICD-10-CM    1  Achilles tendon rupture, left, subsequent encounter  S86 012D       2  Rupture of Achilles tendon, unspecified laterality, initial encounter  S86 019A                      Subjective: Pt reports MD has allowed her to d/c the boot at this time  She also was able walk up and down steps reciprocal       Objective: See treatment diary below      Assessment: Continues to make improvements in her DF ROM and strength  She was able to fully WB on L Leg and perform a signle leg HR with UE assistance which she was unable to do last week  Also able to progress to a leg press HR against resistance  Continued to work on strengthening with BFR with good tolerance  Post session she was ambulating around clinic with no evidence of a limp, just a bit smaller stride than her norm  Plan: Continue per plan of care  Precautions: WBAT;   EPOC: 23  HEP: Access Code: [email or web address]  URL: [email or web address]  Date: 2023  Prepared by: Denise Velez    Exercises  • Long Sitting Calf Stretch with Strap - 1 x daily - 7 x weekly - 3 sets - 10 reps  • Isometric Ankle Eversion with Self Resistance - 1 x daily - 7 x weekly - 3 sets - 10 reps  • Isometric Ankle Inversion - 1 x daily - 7 x weekly - 3 sets - 10 reps  • Seated Heel Raise - 1 x daily - 7 x weekly - 3 sets - 10 reps  • Seated Toe Raise - 1 x daily - 7 x weekly - 3 sets - 10 reps  • Long Sitting Ankle Plantar Flexion with Resistance - 1 x daily - 7 x weekly - 3 sets - 10 reps          Manuals 2/16 2/23 3/2 3/9 3/23        L ankle PROM  WE JR PF WE        Gastroc STM  WE  PF WE                                  Neuro Re-Ed             Tandem balance  30"x3 ea 30"x3 30"x3 30"x3        SLS  30"x3 30"x3 30"x3 30"x3 Ther Ex             Weight shifting f-b, s-s  x20 ea 20ea 20x         Ankle Isometrics  5"x20 ea 20x5" ea 20x5"          Ankle AROM  x20 ea 20ea BFR TB GTB 75 reps BFR TB GTB 75 reps        B HR w/ UE support   insturcted BFR 75 reps BFR 75 reps        Static lunge    BFR 75 reps BFR 75 reps        SL HR with UE assistance     x20        Leg Press HR with Wedge     BFR  25#  75 reps                     Ther Activity             Bike    5 min  5 min                     Gait Training                                       Modalities

## 2023-03-23 NOTE — PSYCH
Behavioral Health Psychotherapy Progress Note    Psychotherapy Provided: Individual Psychotherapy     1  Generalized anxiety disorder            Goals addressed in session: Anxiety    DATA: Client presented for an in-person session  Client reported that what she thought was a migraine, ended up being withdraw from one of the medications she was taking  She mentioned having to reschedule her last appointment due to experiencing a migraine, however, the symptoms were intense and left her bedridden  Client ended up going to the doctors, stating that the feelings she was encountering were too intense to be a migraine  It turned out that the medication she had stopped taking for a few days prior had caused her body to go through withdrawal  Client will be revisiting what other medication she can take at her next appointment  Client shared that her son had snuck out of the house by going through one of the windows in the home  She mentioned that she has a ring doorbell, so he knew not to sneak out through the front door  Client voiced that she keeps her home very clean and organized and was able to tell the window he snuck out of was tampered with  Client expressed that she was given a new  at [address] [place] [address] who is working closely with her son and focusing on his behavioral issues  Client noted that the  had given her son a month to "improve his behavior" before exploring the possibility of juvenile probation, which can lead to a six month program  Client disclosed that they are also going to explore a possible ADHD diagnosis  Client talked in-length about her family and how toxic certain members can be  She voiced keeping her distance from her sister, Karon Halsted, emphasizing that there is tension between them  Client addressed only having her grandmother and a little bit of her father for support  Client highlighted that she is back to work and not having any issues with her foot   She was excited to share that she can finally wear heels without her foot bothering her  During this session, this clinician used the following therapeutic modalities: Client-centered Therapy and Cognitive Behavioral Therapy    Substance Abuse was not addressed during this session  If the client is diagnosed with a co-occurring substance use disorder, please indicate any changes in the frequency or amount of use: N/A  Stage of change for addressing substance use diagnoses: No substance use/Not applicable    ASSESSMENT:  Rigo Lopez presents with a Euthymic/ normal mood  her affect is Normal range and intensity, which is congruent, with her mood and the content of the session  The client has made progress on their goals  Rigo Lopez presents with a low risk of suicide, low risk of self-harm, and low risk of harm to others  For any risk assessment that surpasses a "low" rating, a safety plan must be developed  A safety plan was indicated: no  If yes, describe in detail N/A    PLAN: Between sessions, Rigo Lopez will continue to utilize coping skills to manage stress/anxiety  At the next session, the therapist will use Client-centered Therapy and Cognitive Behavioral Therapy to address anxiety  Behavioral Health Treatment Plan and Discharge Planning: Rigo Lopez is aware of and agrees to continue to work on their treatment plan  They have identified and are working toward their discharge goals   yes    Visit start and stop times:    03/23/23  Start Time: 1002  Stop Time: 1056  Total Visit Time: 54 minutes

## 2023-03-24 ENCOUNTER — TELEPHONE (OUTPATIENT)
Dept: GENETICS | Facility: CLINIC | Age: 33
End: 2023-03-24

## 2023-03-24 NOTE — TELEPHONE ENCOUNTER
I called Abhinav Mosher to schedule a new patient appointment with the Cancer Risk and Genetics Program       Outcome:  Genetics appointment scheduled for 8/9    Personal/Family History Related to Appointment:  No phx of cancer  Fhx of breast cancer (MOM, MGM, PGM)  Non-Voodoo      History of Genetic Testing:  Patient reports no personal or family history of genetic testing    Genetics Family History Questionnaire:  I confirmed the patient's e-mail on file as the best e-mail to send an invite link for our genetics family history intake

## 2023-03-27 DIAGNOSIS — J30.9 ALLERGIC RHINITIS, UNSPECIFIED SEASONALITY, UNSPECIFIED TRIGGER: ICD-10-CM

## 2023-03-27 DIAGNOSIS — F41.0 PANIC DISORDER: ICD-10-CM

## 2023-03-28 RX ORDER — MONTELUKAST SODIUM 10 MG/1
10 TABLET ORAL
Qty: 90 TABLET | Refills: 0 | Status: SHIPPED | OUTPATIENT
Start: 2023-03-28

## 2023-03-28 RX ORDER — CLONIDINE HYDROCHLORIDE 0.2 MG/1
0.2 TABLET ORAL
Qty: 30 TABLET | Refills: 0 | Status: SHIPPED | OUTPATIENT
Start: 2023-03-28

## 2023-03-30 ENCOUNTER — APPOINTMENT (OUTPATIENT)
Dept: PHYSICAL THERAPY | Facility: CLINIC | Age: 33
End: 2023-03-30

## 2023-04-26 ENCOUNTER — TELEPHONE (OUTPATIENT)
Dept: FAMILY MEDICINE CLINIC | Facility: HOSPITAL | Age: 33
End: 2023-04-26

## 2023-04-26 DIAGNOSIS — S86.019A RUPTURE OF ACHILLES TENDON, UNSPECIFIED LATERALITY, INITIAL ENCOUNTER: Primary | ICD-10-CM

## 2023-05-02 ENCOUNTER — TELEMEDICINE (OUTPATIENT)
Dept: BEHAVIORAL/MENTAL HEALTH CLINIC | Facility: CLINIC | Age: 33
End: 2023-05-02

## 2023-05-02 DIAGNOSIS — F41.1 GENERALIZED ANXIETY DISORDER: Primary | ICD-10-CM

## 2023-05-02 NOTE — PSYCH
Virtual Regular Visit    Verification of patient location:    Patient is located at Home in the following state in which I hold an active license Other; [place] working [place] licensure       Assessment/Plan:    Problem List Items Addressed This Visit    None  Visit Diagnoses     Generalized anxiety disorder    -  Primary          Goals addressed in session: Goal 1          Reason for visit is   Chief Complaint   Patient presents with    Virtual Regular Visit        Encounter provider Mandi Marquez    Provider located at [address] [place] [address] [address]  [address] [place] [address]  [phone]      Recent Visits  Date Type Provider Dept   04/26/23 Telephone Mandi Kohler DO [facility] [address]   Showing recent visits within past 7 days and meeting all other requirements  Today's Visits  Date Type Provider Dept   05/02/23 Telemedicine [facility]   Showing today's visits and meeting all other requirements  Future Appointments  No visits were found meeting these conditions  Showing future appointments within next 150 days and meeting all other requirements       The patient was identified by name and date of birth  Joy Nguyen was informed that this is a telemedicine visit and [facility]  She agrees to proceed     My office door was closed  No one else was in the room  She acknowledged consent and understanding of privacy and security of the video platform  The patient has agreed to participate and understands they can discontinue the visit at any time  Patient is aware this is a billable service  Subjective  Joy Nguyen is a 33 y o  female         HPI     Past Medical History:   Diagnosis Date    Anxiety     Depression 10/5/2015       Past Surgical History:   Procedure Laterality Date    NO PAST SURGERIES Current Outpatient Medications   Medication Sig Dispense Refill    albuterol (PROVENTIL HFA,VENTOLIN HFA) 90 mcg/act inhaler inhale 2 puffs by mouth and INTO THE LUNGS every 6 hours if needed for cough - use for 5 days      clonazePAM (KlonoPIN) 0 5 mg tablet Take 0 5 mg (1 Tablet) up to 3 times per day as needed for anxiety 90 tablet 2    cloNIDine (CATAPRES) 0 2 mg tablet Take 1 tablet (0 2 mg total) by mouth daily at bedtime 90 tablet 1    montelukast (SINGULAIR) 10 mg tablet Take 1 tablet (10 mg total) by mouth daily at bedtime 90 tablet 0     No current facility-administered medications for this visit  Allergies   Allergen Reactions    Citalopram Nausea Only     Annotation - [id number]: nausea    Codeine Sulfate Tremor    Flonase [Fluticasone] Facial Swelling    Zoloft [Sertraline] Abdominal Pain       Review of Systems    Video Exam    There were no vitals filed for this visit  Physical Exam     Behavioral Health Psychotherapy Progress Note    Psychotherapy Provided: Individual Psychotherapy     1  Generalized anxiety disorder            Goals addressed in session: Goal 1     DATA: Client presented for a telehealth session via [employer]  Client reported that her son is now living with his dad in [place]  Client spoke in-length the events that took place for this to happen  She mentioned that he has been acting out and being disrespectful  She is hoping that this change will benefit him for the better  She noted that his dad had stepped up and they are in communication with one another  She highlighted that they are able to co-parent, which is something that they were unable to do before  Client expressed that she is happy that he is out of his old school district, emphasizing that they constantly had issues with the school and assistant principle  Client voiced that she was initially hurt when deciding to have her son live with his dad   However, after much consideration; client is "happy and knows this is what is best for him  Client is taking the time to focus on herself and will re-evaluate the situation in August  In the meantime, client plans to work more hours, handout with friends and plan some trips  She is flying to [place], [place] at the end of this month  During this session, this clinician used the following therapeutic modalities: Client-centered Therapy and Cognitive Behavioral Therapy    Substance Abuse was not addressed during this session  If the client is diagnosed with a co-occurring substance use disorder, please indicate any changes in the frequency or amount of use: N/A  Stage of change for addressing substance use diagnoses: No substance use/Not applicable    ASSESSMENT:  Faina Barrett presents with a Euthymic/ normal mood  her affect is Normal range and intensity, which is congruent, with her mood and the content of the session  The client has made progress on their goals  Faina Barrett presents with a low risk of suicide, low risk of self-harm, and low risk of harm to others  For any risk assessment that surpasses a \"low\" rating, a safety plan must be developed  A safety plan was indicated: no  If yes, describe in detail N/A    PLAN: Between sessions, Faina Barrett will continue to utilize coping skills to manage stress/anxiety  Will focus on taking care of herself and check-in with her son periodically  At the next session, the therapist will use Client-centered Therapy and Cognitive Behavioral Therapy to address anxiety  Behavioral Health Treatment Plan and Discharge Planning: Faina Barrett is aware of and agrees to continue to work on their treatment plan  They have identified and are working toward their discharge goals   yes    Visit start and stop times:    05/02/23  Start Time: 0800  Stop Time: 0843  Total Visit Time: 43 minutes      "

## 2023-05-05 ENCOUNTER — EVALUATION (OUTPATIENT)
Dept: PHYSICAL THERAPY | Facility: CLINIC | Age: 33
End: 2023-05-05

## 2023-05-05 DIAGNOSIS — S86.012D RUPTURE OF LEFT ACHILLES TENDON, SUBSEQUENT ENCOUNTER: Primary | ICD-10-CM

## 2023-05-05 DIAGNOSIS — S86.019A RUPTURE OF ACHILLES TENDON, UNSPECIFIED LATERALITY, INITIAL ENCOUNTER: ICD-10-CM

## 2023-05-05 NOTE — PROGRESS NOTES
PT Re-Evaluation     Today's date: 2023  Patient name: Zarina Cornejo  : 1990  MRN: [id number]  Referring provider: Katiana Sparks DO  Dx:   Encounter Diagnosis     ICD-10-CM    1  Rupture of left Achilles tendon, subsequent encounter  S86 012D                      Assessment  Assessment details: Zarina Cornejo is a 33 y o  female who presents with increased L ankle/achilles pain consistent with referring diagnosis of Achilles tendon rupture, left, subsequent encounter  (primary encounter diagnosis)  Rupture of Achilles tendon, unspecified laterality, initial encounter that is moderately complex secondary to traumatic onset, moderate fear avoidance and pain levels  Clinically demonstrates decreased L ankle ROM, decreased L ankle strength, decreased L LE proprioception leading to pain with ADLs and exercise  This suggests the need for skilled OPPT to address the above listed impairments, achieve established goals and return to PLOF pain-free  If you have any questions or concerns please contact me at [phone]  Thank you! Re-assessment 23:  Brissa Myrick has returned to OPPT and reports a 50% GROC  Clinically demonstrates improved L ankle ROM, strength and stability; but continued L ankle weakness with PF with dynamic activity  This limits her ability to run or jump required for exercise and work related activity  This suggests the need for continued L ankle strength and progress as able      Impairments: abnormal coordination, abnormal gait, abnormal muscle firing, abnormal muscle tone, abnormal or restricted ROM, activity intolerance, impaired balance, impaired physical strength, lacks appropriate home exercise program, pain with function and weight-bearing intolerance    Symptom irritability: moderateUnderstanding of Dx/Px/POC: good   Prognosis: good    Goals  Short Term Goals (4 weeks)  1 ) Establish independence with HEP  2 ) Decrease subjective pain levels from NPRS at least to 2-5/10 at rest and with activity  3 ) Improve L ankle ROM at least 5-10 degrees into all planes to allow for improved ease of movement with less guarding    Long Term Goals (8 weeks)  1 ) Improve L ankle ROM to WNL in all planes to restore normal movement with ADLs and function  2 ) Improve L ankle strength to 5/5 in all planes in order to return to pain-free ADLs and function  3 ) Improve FOTO score at least to 75 points showing improved self reported disability     Plan  Plan details: Initiate POC for L ankle ROM, strength; proprioception; monitor sxs and progress as able  Patient would benefit from: skilled physical therapy and PT eval  Planned modality interventions: biofeedback, cryotherapy, electrical stimulation/Russian stimulation and TENS  Planned therapy interventions: abdominal trunk stabilization, activity modification, ADL retraining, ADL training, balance, balance/weight bearing training, behavior modification, breathing training, IADL retraining, joint mobilization, manual therapy, muscle pump exercises, neuromuscular re-education, orthotic management and training, patient education, postural training, self care, sensory integrative techniques, strengthening, stretching, therapeutic activities, therapeutic exercise, therapeutic training, home exercise program, graded motor, graded exercise, graded activity, functional ROM exercises, flexibility, coordination and compression  Frequency: 2x week  Duration in visits: 16  Duration in weeks: 8  Plan of Care beginning date: 2/16/2023  Plan of Care expiration date: 4/13/2023  Treatment plan discussed with: patient        Subjective Evaluation    History of Present Illness  Date of onset: 1/9/2023  Date of surgery: 1/12/2023  Mechanism of injury: surgery  Mechanism of injury: Janet Ji is a 33 y o  female who presents with increased L achilles rupture while performing home exercise  Notes that she felt a sharp pain in the back of her achilles    Notes she was able to walk but had increased swelling  Decided to seek out ER consult at [facility] the following day and scheduled surgery 3 days later and had to stay overnight due to anesthesia  D/C'ed home with crutches  Notes she was able to put [facility] on her foot in a cast a few days post surgery  Had staples removed and put a walking boot  Has been in a CAM boot for 2 weeks with wedges  Had recent F/U with MD and script for OPPT provided  Denies night pain or changes in bowel or bladder function  Reports increased L lateral foot pain and numbness NT signs or sxs- but getting improved sensation in her foot  F/U with MD in 1 month  Wishes to return to PLOF pain-free- get back to exercise  Re-assessment 23:  Bertha Benson has returned to OPPT and reports a 50% GROC  Able to walk without boot, still unable to run; continues to have tightness and stiffness with occasional limping in the AM improved with walking and ambulation  Unable to jump and perform exercise  Notes stiffness with sitting then transitions to walking afterward  No F/U with MD to date- told to call in 4 months for a check in if things are still sore  Now 11 weeks post op  Not a recurrent problem   Quality of life: good    Pain  Current pain ratin  At best pain ratin  At worst pain ratin  Location: L ankle  Quality: dull ache, throbbing and radiating  Relieving factors: support and rest  Progression: improved    Social Support  Steps to enter house: yes  1  Stairs in house: yes   13  Lives in: multiple-level home  Lives with: 15year old      Employment status: not working  Exercise history: 3 days a week   Life stress: High       Diagnostic Tests  MRI studies: abnormal  Treatments  Current treatment: physical therapy  Patient Goals  Patient goals for therapy: decreased edema, decreased pain, improved balance, increased motion, increased strength, independence with ADLs/IADLs and return to sport/leisure activities  Patient "goal: Get back to normal and work         Objective     Neurological Testing     Sensation     Ankle/Foot   Left Ankle/Foot   Paresthesia: light touch    Right Ankle/Foot   Intact: light touch     Comments   Left light touch: Lateral foot   Active Range of Motion   Left Ankle/Foot   Dorsiflexion (ke): 12 degrees   Plantar flexion: 40 degrees   Inversion: 32 degrees   Eversion: 17 degrees   Great toe extension: 75 degrees     Right Ankle/Foot   Dorsiflexion (kf): 15 degrees   Plantar flexion: 45 degrees   Inversion: 27 degrees   Eversion: 10 degrees   Great toe extension: 54 degrees     Strength/Myotome Testing     Left Ankle/Foot   Dorsiflexion: 5  Plantar flexion: 5  Inversion: 4+  Eversion: 5    Right Ankle/Foot   Dorsiflexion: 5  Plantar flexion: 5  Inversion: 5  Eversion: 5    Swelling   Left Ankle/Foot   Figure 8: 45 cm    Right Ankle/Foot   Figure 8: 45 cm    Functional Assessment      Squat    Left within functional limits and right within functional limits  Single Leg Stance   Left: 5 seconds  Right: 20 seconds             Precautions: WBAT;   EPOC: 4/12/23  HEP: Access Code: [email or web address]  URL: [email or web address]  Date: 02/16/2023  Prepared by: Joey Mayen    Exercises   Long Sitting Calf Stretch with Strap - 1 x daily - 7 x weekly - 3 sets - 10 reps   Isometric Ankle Eversion with Self Resistance - 1 x daily - 7 x weekly - 3 sets - 10 reps   Isometric Ankle Inversion - 1 x daily - 7 x weekly - 3 sets - 10 reps   Seated Heel Raise - 1 x daily - 7 x weekly - 3 sets - 10 reps   Seated Toe Raise - 1 x daily - 7 x weekly - 3 sets - 10 reps   Long Sitting Ankle Plantar Flexion with Resistance - 1 x daily - 7 x weekly - 3 sets - 10 reps          Manuals 2/16 2/23 3/2 3/9 3/23 5/5       L ankle PROM  WE JR PF WE PF       Gastroc STM  WE  PF WE PF       Re-assess      PF                    Neuro Re-Ed             Tandem balance  30\"x3 ea 30\"x3 30\"x3 30\"x3        SLS  30\"x3 30\"x3 30\"x3 30\"x3  " "                                                                       Ther Ex             Weight shifting f-b, s-s  x20 ea 20ea 20x         Ankle Isometrics  5\"x20 ea 20x5\" ea 20x5\"          Ankle AROM  x20 ea 20ea BFR TB GTB 75 reps BFR TB GTB 75 reps        B HR w/ UE support   insturcted BFR 75 reps BFR 75 reps        Static lunge    BFR 75 reps BFR 75 reps        SL HR with UE assistance     x20        Leg Press HR with Wedge     BFR  25#  75 reps                     Ther Activity             Bike    5 min  5 min                     Gait Training                                       Modalities                                            "

## 2023-05-16 ENCOUNTER — SOCIAL WORK (OUTPATIENT)
Dept: BEHAVIORAL/MENTAL HEALTH CLINIC | Facility: CLINIC | Age: 33
End: 2023-05-16

## 2023-05-16 DIAGNOSIS — F41.1 GENERALIZED ANXIETY DISORDER: Primary | ICD-10-CM

## 2023-05-16 NOTE — PSYCH
Behavioral Health Psychotherapy Progress Note    Psychotherapy Provided: Individual Psychotherapy     1  Generalized anxiety disorder            Goals addressed in session: Goal 1     DATA: Client presented for an in-person session  Client reported that things have been going well overall  Client shared that she saw her son for Mother's Day and how nice it was to see him  Client expressed that he had gotten suspended from school for fighting, and how she had to drive to [place] to talk to the principle  She noted that she still wants to be as involved as possible with him  Client voiced that she received multiple stories regarding the fight and is unsure who to believe  Client addressed that she limits her contact with her son's father and has been talking more to his stepmother  Client found out that his dad had missed his IEP meeting and how frustrated she was with him  She emphasized the importance of those meetings and was disappointed that he did not attend  Client spoke in-length about a maddie she started to hangout with  She mentioned that they knew each other from high school, but were not really friends then  Client addressed that they are taking things slowly  Client also shared that she has been working more and plans to take several trips this summer  During this session, this clinician used the following therapeutic modalities: Client-centered Therapy and Cognitive Behavioral Therapy    Substance Abuse was not addressed during this session  If the client is diagnosed with a co-occurring substance use disorder, please indicate any changes in the frequency or amount of use: N/A  Stage of change for addressing substance use diagnoses: No substance use/Not applicable    ASSESSMENT:  Kacie Cooper presents with a Euthymic/ normal mood  her affect is Normal range and intensity, which is congruent, with her mood and the content of the session  The client has made progress on their goals       Brandt Langston "Arta Fabry presents with a low risk of suicide, low risk of self-harm, and low risk of harm to others  For any risk assessment that surpasses a \"low\" rating, a safety plan must be developed  A safety plan was indicated: no  If yes, describe in detail N/A    PLAN: Between sessions, Amanda Walters will continue to utilize coping skills to manage stress/anxiety  Will continue to set boundaries with her son and check-in on him on occasion  At the next session, the therapist will use Client-centered Therapy and Cognitive Behavioral Therapy to address anxiety  Behavioral Health Treatment Plan and Discharge Planning: Amanda Walters is aware of and agrees to continue to work on their treatment plan  They have identified and are working toward their discharge goals   yes    Visit start and stop times:    05/16/23  Start Time: 1006  Stop Time: 1046  Total Visit Time: 40 minutes  "

## 2023-05-17 ENCOUNTER — OFFICE VISIT (OUTPATIENT)
Dept: PHYSICAL THERAPY | Facility: CLINIC | Age: 33
End: 2023-05-17

## 2023-05-17 DIAGNOSIS — S86.012D RUPTURE OF LEFT ACHILLES TENDON, SUBSEQUENT ENCOUNTER: Primary | ICD-10-CM

## 2023-05-17 DIAGNOSIS — S86.019A RUPTURE OF ACHILLES TENDON, UNSPECIFIED LATERALITY, INITIAL ENCOUNTER: ICD-10-CM

## 2023-05-17 NOTE — PROGRESS NOTES
"Daily Note     Today's date: 2023  Patient name: Sheffield Canavan  : 1990  MRN: [id number]  Referring provider: Leonel Gottron, DO  Dx:   Encounter Diagnosis     ICD-10-CM    1  Rupture of left Achilles tendon, subsequent encounter  S86 012D       2  Rupture of Achilles tendon, unspecified laterality, initial encounter  S86 019A                      Subjective: Feeling good overall, still with slight weakness noted  Objective: See treatment diary below      Assessment: Continues to have pain and discomfort with performance of SL HR  Still with weakness with performance toe walking and side stepping/shuffling  Able to perform ladder agility today without difficulty  Will continue to work on [id number] as able  Plan: Continue per plan of care  Progress with SL strength of HR  Precautions: WBAT;   EPOC: 23  HEP: Access Code: [email or web address]  URL: [email or web address]  Date: 2023  Prepared by: Symone Huynh    Exercises  • Long Sitting Calf Stretch with Strap - 1 x daily - 7 x weekly - 3 sets - 10 reps  • Isometric Ankle Eversion with Self Resistance - 1 x daily - 7 x weekly - 3 sets - 10 reps  • Isometric Ankle Inversion - 1 x daily - 7 x weekly - 3 sets - 10 reps  • Seated Heel Raise - 1 x daily - 7 x weekly - 3 sets - 10 reps  • Seated Toe Raise - 1 x daily - 7 x weekly - 3 sets - 10 reps  • Long Sitting Ankle Plantar Flexion with Resistance - 1 x daily - 7 x weekly - 3 sets - 10 reps          Manuals 2/16 2/23 3/2 3/9 3/23 5/5 5/17      L ankle PROM  WE JR PF WE PF PF      Gastroc STM  WE  PF WE PF PF      Re-assess      PF                    Neuro Re-Ed             Tandem balance  30\"x3 ea 30\"x3 30\"x3 30\"x3  3x30\"      SLS  30\"x3 30\"x3 30\"x3 30\"x3  3x30\"                                                                       Ther Ex             Weight shifting f-b, s-s  x20 ea 20ea 20x   20x      Ankle Isometrics  5\"x20 ea 20x5\" ea 20x5\"    20x5\"      Ankle AROM  x20 ea 20ea " BFR TB GTB 75 reps BFR TB GTB 75 reps  BFR GTB 75 reps       B HR w/ UE support   insturcted BFR 75 reps BFR 75 reps  BFR 75 reps       Static lunge    BFR 75 reps BFR 75 reps  BFR 75 reps       SL HR with UE assistance     x20  20x      Leg Press HR with Wedge     BFR  25#  75 reps  BFR 25# 75 reps                    Ther Activity             Bike    5 min  5 min  5 min                    Gait Training                                       Modalities

## 2023-05-24 ENCOUNTER — OFFICE VISIT (OUTPATIENT)
Dept: PHYSICAL THERAPY | Facility: CLINIC | Age: 33
End: 2023-05-24

## 2023-05-24 DIAGNOSIS — S86.019A RUPTURE OF ACHILLES TENDON, UNSPECIFIED LATERALITY, INITIAL ENCOUNTER: ICD-10-CM

## 2023-05-24 DIAGNOSIS — S86.012D RUPTURE OF LEFT ACHILLES TENDON, SUBSEQUENT ENCOUNTER: Primary | ICD-10-CM

## 2023-05-24 NOTE — PROGRESS NOTES
"Daily Note     Today's date: 2023  Patient name: Coni Carroll  : 1990  MRN: [id number]  Referring provider: Larisa Guerrero DO  Dx:   Encounter Diagnosis     ICD-10-CM    1  Rupture of left Achilles tendon, subsequent encounter  S86 012D       2  Rupture of Achilles tendon, unspecified laterality, initial encounter  S86 019A                      Subjective: Feeling good, no adverse pain or discomfort  Objective: See treatment diary below      Assessment: Progression with performance of dynamic agility without sharp pain or discomfort with ladder movements  Continues to have slight compensation with post tib substitution with toe in HR at times but less with cues  Able to initiate performance of trampoline jogging without difficulty  Plan: Continue per plan of care  Precautions: WBAT;   EPOC: 23  HEP: Access Code: [email or web address]  URL: [email or web address]  Date: 2023  Prepared by: Fercho Alt    Exercises  • Long Sitting Calf Stretch with Strap - 1 x daily - 7 x weekly - 3 sets - 10 reps  • Isometric Ankle Eversion with Self Resistance - 1 x daily - 7 x weekly - 3 sets - 10 reps  • Isometric Ankle Inversion - 1 x daily - 7 x weekly - 3 sets - 10 reps  • Seated Heel Raise - 1 x daily - 7 x weekly - 3 sets - 10 reps  • Seated Toe Raise - 1 x daily - 7 x weekly - 3 sets - 10 reps  • Long Sitting Ankle Plantar Flexion with Resistance - 1 x daily - 7 x weekly - 3 sets - 10 reps          Manuals 2/16 2/23 3/2 3/9 3/23 5/5 5/17 5/24     L ankle PROM  WE JR PF WE PF PF PF     Gastroc STM  WE  PF WE PF PF PF     Re-assess      PF                    Neuro Re-Ed             Tandem balance  30\"x3 ea 30\"x3 30\"x3 30\"x3  3x30\" 3x30\"     SLS  30\"x3 30\"x3 30\"x3 30\"x3  3x30\" 3x30\"                                                                      Ther Ex             Weight shifting f-b, s-s  x20 ea 20ea 20x   20x      Ankle Isometrics  5\"x20 ea 20x5\" ea 20x5\"    20x5\" 20x5\"   " Ankle AROM  x20 ea 20ea BFR TB GTB 75 reps BFR TB GTB 75 reps  BFR GTB 75 reps       B HR w/ UE support   insturcted BFR 75 reps BFR 75 reps  BFR 75 reps  Slant 30x     Static lunge    BFR 75 reps BFR 75 reps  BFR 75 reps       SL HR with UE assistance     x20  20x 20x     Leg Press HR with Wedge     BFR  25#  75 reps  BFR 25# 75 reps                    Ther Activity             Bike    5 min  5 min  5 min  5 min TM incline     Side shuffles, toe walking fwd/bkwd; carioca        5 min      Trampoline high knees        5 min      Ladder agility         5 min      Gait Training                                       Modalities

## 2023-05-31 ENCOUNTER — TELEPHONE (OUTPATIENT)
Dept: PSYCHIATRY | Facility: CLINIC | Age: 33
End: 2023-05-31

## 2023-05-31 NOTE — TELEPHONE ENCOUNTER
Patient is calling regarding cancelling an appointment      Date/Time: 6/1/23    Reason: no reason given on the message    Patient was rescheduled: YES [] NO [x]  If yes, when was Patient reschedule for:     Patient requesting call back to reschedule: YES [x] NO []

## 2023-06-02 ENCOUNTER — OFFICE VISIT (OUTPATIENT)
Dept: PHYSICAL THERAPY | Facility: CLINIC | Age: 33
End: 2023-06-02

## 2023-06-02 DIAGNOSIS — S86.012D RUPTURE OF LEFT ACHILLES TENDON, SUBSEQUENT ENCOUNTER: Primary | ICD-10-CM

## 2023-06-02 DIAGNOSIS — S86.019A RUPTURE OF ACHILLES TENDON, UNSPECIFIED LATERALITY, INITIAL ENCOUNTER: ICD-10-CM

## 2023-06-02 NOTE — PROGRESS NOTES
"Daily Note     Today's date: 2023  Patient name: Ida Sherman  : 1990  MRN: [id number]  Referring provider: Cole Lovell DO  Dx:   Encounter Diagnosis     ICD-10-CM    1  Rupture of left Achilles tendon, subsequent encounter  S86 012D       2  Rupture of Achilles tendon, unspecified laterality, initial encounter  S86 019A                      Subjective: Feeling pretty good overall, denies any major discomfort  Objective: See treatment diary below      Assessment: With any impact loading of L LE still getting compensatory landing on R LE to off load impact  Good SL HR strength of 20 reps without any major discomfort  Able to progress with TM interval jogging today but with excessive mid foot/heel strike and limited push off on L LE  Will continue to benefit from strengthening of L LE as able  Plan: Continue per plan of care  Continue with strengthening as able  Precautions: WBAT;   EPOC: 23  HEP: Access Code: [email or web address]  URL: [email or web address]  Date: 2023  Prepared by: Daniel Iniguez    Exercises  • Long Sitting Calf Stretch with Strap - 1 x daily - 7 x weekly - 3 sets - 10 reps  • Isometric Ankle Eversion with Self Resistance - 1 x daily - 7 x weekly - 3 sets - 10 reps  • Isometric Ankle Inversion - 1 x daily - 7 x weekly - 3 sets - 10 reps  • Seated Heel Raise - 1 x daily - 7 x weekly - 3 sets - 10 reps  • Seated Toe Raise - 1 x daily - 7 x weekly - 3 sets - 10 reps  • Long Sitting Ankle Plantar Flexion with Resistance - 1 x daily - 7 x weekly - 3 sets - 10 reps          Manuals  3/2 3/9 3/23 5/5 5/17 5/24 6/2    L ankle PROM  WE JR PF WE PF PF PF PF    Gastroc STM  WE  PF WE PF PF PF PF    Re-assess      PF                    Neuro Re-Ed             Tandem balance  30\"x3 ea 30\"x3 30\"x3 30\"x3  3x30\" 3x30\"     SLS  30\"x3 30\"x3 30\"x3 30\"x3  3x30\" 3x30\"                                                                      Ther Ex             Weight " "shifting f-b, s-s  x20 ea 20ea 20x   20x      Ankle Isometrics  5\"x20 ea 20x5\" ea 20x5\"    20x5\" 20x5\"     Ankle AROM  x20 ea 20ea BFR TB GTB 75 reps BFR TB GTB 75 reps  BFR GTB 75 reps       B HR w/ UE support   insturcted BFR 75 reps BFR 75 reps  BFR 75 reps  Slant 30x Slant SL 20x2    Static lunge    BFR 75 reps BFR 75 reps  BFR 75 reps       SL HR with UE assistance     x20  20x 20x     Leg Press HR with Wedge     BFR  25#  75 reps  BFR 25# 75 reps       Jump ropes AP, ML and squat jump         20x ea    Ther Activity             Bike    5 min  5 min  5 min  5 min TM incline TM interval jog 5 min     Side shuffles, toe walking fwd/bkwd; carioca        5 min  5 min     Trampoline high knees        5 min      Ladder agility         5 min  5 min     Gait Training                                       Modalities                                                     "

## 2023-06-12 ENCOUNTER — SOCIAL WORK (OUTPATIENT)
Dept: BEHAVIORAL/MENTAL HEALTH CLINIC | Facility: CLINIC | Age: 33
End: 2023-06-12
Payer: COMMERCIAL

## 2023-06-12 DIAGNOSIS — F41.1 GENERALIZED ANXIETY DISORDER: Primary | ICD-10-CM

## 2023-06-12 PROCEDURE — 90834 PSYTX W PT 45 MINUTES: CPT | Performed by: SOCIAL WORKER

## 2023-06-12 NOTE — PSYCH
"Behavioral Health Psychotherapy Progress Note    Psychotherapy Provided: Individual Psychotherapy     1  Generalized anxiety disorder            Goals addressed in session: Goal 1     DATA: Client presented for an in-person session  Client reported feeling very frustrated with her son, stating that she had gone to visit him over the weekend  She mentioned that she had bought him a few necessities and thought things were going well  Client noted that since she had been home; he has been calling her nonstop and requesting that she buy him more things  Client addressed how entitled he is and that she is not going to cater to his every need  She emphasized the importance of creating and establishing firm boundaries with him and how he is with his dad for a reason  Client reach out to son's father and requested that he disciplines him appropriately and to not allow him to reach out unless if there is an emergency  Client talked about work and how she has been working more  She also talked about her \"boyfriend,\" although they are not labeling their relationship  Client expressed frustration with him and his lack of communication with her  Clinician actively listened, provided emotional support and encouraged client to utilize coping skills when feeling stressed/frustrated  During this session, this clinician used the following therapeutic modalities: Client-centered Therapy and Cognitive Behavioral Therapy    Substance Abuse was not addressed during this session  If the client is diagnosed with a co-occurring substance use disorder, please indicate any changes in the frequency or amount of use: N/A  Stage of change for addressing substance use diagnoses: No substance use/Not applicable    ASSESSMENT:  Yuliana Zurita presents with a Euthymic/ normal mood  her affect is Normal range and intensity, which is congruent, with her mood and the content of the session  The client has made progress on their goals       Dale Olvera " "Hollie Finn presents with a low risk of suicide, low risk of self-harm, and low risk of harm to others  For any risk assessment that surpasses a \"low\" rating, a safety plan must be developed  A safety plan was indicated: no  If yes, describe in detail N/A    PLAN: Between sessions, Shaquille Lugo will continue to utilize coping skills to manage stress/anxiety  At the next session, the therapist will use Client-centered Therapy and Cognitive Behavioral Therapy to address anxiety  Behavioral Health Treatment Plan and Discharge Planning: Shaquille Lugo is aware of and agrees to continue to work on their treatment plan  They have identified and are working toward their discharge goals   yes    Visit start and stop times:    06/12/23  Start Time: 1104  Stop Time: 1145  Total Visit Time: 41 minutes  "

## 2023-06-16 ENCOUNTER — APPOINTMENT (OUTPATIENT)
Dept: PHYSICAL THERAPY | Facility: CLINIC | Age: 33
End: 2023-06-16
Payer: COMMERCIAL

## 2023-06-19 ENCOUNTER — SOCIAL WORK (OUTPATIENT)
Dept: BEHAVIORAL/MENTAL HEALTH CLINIC | Facility: CLINIC | Age: 33
End: 2023-06-19
Payer: COMMERCIAL

## 2023-06-19 DIAGNOSIS — F43.10 POST TRAUMATIC STRESS DISORDER (PTSD): ICD-10-CM

## 2023-06-19 DIAGNOSIS — F41.1 GENERALIZED ANXIETY DISORDER: Primary | ICD-10-CM

## 2023-06-19 PROCEDURE — 90834 PSYTX W PT 45 MINUTES: CPT | Performed by: SOCIAL WORKER

## 2023-06-19 NOTE — PSYCH
"Behavioral Health Psychotherapy Progress Note    Psychotherapy Provided: Individual Psychotherapy     1  Generalized anxiety disorder        2  Post traumatic stress disorder (PTSD)            Goals addressed in session: Goal 1     DATA: Client presented for an in-person session  Client reported that things have been going well overall  She highlighted that she and her son's father are able to co-parent, be cordial with one another and communicate effectively  Client shared that her son will be traveling down to [place]; to spend time with his grandparents until August  She addressed that [place] the country and have limited wi-fi and other entertainment  Client hopes that this trip will help him recognize how \"good he has it,\" stating that he currently feels entitled  Client also spoke about the person that she is seeing and how they have been able to resolve conflict  Client is planning a trip to [place], [place] for the weekend with her cousin  Client and clinician spent most of session updating and completing treatment and crisis plans  During this session, this clinician used the following therapeutic modalities: Client-centered Therapy and Cognitive Behavioral Therapy    Substance Abuse was not addressed during this session  If the client is diagnosed with a co-occurring substance use disorder, please indicate any changes in the frequency or amount of use: N/A  Stage of change for addressing substance use diagnoses: No substance use/Not applicable    ASSESSMENT:  Molina Malin presents with a Euthymic/ normal mood  her affect is Normal range and intensity, which is congruent, with her mood and the content of the session  The client has made progress on their goals  Molina Malin presents with a low risk of suicide, low risk of self-harm, and low risk of harm to others  For any risk assessment that surpasses a \"low\" rating, a safety plan must be developed      A safety plan was indicated: no  If " yes, describe in detail N/A    PLAN: Between sessions, Stan Alan will continue to utilize coping skills to manage stress/anxiety  At the next session, the therapist will use Client-centered Therapy and Cognitive Behavioral Therapy to address anxiety  Behavioral Health Treatment Plan and Discharge Planning: Stan Alan is aware of and agrees to continue to work on their treatment plan  They have identified and are working toward their discharge goals   yes    Visit start and stop times:    06/19/23  Start Time: 1107  Stop Time: 1155  Total Visit Time: 48 minutes

## 2023-06-19 NOTE — BH TREATMENT PLAN
Outpatient   [address]  1990     Date of Initial Psychotherapy Assessment: February 6, 2023  Date of Current Treatment Plan: 06/19/23  Treatment Plan Target Date: September 2023   Treatment Plan Expiration Date: December 2023    Diagnosis:   1  Generalized anxiety disorder        2  Post traumatic stress disorder (PTSD)            Area(s) of Need: Stop jumping to conclusions, not let other people dictate my mood and continue to work on/establish boundaries    Long Term Goal 1 (in the client's own words): Stop myself from jumping to conclusions    Stage of Change: Contemplation    Target Date for completion: September 2023      Anticipated therapeutic modalities: CBT     People identified to complete this goal: Self      Objective 1: (identify the means of measuring success in meeting the objective): Work on not assuming that I know the outcome      Objective 2: (identify the means of measuring success in meeting the objective): Practice mindfulness to help me think things through      Long Term Goal 2 (in the client's own words): Don't allow others to dictate my mood    Stage of Change: Pre-contemplation    Target Date for completion: September Anticipated therapeutic modalities: Anger management     People identified to complete this goal: Self      Objective 1: (identify the means of measuring success in meeting the objective): Utilize anger management skills       Objective 2: (identify the means of measuring success in meeting the objective): Practice different coping skills     Long Term Goal 3 (in the client's own words): Continue to work on establishing/creating boundaries    Stage of Change: Contemplation    Target Date for completion: September 2023     Anticipated therapeutic modalities: DBT      People identified to complete this goal: Self      Objective 1: (identify the means of measuring success in meeting the objective):  Interpersonal Effectiveness      Objective 2: (identify the means of measuring success in meeting the objective): Identify what boundaries need to be placed and on whom      I am currently under the care of a [facility]'s psychiatric provider: no    My [facility]'s psychiatric provider is: N/A    I am currently taking psychiatric medications: No    I feel that I will be ready for discharge from mental health care when I reach the following (measurable goal/objective): When I have better control of myself    For children and adults who have a legal guardian:   Has there been any change to custody orders and/or guardianship status? No  If yes, attach updated documentation  I have created my Crisis Plan and have been offered a copy of this plan    [address]: Diagnosis and Treatment Plan explained to Trish Colon acknowledges an understanding of their diagnosis  Jaclyn Nava agrees to this treatment plan  I have been offered a copy of this Treatment Plan   no

## 2023-06-19 NOTE — BH CRISIS PLAN
"Client Name: Stan Alan       Client YOB: 1990  : 1990    Treatment Team (include name and contact information):     Psychotherapist: Guilherme Bangura    Psychiatrist: N/A   Release of information completed: no    \" N/A   Release of information completed: no    Other (Specify Role): N/A    Release of information completed: no    Other (Specify Role): N/A   Release of information completed: no    Healthcare Provider  Isis Landrum, [address]  [phone]    Type of Plan   * Child plans (children 15 yo and younger) must be completed and signed by the child's legal guardian   * Plans for all individuals 15 yo and above must be signed by the client  Plan Type: adolescent/adult (15 and over) Initial      My Personal Strengths are (in the client's own words):  \"I am very strong, positive person, resilient, I have the hustle and being a mom\"  The stressors and triggers that may put me at risk are:  Calvin [place] raising their voice, people causing drama and dragging me into it (disturb my peace)    Coping skills I can use to keep myself calm and safe:  Listen to music, Call a friend or family member and Other (describe) Sleep    Coping skills/supports I can use to maintain abstinence from substance use:   Not Applicable    The people that provide me with help and support: (Include name, contact, and how they can help)   Support person #1: My cousin, April    * Phone number: in cell phone    * How can they help me? She's more than just my cousin   Support person #2:Work friends    * Phone number: in cell phone    * How can they help me? Supportive     Support person #3: N/A    * Phone number: N/A    * How can they help me?  N/A    In the past, the following has helped me in times of crisis:    Being with other people, Calling a friend and Listening to music      If it is an emergency and you need immediate help, call     If there is a possibility of " danger to yourself or others, call the following crisis hotline resources:     Adult Crisis Numbers  Suicide Prevention Hotline - Dial 9-8-8  [facility]: [address]: [address]: [address]: [phone]  [facility] ([facility]): [phone]  [facility]: [address]: [address] [place] [address] St: [phone] (daytime)  [phone] (after hours, weekends, holidays)     Child/Adolescent Crisis Numbers   [facility]: [address]ie [address]: [phone]   Blanca Rinconory: [phone]   [facility] ([facility]): [phone]    Please note: Some [facility] do not have a separate number for Child/Adolescent specific crisis  If your county is not listed under Child/Adolescent, please call the adult number for your county     National Talk to Text Line   All Ages - [phone]    In the event your feelings become unmanageable, and you cannot reach your support system, you will call 911 immediately or go to the nearest hospital emergency room

## 2023-06-20 ENCOUNTER — APPOINTMENT (OUTPATIENT)
Dept: PHYSICAL THERAPY | Facility: CLINIC | Age: 33
End: 2023-06-20
Payer: COMMERCIAL

## 2023-06-28 NOTE — PROCEDURE: DEFER
Detail Level: Simple
Introduction Text (Please End With A Colon): The following procedure was deferred:
Procedure To Be Performed At Next Visit: Intralesional Kenalog
Thalidomide Counseling: I discussed with the patient the risks of thalidomide including but not limited to birth defects, anxiety, weakness, chest pain, dizziness, cough and severe allergy.

## 2023-06-29 ENCOUNTER — EVALUATION (OUTPATIENT)
Dept: PHYSICAL THERAPY | Facility: CLINIC | Age: 33
End: 2023-06-29
Payer: COMMERCIAL

## 2023-06-29 DIAGNOSIS — S86.019A RUPTURE OF ACHILLES TENDON, UNSPECIFIED LATERALITY, INITIAL ENCOUNTER: ICD-10-CM

## 2023-06-29 DIAGNOSIS — S86.012D RUPTURE OF LEFT ACHILLES TENDON, SUBSEQUENT ENCOUNTER: Primary | ICD-10-CM

## 2023-06-29 PROCEDURE — 97110 THERAPEUTIC EXERCISES: CPT | Performed by: PHYSICAL THERAPIST

## 2023-06-29 PROCEDURE — 97140 MANUAL THERAPY 1/> REGIONS: CPT | Performed by: PHYSICAL THERAPIST

## 2023-06-29 NOTE — PROGRESS NOTES
PT Re-Evaluation     Today's date: 2023  Patient name: Meseret Lewis  : 1990  MRN: [id number]  Referring provider: Robin Mayorga DO  Dx:   Encounter Diagnosis     ICD-10-CM    1  Rupture of left Achilles tendon, subsequent encounter  S86 012D       2  Rupture of Achilles tendon, unspecified laterality, initial encounter  S86 019A                      Assessment  Assessment details: Meseret Lewis is a 33 y o  female who presents with increased L ankle/achilles pain consistent with referring diagnosis of Achilles tendon rupture, left, subsequent encounter  (primary encounter diagnosis)  Rupture of Achilles tendon, unspecified laterality, initial encounter that is moderately complex secondary to traumatic onset, moderate fear avoidance and pain levels  Clinically demonstrates decreased L ankle ROM, decreased L ankle strength, decreased L LE proprioception leading to pain with ADLs and exercise  This suggests the need for skilled OPPT to address the above listed impairments, achieve established goals and return to PLOF pain-free  If you have any questions or concerns please contact me at [phone]  Thank you! Re-assessment 23:  Nino Epperson has returned to OPPT and reports a 50% GROC  Clinically demonstrates improved L ankle ROM, strength and stability; but continued L ankle weakness with PF with dynamic activity  This limits her ability to run or jump required for exercise and work related activity  This suggests the need for continued L ankle strength and progress as able  Re-assessment 23:  Nino Epperson has attended 9 visits since the initiation of OPPT and reports a 90% GROC  Clinically her L ankle is functionally strong and has normal AROM in all planes  She still has slight apprehension with performance of jumping and running but is able to initiate performance without difficulty  She is eager to return to the gym but apprehensive with how things will affect her pain levels    This suggests that she will benefit from at least 1 more visit to solidify her return to independence  Impairments: abnormal coordination, abnormal gait, abnormal muscle firing, abnormal muscle tone, abnormal or restricted ROM, activity intolerance, impaired balance, impaired physical strength, lacks appropriate home exercise program, pain with function and weight-bearing intolerance    Symptom irritability: moderateUnderstanding of Dx/Px/POC: good   Prognosis: good    Goals  Short Term Goals (4 weeks)  1 ) Establish independence with HEP- MET  2 ) Decrease subjective pain levels from NPRS at least to 2-5/10 at rest and with activity- MET  3 ) Improve L ankle ROM at least 5-10 degrees into all planes to allow for improved ease of movement with less guarding- MET    Long Term Goals (8 weeks)  1 ) Improve L ankle ROM to WNL in all planes to restore normal movement with ADLs and function- MET  2 ) Improve L ankle strength to 5/5 in all planes in order to return to pain-free ADLs and function- MET  3 ) Improve FOTO score at least to 75 points showing improved self reported disability - MET    Plan  Plan details: Continue with PT for 1 visit until solid with HEP and gym routine     Patient would benefit from: skilled physical therapy and PT eval  Planned modality interventions: biofeedback, cryotherapy, electrical stimulation/Russian stimulation and TENS  Planned therapy interventions: abdominal trunk stabilization, activity modification, ADL retraining, ADL training, balance, balance/weight bearing training, behavior modification, breathing training, IADL retraining, joint mobilization, manual therapy, muscle pump exercises, neuromuscular re-education, orthotic management and training, patient education, postural training, self care, sensory integrative techniques, strengthening, stretching, therapeutic activities, therapeutic exercise, therapeutic training, home exercise program, graded motor, graded exercise, graded activity, functional ROM exercises, flexibility, coordination and compression  Frequency: 2x week  Duration in visits: 16  Duration in weeks: 8  Plan of Care beginning date: 2023  Plan of Care expiration date: 2023  Treatment plan discussed with: patient        Subjective Evaluation    History of Present Illness  Date of onset: 2023  Date of surgery: 2023  Mechanism of injury: surgery  Mechanism of injury: Cindy Godoy is a 33 y o  female who presents with increased L achilles rupture while performing home exercise  Notes that she felt a sharp pain in the back of her achilles  Notes she was able to walk but had increased swelling  Decided to seek out ER consult at [facility] the following day and scheduled surgery 3 days later and had to stay overnight due to anesthesia  D/C'ed home with crutches  Notes she was able to put [facility] on her foot in a cast a few days post surgery  Had staples removed and put a walking boot  Has been in a CAM boot for 2 weeks with wedges  Had recent F/U with MD and script for OPPT provided  Denies night pain or changes in bowel or bladder function  Reports increased L lateral foot pain and numbness NT signs or sxs- but getting improved sensation in her foot  F/U with MD in 1 month  Wishes to return to PLOF pain-free- get back to exercise  Re-assessment 23:  Serenity Gore has returned to OPPT and reports a 50% GROC  Able to walk without boot, still unable to run; continues to have tightness and stiffness with occasional limping in the AM improved with walking and ambulation  Unable to jump and perform exercise  Notes stiffness with sitting then transitions to walking afterward  No F/U with MD to date- told to call in 4 months for a check in if things are still sore  Now 11 weeks post op        Re-assessment           Not a recurrent problem   Quality of life: good    Pain  Current pain ratin  At best pain ratin  At worst pain rating: 1  Location: L ankle  Quality: dull ache, throbbing and radiating  Relieving factors: support and rest  Progression: improved    Social Support  Steps to enter house: yes  1  Stairs in house: yes   13  Lives in: multiple-level home  Lives with: 15year old  Employment status: not working  Exercise history: 3 days a week   Life stress: High       Diagnostic Tests  MRI studies: abnormal  Treatments  Current treatment: physical therapy  Patient Goals  Patient goals for therapy: decreased edema, decreased pain, improved balance, increased motion, increased strength, independence with ADLs/IADLs and return to sport/leisure activities  Patient goal: Get back to normal and work         Objective     Neurological Testing     Sensation     Ankle/Foot   Left Ankle/Foot   Paresthesia: light touch    Right Ankle/Foot   Intact: light touch     Comments   Left light touch: Lateral foot   Active Range of Motion   Left Ankle/Foot   Dorsiflexion (ke): 13 degrees   Plantar flexion: 40 degrees   Inversion: 40 degrees   Eversion: 28 degrees   Great toe extension: 80 degrees     Right Ankle/Foot   Dorsiflexion (kf): 15 degrees   Plantar flexion: 45 degrees   Inversion: 27 degrees   Eversion: 10 degrees   Great toe extension: 54 degrees     Strength/Myotome Testing     Left Ankle/Foot   Dorsiflexion: 5  Plantar flexion: 5 (SL HR in 30 sec 57 HR)  Inversion: 5  Eversion: 5    Right Ankle/Foot   Dorsiflexion: 5  Plantar flexion: 5 (Sl HR in 30 sec 48)  Inversion: 5  Eversion: 5    Swelling   Left Ankle/Foot   Figure 8: 45 5 cm    Right Ankle/Foot   Figure 8: 45 cm    Functional Assessment      Squat    Left within functional limits and right within functional limits  Single Leg Stance   Left: 5 seconds  Right: 20 seconds             Precautions: WBAT;   EPO: 4/12/23  HEP: Access Code: [email or web address]  URL: [email or web address]  Date: 02/16/2023  Prepared by: Chente Thomas    Exercises  • Long Sitting Calf Stretch with Strap "- 1 x daily - 7 x weekly - 3 sets - 10 reps  • Isometric Ankle Eversion with Self Resistance - 1 x daily - 7 x weekly - 3 sets - 10 reps  • Isometric Ankle Inversion - 1 x daily - 7 x weekly - 3 sets - 10 reps  • Seated Heel Raise - 1 x daily - 7 x weekly - 3 sets - 10 reps  • Seated Toe Raise - 1 x daily - 7 x weekly - 3 sets - 10 reps  • Long Sitting Ankle Plantar Flexion with Resistance - 1 x daily - 7 x weekly - 3 sets - 10 reps          Manuals 2/16 2/23 3/2 3/9 3/23 5/5 5/17 5/24 6/2 6/28   L ankle PROM  WE JR PF WE PF PF PF PF PF   Gastroc STM  WE  PF WE PF PF PF PF PF   Re-assess      PF    PF                Neuro Re-Ed             Tandem balance  30\"x3 ea 30\"x3 30\"x3 30\"x3  3x30\" 3x30\"     SLS  30\"x3 30\"x3 30\"x3 30\"x3  3x30\" 3x30\"  3x30\" star taps                                                                    Ther Ex             Weight shifting f-b, s-s  x20 ea 20ea 20x   20x      Ankle Isometrics  5\"x20 ea 20x5\" ea 20x5\"    20x5\" 20x5\"     Ankle AROM  x20 ea 20ea BFR TB GTB 75 reps BFR TB GTB 75 reps  BFR GTB 75 reps       B HR w/ UE support   insturcted BFR 75 reps BFR 75 reps  BFR 75 reps  Slant 30x Slant SL 20x2 SL 50x ea   Static lunge    BFR 75 reps BFR 75 reps  BFR 75 reps       SL HR with UE assistance     x20  20x 20x     Leg Press HR with Wedge     BFR  25#  75 reps  BFR 25# 75 reps       Jump ropes AP, ML and squat jump         20x ea 50x    Ther Activity             Bike    5 min  5 min  5 min  5 min TM incline TM interval jog 5 min  5 min intervals   Side shuffles, toe walking fwd/bkwd; carioca        5 min  5 min     Trampoline high knees        5 min      Ladder agility         5 min  5 min  5 mi n   Gait Training                                       Modalities                                                     "

## 2023-07-12 DIAGNOSIS — F41.1 GAD (GENERALIZED ANXIETY DISORDER): ICD-10-CM

## 2023-07-12 DIAGNOSIS — F41.0 PANIC DISORDER: ICD-10-CM

## 2023-07-13 RX ORDER — CLONAZEPAM 0.5 MG/1
TABLET ORAL
Qty: 90 TABLET | Refills: 2 | Status: SHIPPED | OUTPATIENT
Start: 2023-07-13

## 2023-08-03 ENCOUNTER — SOCIAL WORK (OUTPATIENT)
Dept: BEHAVIORAL/MENTAL HEALTH CLINIC | Facility: CLINIC | Age: 33
End: 2023-08-03
Payer: COMMERCIAL

## 2023-08-03 DIAGNOSIS — F41.1 GENERALIZED ANXIETY DISORDER: Primary | ICD-10-CM

## 2023-08-03 PROCEDURE — 90834 PSYTX W PT 45 MINUTES: CPT | Performed by: SOCIAL WORKER

## 2023-08-03 NOTE — PSYCH
Behavioral Health Psychotherapy Progress Note    Psychotherapy Provided: Individual Psychotherapy     1. Generalized anxiety disorder            Goals addressed in session: Goal 1     DATA: Client presented for an in-person session. Client reported that things have been going really well. She mentioned that she joined a fit camp, which she described is an intense workout. She expressed that she and her boyfriend participate together. Client highlighted that she and her boyfriend are going on five months together, and how they have made it official. Client addressed that they have arguments here and there and each time; he has proven that he wants to be with her. Client voiced that they had taken an [facility] trip and how this trip had brought them closer together. Client talked about her son, and how he is still living with his father. She addressed that he was supposed to visit, but due to circumstance; he was no longer able. Client emphasized that she is hosting an early  party and how she is looking forward to the party and seeing everyone. Client spoke about getting new furniture, a new car and focusing on herself. She acknowledged that she is a hard worker and that when she puts her mind towards something; she goes after it. Client highlighted that she also booked a solo trip to [place], [place] in September that she is looking forward to. Client disclosed that she officially took her son's father off of child support and how this reflects how far they have come as co-parents. They are facing some challenges regarding [place] laws and child support. Client talked briefly about a  she attended with her grandmother and how her grandmother was sitting with Erickson Mensah. Clinician actively listened, provided emotional support, validated client's feelings, shared in client's excitement around things that have taken place and encouraged client to continue to utilize coping skills and focusing on herself/needs.    During this session, this clinician used the following therapeutic modalities: Client-centered Therapy and Cognitive Behavioral Therapy    Substance Abuse was not addressed during this session. If the client is diagnosed with a co-occurring substance use disorder, please indicate any changes in the frequency or amount of use: N/A. Stage of change for addressing substance use diagnoses: No substance use/Not applicable    ASSESSMENT:  Ginger Schwab presents with a Euthymic/ normal mood. her affect is Normal range and intensity, which is congruent, with her mood and the content of the session. The client has made progress on their goals. Ginger Schwab presents with a low risk of suicide, low risk of self-harm, and low risk of harm to others. For any risk assessment that surpasses a "low" rating, a safety plan must be developed. A safety plan was indicated: no  If yes, describe in detail N/A    PLAN: Between sessions, Ginger Schwab will continue to utilize coping skills to manage stress/anxiety. At the next session, the therapist will use Client-centered Therapy and Cognitive Behavioral Therapy to address anxiety. Behavioral Health Treatment Plan and Discharge Planning: Ginger Schwab is aware of and agrees to continue to work on their treatment plan. They have identified and are working toward their discharge goals.  yes    Visit start and stop times:    08/03/23  Start Time: 1103  Stop Time: 1153  Total Visit Time: 50 minutes

## 2023-08-07 ENCOUNTER — TELEPHONE (OUTPATIENT)
Dept: GENETICS | Facility: CLINIC | Age: 33
End: 2023-08-07

## 2023-08-07 NOTE — PROGRESS NOTES
Pre-Test Genetic Counseling Consult Note    Patient Name: Ari Yu   /Age: 1990/32 y.o. Referring Provider: Shanta Yanez DO    Date of Service: 2023  Genetic Counselor: Terrell Richter, [address] [facility], [address] [facility]    Genetic Counseling Student: Mayuri Padron, genetic counseling student completed today's appointment under my supervision  Interpretation Services: None  [facility]    Candido Naranjo was referred to the [address] [place] [address],[address] and Genetic Assessment Program due to her family history of breast cancer. she presents today to discuss the possibility of a hereditary cancer syndrome, options for genetic testing, and implications for her and her family. She attended the appointment alone. Cancer History and Treatment:     Personal History: No personal history of cancer    Screening Hx:     Breast:  Breast Imaging: Candido Naranjo has a script for her first mammogram but has not scheduled it yet  Breast Biopsy: None  Breast Density: Unknown    Colon:  Colonoscopy: None    Gynecologic:  Ovaries and Uterus intact     Skin:  No current screening for dermatology, however patient noted that she had a benign keloid removed    Other screening: None    Reproductive History  Age at menarche: 7  Age at first live birth: 26  Menopause: Premenopausal  Hormone replacement: no    Medical and Surgical History  Pertinent surgical history:   Past Surgical History:   Procedure Laterality Date   • NO PAST SURGERIES        Pertinent medical history:  Past Medical History:   Diagnosis Date   • Anxiety    • Depression 10/5/2015       Other History:  Height:   Ht Readings from Last 1 Encounters:   23 5' 6" (1.676 m)     Weight:   Wt Readings from Last 1 Encounters:   23 65.8 kg (145 lb)     Relevant Family History   Patient reports No Ashkenazi Mormon ancestry. Maternal Family History:   Mother: d. 39, unilateral breast dx. 45, underwent bilateral mastectomy  MGM: d. 61, breast dx. Unknown  MGF: d. Unknown, prostate dx. Unknown    Paternal Family History:  PGM: 80, breast dx. early    Please refer to the scanned pedigree in the Media Tab for a complete family history     *All history is reported as provided by the patient; records are not available for review, except where indicated. Assessment:  We discussed sporadic, familial and hereditary cancer. We also discussed the many factors that influence our risk for cancer such as age, environmental exposures, lifestyle choices and family history. We reviewed the indications suggestive of a hereditary predisposition to cancer. Tj Ernst is unaffected, but is considering genetic testing due to a family history of breast cancer. Although Tom's mother is the most informative person to undergo genetic testing, Tj Ernst can consider genetic testing due to the following:   Patient does not have cancer but is the most informative person to test because their mother is . Genetic testing is indicated for Tj Ernst based on the following criteria: Meets NCCN V2.2023 Testing Criteria for High-Penetrance Breast Cancer Susceptibility Genes: Mother dx. Breast cancer under the age of 48. The risks, benefits, and limitations of genetic testing were reviewed with the patient, as well as genetic discrimination laws, and possible test results (positive, negative, variants of uncertain significance) and their clinical implications. If positive for a mutation, options for managing cancer risk including increased surveillance, chemoprevention, and in some cases prophylactic surgery were discussed. Tj Ernst was informed that if a hereditary cancer syndrome was identified in her, first degree relatives (parents, siblings, and children) have a chance of also inheriting the condition. Genetic testing would allow for predictive genetic testing in other relatives, who may also be at risk depending on their degree of relation.      Billing:  Most individuals pay <$100 for hereditary cancer genetic testing. If insurance covers the cost of the testing, individuals may still pay out of pocket secondary to co-pays, co-insurance, or deductibles. If the cost of the testing exceeds $100, the lab will reach out to the patient via phone or e-mail. The patient will then have the option to proceed with the testing, cancel the testing, or elect the self-pay option of $250. Arsen Nino verbalized understanding. Plan: Patient decided to proceed with testing and provided consent. Summary:     Sample Collection:  The patient was given a blood kit and instructed to go to a [facility] lab. Genetic Testing Preformed: [employer] Digital Cancer + RNA Panel (47 genes): APC, VERA, AXIN2, BARD1, BMPR1A, BRCA1, BRCA2, BRIP1, CDH1, CDK4, CDKN2A, CHEK2, CTNNA1, DICER1, EPCAM, GREM1, HOXB13, KIT, MEN1, MLH1, MSH2, MSH3, MSH6, MUTYH, NBN, NF1, NTHL1, PALB2, PDGFRA, PMS2, POLD1, POLE, PTEN, RAD50, RAD51C, RAD51D, SDHA, SDHB, SDHC, SDHD, SMAD4, SMARCA4, STK11, TP53, TSC1, TSC2, VHL    Result Call Information:  In the event that we need to reach Arsen Nino via telephone:  I confirmed the patient's mobile number on file as the best number to call with results  I confirmed with the patient that we can leave a voicemail on the provided numbers    Results take approximately 2-3 weeks to complete once test is started. Arsen Nino will be notified via [employer] once results are available. Additional recommendations for surveillance/medical management will be made pending genetic test results.

## 2023-08-09 ENCOUNTER — CLINICAL SUPPORT (OUTPATIENT)
Dept: GENETICS | Facility: CLINIC | Age: 33
End: 2023-08-09
Payer: COMMERCIAL

## 2023-08-09 ENCOUNTER — DOCUMENTATION (OUTPATIENT)
Dept: GENETICS | Facility: CLINIC | Age: 33
End: 2023-08-09

## 2023-08-09 DIAGNOSIS — Z80.3 FAMILY HISTORY OF BREAST CANCER IN MOTHER: Primary | ICD-10-CM

## 2023-08-09 PROCEDURE — 96040 PR MEDICAL GENETICS COUNSELING EACH 30 MINUTES: CPT | Performed by: GENETIC COUNSELOR, MS

## 2023-08-29 ENCOUNTER — SOCIAL WORK (OUTPATIENT)
Dept: BEHAVIORAL/MENTAL HEALTH CLINIC | Facility: CLINIC | Age: 33
End: 2023-08-29
Payer: COMMERCIAL

## 2023-08-29 DIAGNOSIS — F41.1 GENERALIZED ANXIETY DISORDER: Primary | ICD-10-CM

## 2023-08-29 PROCEDURE — 90834 PSYTX W PT 45 MINUTES: CPT | Performed by: SOCIAL WORKER

## 2023-08-29 NOTE — PSYCH
Behavioral Health Psychotherapy Progress Note    Psychotherapy Provided: Individual Psychotherapy     1. Generalized anxiety disorder            Goals addressed in session: Goal 1     DATA: Client presented for an in-person session. Client reported that she has been working and preparing for her trip to [place], [place]. Client shared that she recently found out that her boyfriend has been sleeping with his ex-girlfriend. She mentioned that she had an intuition that this was taking place and finally called him out on it. She expressed that they had gone to the [employer] Thursday and how a condom had fallen out of his pocket. When client confronted him about it; he denied that it was hi. When they returned back to his place, client noticed a photo of him and his ex-girlfriend. Instead of confronting him about it, client had messaged her on social media, where it was confirmed that he and her were sleeping together. Client found out that this occurred more than once. Client explained that she had kept her composure, but told him how she felt about it. She addressed that he claims that he had made a mistake and does not want to be with his ex. He noted that he was "sorry." Client voiced that he needs to prove through his actions rather than words and that she will not go out of her way to talk to him or see him. Client plans to still go to [place], with or without him. Client made it very clear that she is still going to do her, and that she does not need a man for support. She did express that she may give him a second chance, depending on what he does moving forward. Clinician actively listened, provided emotional support, validated client's feelings, explored coping skills, commended her for keeping her composure and still wanting to pursue her trip regardless of what took place.    During this session, this clinician used the following therapeutic modalities: Client-centered Therapy and Cognitive Behavioral Therapy    Substance Abuse was not addressed during this session. If the client is diagnosed with a co-occurring substance use disorder, please indicate any changes in the frequency or amount of use: N/A. Stage of change for addressing substance use diagnoses: No substance use/Not applicable    ASSESSMENT:  Stephanie Moran presents with a Euthymic/ normal mood. her affect is Normal range and intensity, which is congruent, with her mood and the content of the session. The client has made progress on their goals. Stephanie Moran presents with a low risk of suicide, low risk of self-harm, and low risk of harm to others. For any risk assessment that surpasses a "low" rating, a safety plan must be developed. A safety plan was indicated: no  If yes, describe in detail N/A    PLAN: Between sessions, Stephanie Moran will continue to utilize coping skills to manage stress/anxiety. At the next session, the therapist will use Client-centered Therapy and Cognitive Behavioral Therapy to address anxiety. Behavioral Health Treatment Plan and Discharge Planning: Stephanie Moran is aware of and agrees to continue to work on their treatment plan. They have identified and are working toward their discharge goals.  yes    Visit start and stop times:    08/29/23  Start Time: 1105  Stop Time: 1155  Total Visit Time: 50 minutes

## 2023-09-06 ENCOUNTER — OFFICE VISIT (OUTPATIENT)
Dept: DERMATOLOGY | Facility: CLINIC | Age: 33
End: 2023-09-06
Payer: COMMERCIAL

## 2023-09-06 VITALS — BODY MASS INDEX: 26.84 KG/M2 | HEIGHT: 66 IN | WEIGHT: 167 LBS

## 2023-09-06 DIAGNOSIS — L91.0 KELOID: Primary | ICD-10-CM

## 2023-09-06 PROCEDURE — 99203 OFFICE O/P NEW LOW 30 MIN: CPT | Performed by: DERMATOLOGY

## 2023-09-06 PROCEDURE — 11900 INJECT SKIN LESIONS </W 7: CPT | Performed by: DERMATOLOGY

## 2023-09-06 NOTE — PATIENT INSTRUCTIONS
1. KELOID SCAR    Physical Exam:  Anatomic Location Affected:  Mid chest    Assessment and Plan:  Based on a thorough discussion of this condition and the management approach to it (including a comprehensive discussion of the known risks, side effects and potential benefits of treatment), the patient (family) agrees to implement the following specific plan:  Kenalog injection today    A keloid scar is a firm, smooth, hard growth due to spontaneous scar formation. It can arise soon after an injury, or develop months later. Keloids may be uncomfortable or itchy and extend well beyond the original wound. They may form on any part of the body, although the upper chest and shoulders are especially prone to them. The precise reason that wound healing sometimes leads to keloid formation is under investigation but is not yet clear. While most people never form keloids, others develop them after minor injuries, burns, insect bites and acne spots. Dark skinned people form keloids more easily than Caucasians. A keloid is harmless to general health and does not change into skin cancer. The following measures are helpful in at least some patients. Emollients (creams and oils)  Polyurethane or silicone scar reduction patches  Silicone gel  Oral or topical tranilast (an inhibitor of collagen synthesis)  Pressure dressings  Surgical excision (but in keloids, excision may result in a new keloid even larger than the original one)  Intralesional corticosteroid injection, repeated every few weeks  Intralesional 5-fluorouracil  Cryotherapy  Superficial X-ray treatment soon after surgery. Pulsed dye laser   Skin needling  Subcision    Scar dressings should be worn for 12-24 hours per day, for at least 8 to 12 weeks, and perhaps for much longer. PROCEDURE:  INTRALESIONAL STEROID INJECTION (KENALOG INJECTION)    Purpose: Triamcinolone is a synthetic glucocorticoid corticosteroid that has marked anti-inflammatory action.  It is prepared in sterile aqueous suspension suitable for injecting directly into a lesion on or immediately below the skin to treat a dermal inflammatory process. Indications: It is indicated for alopecia areata; inflammatory acne cysts; discoid lupus erythematosus; keloids and hypertrophic scars; inflammatory lesions of granuloma annulare, lichen planus, lichen simplex chronicus (neurodermatitis), psoriatic plaques, and other localized inflammatory skin conditions. Potential Side Effects: I understand that triamcinolone injection can potentially cause early and/or delayed adverse effects such as:    Pain    Impaired wound healing    Increased hair growth    Bleeding    White or brown marks    Steroid acne    Infection    Telangiectasia    Skin thinning    Cutaneous and subcutaneous lipoatrophy (most common) appearing as skin indentations or dimples around the injection sites a few weeks after treatment       There was less than 1 mL of blood loss and little to no discomfort. The area was bandaged with a Band-aid. The patient tolerated the procedure well and remained in the office for observation. With no signs of an adverse reaction, the patient was eventually discharged from clinic.

## 2023-09-25 ENCOUNTER — SOCIAL WORK (OUTPATIENT)
Dept: BEHAVIORAL/MENTAL HEALTH CLINIC | Facility: CLINIC | Age: 33
End: 2023-09-25
Payer: COMMERCIAL

## 2023-09-25 DIAGNOSIS — F41.1 GENERALIZED ANXIETY DISORDER: Primary | ICD-10-CM

## 2023-09-25 PROCEDURE — 90834 PSYTX W PT 45 MINUTES: CPT | Performed by: SOCIAL WORKER

## 2023-09-25 NOTE — PSYCH
Behavioral Health Psychotherapy Progress Note    Psychotherapy Provided: Individual Psychotherapy     1. Generalized anxiety disorder            Goals addressed in session: Goal 1     DATA: Client presented for an in-person session. Client reported that she had to take a trip to [place] over the weekend, stating that one of his teacher's called concerned. She mentioned that she had gone through his phone and found that he had been asking people for money, hanging out with the wrong crowd and smoking weed. Client expressed that she does not condone this behavior and confronted him. Client's son began to lie regarding his behaviors. Client spoke to someone who's child attends a [employer]. Client would like to get her son connected and had already started to process. Client talked about her boyfriend and how they are both open to couples counseling. Client noted that his ex has been causing issues within their relationship, which is making it difficult for her to move on. Client addressed having trust issues and trauma and how she wants it to work between them, however, she fears that his ex is going to jeopardize their relationship. Client noted that they had gone to [place], [place] for a few days and how much fun they had. Client expressed that she can see herself with him, which is why she is willing to do whatever it takes to make it work. Client would also like to see him make more of an effort to prove to her that he wants to be with her. Client highlighted that her birthday is next week and how she is planning a party. Clinician actively listened, provided emotional support, validated client's feelings, addressed her concerns, discussed the benefits of couples counseling and individual counseling, explored coping skills.    During this session, this clinician used the following therapeutic modalities: Client-centered Therapy and Cognitive Behavioral Therapy    Substance Abuse was not addressed during this session. If the client is diagnosed with a co-occurring substance use disorder, please indicate any changes in the frequency or amount of use: N/A. Stage of change for addressing substance use diagnoses: No substance use/Not applicable    ASSESSMENT:  Jerrica Bob presents with a Euthymic/ normal mood. her affect is Normal range and intensity, which is congruent, with her mood and the content of the session. The client has made progress on their goals. Jerrica Bob presents with a low risk of suicide, low risk of self-harm, and low risk of harm to others. For any risk assessment that surpasses a "low" rating, a safety plan must be developed. A safety plan was indicated: no  If yes, describe in detail N/A    PLAN: Between sessions, Jerrica Bob will continue to utilize coping skills to manage stress/anxiety. At the next session, the therapist will use Client-centered Therapy and Cognitive Behavioral Therapy to address anxiety. Behavioral Health Treatment Plan and Discharge Planning: Jerrica Bob is aware of and agrees to continue to work on their treatment plan. They have identified and are working toward their discharge goals.  yes    Visit start and stop times:    09/25/23  Start Time: 5393  Stop Time: 1300  Total Visit Time: 49 minutes

## 2023-10-19 ENCOUNTER — SOCIAL WORK (OUTPATIENT)
Dept: BEHAVIORAL/MENTAL HEALTH CLINIC | Facility: CLINIC | Age: 33
End: 2023-10-19
Payer: COMMERCIAL

## 2023-10-19 DIAGNOSIS — F41.1 GENERALIZED ANXIETY DISORDER: Primary | ICD-10-CM

## 2023-10-19 PROCEDURE — 90834 PSYTX W PT 45 MINUTES: CPT | Performed by: SOCIAL WORKER

## 2023-10-19 NOTE — PSYCH
Behavioral Health Psychotherapy Progress Note    Psychotherapy Provided: Individual Psychotherapy     1. Generalized anxiety disorder            Goals addressed in session: Goal 1     DATA: Client presented for an in-person session. Client reported that things have been going well overall. Client shared that she and her boyfriend started attending couples counseling and had their first session. They have another session next week. She mentioned that he has been showing her that he does not want to lose her and is doing what he needs to, to keep her. Client expressed that he confronted his ex and she is no longer in the picture. Client noted that she met his parents and they hit it off rather quickly. Client informed clinician that she had found out she was pregnant, was eager to share with his family and friends, but later found out that she had miscarried on her birthday. Client voiced feeling completely heartbroken and devastated, but acknowledged that they are actively trying. She was encouraged by their friends and family that they had also experienced miscarriages, but then later had successful pregnancies. Client noted that he is planning to move into her place come January. Although, she addressed that he had already started to move some of his stuff into her place. Client disclosed that she stopped working at [employer], stating that she is financially stable to take a break for a bit to focus on herself. She emphasized that the application is in the works for her son to start [employer] school and how this is the last straw for him. Client shared that he needs discipline and how he is not getting it at his dad's. Client briefly talked about her sister's and how the one contacted her on Facebook. Client spoke in-length around their strained relationship and how they will never be able to mend their relationship. Client emphasized that she loves them from a distance and that is about it.  Client made it clear that she is happier without them and how she keeps firm boundaries with them. Clinician actively listened, provided emotional support, validated client's feelings, processed current events, was encouraged to hear that she and her boyfriend were seeking couples counseling and explored coping skills. During this session, this clinician used the following therapeutic modalities: Client-centered Therapy, Cognitive Behavioral Therapy, and Supportive Psychotherapy    Substance Abuse was not addressed during this session. If the client is diagnosed with a co-occurring substance use disorder, please indicate any changes in the frequency or amount of use: N/A. Stage of change for addressing substance use diagnoses: No substance use/Not applicable    ASSESSMENT:  Venus Mcgee presents with a Euthymic/ normal mood. her affect is Normal range and intensity, which is congruent, with her mood and the content of the session. The client has made progress on their goals. Venus Mcgee presents with a low risk of suicide, low risk of self-harm, and low risk of harm to others. For any risk assessment that surpasses a "low" rating, a safety plan must be developed. A safety plan was indicated: no  If yes, describe in detail N/A    PLAN: Between sessions, Venus Mcgee will continue to utilize coping skills to manage stress/anxiety. At the next session, the therapist will use Client-centered Therapy, Cognitive Behavioral Therapy, and Supportive Psychotherapy to address anxiety. Behavioral Health Treatment Plan and Discharge Planning: Venus Mcgee is aware of and agrees to continue to work on their treatment plan. They have identified and are working toward their discharge goals.  yes    Visit start and stop times:    10/19/23  Start Time: 1504  Stop Time: 4111  Total Visit Time: 51 minutes

## 2023-10-23 NOTE — PROGRESS NOTES
History of Present Illness   Well Adult Physical   Patient here for a comprehensive physical exam.    HPI    Diet and Physical Activity  Diet: {diet; well adult:04903}  Weight concerns: {weight concerns; well adult:17324}  Exercise: {exericse; well adult:53315}   EtOH: {rare/occasional/daily/social/none:96054::"***":1}     Depression Screen  PHQ-2/9 Depression Screening              General Health  Hearing: {WELL ADULT MCAVBXZ:09852}  Vision: {vision; well adult:75757}  Dental: {dental; well adult:58090}     History:  LMP: No LMP recorded. Menopause at *** years  : ***  Para: ***    Cancer Screening  Colononoscopy ***  Mammogram ***   Pap ***  Abnormal pap? {yes/no:9010}  Smoker *** Annual screening with low-dose helical computed tomography (CT) for patients age 54 to 76 years with history of smoking at least 30 pack-years and, if a former smoker, had quit within the previous 15 years         The following portions of the patient's history were reviewed and updated as appropriate: allergies, current medications, past family history, past medical history, past social history, past surgical history and problem list.    Review of Systems     Review of Systems   Constitutional: Negative. Negative for activity change, appetite change, chills, fatigue and fever. HENT: Negative. Negative for congestion, ear pain, postnasal drip and sinus pain. Eyes: Negative. Respiratory: Negative. Negative for cough and shortness of breath. Cardiovascular: Negative. Negative for chest pain and leg swelling. Gastrointestinal: Negative. Negative for constipation and diarrhea. Endocrine: Negative. Genitourinary: Negative. Negative for dysuria. Musculoskeletal: Negative. Skin: Negative. Allergic/Immunologic: Negative. Negative for immunocompromised state. Neurological: Negative. Negative for dizziness and light-headedness. Hematological: Negative. Psychiatric/Behavioral: Negative. Past Medical History     Past Medical History:   Diagnosis Date    Anxiety     Depression 10/5/2015       Past Surgical History     Past Surgical History:   Procedure Laterality Date    NO PAST SURGERIES         Social History     Social History     Socioeconomic History    Marital status: Single     Spouse name: Not on file    Number of children: Not on file    Years of education: Not on file    Highest education level: Not on file   Occupational History    Not on file   Tobacco Use    Smoking status: Never    Smokeless tobacco: Never   Vaping Use    Vaping Use: Never used   Substance and Sexual Activity    Alcohol use: No    Drug use: No    Sexual activity: Yes     Birth control/protection: Injection     Comment: unprotected sexual intercourse   Other Topics Concern    Not on file   Social History Narrative    Dental care    Lives indep    Living situation safe    No advance directives     Social Determinants of Health     Financial Resource Strain: Not on file   Food Insecurity: Not on file   Transportation Needs: No Transportation Needs (8/21/2020)    PRAPARE - Transportation     Lack of Transportation (Medical): No     Lack of Transportation (Non-Medical): No   Physical Activity: Sufficiently Active (8/21/2020)    Exercise Vital Sign     Days of Exercise per Week: 3 days     Minutes of Exercise per Session: 60 min   Stress: Stress Concern Present (8/21/2020)    [address] [place] [address]     Feeling of Stress :  To some extent   Social Connections: Not on file   Intimate Partner Violence: Not on file   Housing Stability: Not on file       Family History     Family History   Problem Relation Age of Onset    Anxiety disorder Mother     Depression Mother     Breast cancer Mother     Mental illness Mother     Substance Abuse Mother     Hypertension Mother     Hypertension Father         essential    Mental illness Father     Heart attack Father     Substance Abuse Father        Current Medications       Current Outpatient Medications:     albuterol (PROVENTIL HFA,VENTOLIN HFA) 90 mcg/act inhaler, inhale 2 puffs by mouth and INTO THE LUNGS every 6 hours if needed for cough - use for 5 days (Patient not taking: Reported on 9/6/2023), Disp: , Rfl:     clonazePAM (KlonoPIN) 0.5 mg tablet, take 1 tablet by mouth up to three times a day if needed for anxiety, Disp: 90 tablet, Rfl: 2    cloNIDine (CATAPRES) 0.2 mg tablet, Take 1 tablet (0.2 mg total) by mouth daily at bedtime, Disp: 90 tablet, Rfl: 1    montelukast (SINGULAIR) 10 mg tablet, Take 1 tablet (10 mg total) by mouth daily at bedtime, Disp: 90 tablet, Rfl: 0     Allergies     Allergies   Allergen Reactions    Citalopram Nausea Only     Annotation - [id number]: nausea    Codeine Sulfate Tremor    Flonase [Fluticasone] Facial Swelling    Zoloft [Sertraline] Abdominal Pain       Objective     There were no vitals taken for this visit. Wt Readings from Last 3 Encounters:   09/06/23 75.8 kg (167 lb)   04/18/23 65.8 kg (145 lb)   03/21/23 63.5 kg (140 lb)     BP Readings from Last 3 Encounters:   04/18/23 100/78   03/21/23 110/68   01/11/23 134/86     Pulse Readings from Last 3 Encounters:   03/21/23 68   01/11/23 68   12/05/22 76     There is no height or weight on file to calculate BMI. Physical Exam  Vitals and nursing note reviewed. Constitutional:       Appearance: Normal appearance. HENT:      Head: Normocephalic and atraumatic. Right Ear: Tympanic membrane, ear canal and external ear normal.      Left Ear: Tympanic membrane, ear canal and external ear normal.      Nose: Nose normal.      Mouth/Throat:      Mouth: Mucous membranes are moist.      Pharynx: Oropharynx is clear. Eyes:      Extraocular Movements: Extraocular movements intact. Conjunctiva/sclera: Conjunctivae normal.      Pupils: Pupils are equal, round, and reactive to light.    Cardiovascular:      Rate and Rhythm: Normal rate and regular rhythm. Pulses: Normal pulses. Heart sounds: Normal heart sounds. Pulmonary:      Effort: Pulmonary effort is normal.      Breath sounds: Normal breath sounds. Abdominal:      General: Bowel sounds are normal.      Palpations: Abdomen is soft. Musculoskeletal:         General: Normal range of motion. Cervical back: Normal range of motion and neck supple. Skin:     General: Skin is warm and dry. Neurological:      General: No focal deficit present. Mental Status: She is alert and oriented to person, place, and time. Psychiatric:         Mood and Affect: Mood normal.         Behavior: Behavior normal.         Thought Content: Thought content normal.         Judgment: Judgment normal.           No results found.     Health Maintenance     Health Maintenance   Topic Date Due    Hepatitis C Screening  Never done    COVID-19 Vaccine (1) Never done    HIV Screening  Never done    Annual Physical  Never done    Cervical Cancer Screening  03/23/2021    BMI: Followup Plan  06/22/2021    Depression Remission PHQ  06/05/2023    PT PLAN OF CARE  07/29/2023    Influenza Vaccine (1) Never done    BMI: Adult  09/06/2024    DTaP,Tdap,and Td Vaccines (2 - Td or Tdap) 10/07/2029    Pneumococcal Vaccine: Pediatrics (0 to 5 Years) and At-Risk Patients (6 to 59 Years)  Aged Out    HIB Vaccine  Aged Out    IPV Vaccine  Aged Out    Hepatitis A Vaccine  Aged Out    Meningococcal ACWY Vaccine  Aged Out    HPV Vaccine  Aged [employer] History   Administered Date(s) Administered    Hep B, Adolescent or Pediatric 03/26/2009    MMR 03/26/2009    Tdap 10/07/2019    Tuberculin Skin Test-PPD Intradermal 05/18/2016, 06/06/2016, 11/07/2018       Laboratory Results:   Lab Results   Component Value Date    WBC 6.0 11/29/2022    WBC 4.9 01/14/2016    HGB 12.0 11/29/2022    HGB 12.7 01/14/2016    HCT 37.3 11/29/2022    HCT 40.1 01/14/2016    MCV 86 11/29/2022    MCV 85 01/14/2016     11/29/2022  01/14/2016     Lab Results   Component Value Date    BUN 11 11/29/2022    BUN 10 01/14/2016     Lab Results   Component Value Date    GLUC 89 11/29/2022    ALT 11 11/29/2022    ALT 15 01/14/2016    AST 23 11/29/2022    AST 30 01/14/2016     Lab Results   Component Value Date    TSH 1.670 11/29/2022     No results found for: "A1C"    Lipid Profile:   Lab Results   Component Value Date    CHOL 193 01/14/2016     Lab Results   Component Value Date    HDL 48 11/29/2022    HDL 56 01/14/2016     No results found for: "LDLC"  Lab Results   Component Value Date    LDLCALC 128 (H) 11/29/2022    LDLCALC 127 (H) 01/14/2016     Lab Results   Component Value Date    TRIG 77 11/29/2022    TRIG 49 01/14/2016         Assessment/Plan     1. Healthy female exam.  2. Patient Counseling:   {LK Adult CPE Counseling List:84999}    Immunizations reviewed. ***  Discussed benefits of screening ***. Discussed the patient's BMI with her. The BMI {BMI plan ( NQF measure 421):64452}  3. Cancer Screening ***  4. Labs ***  5. ***  6. Follow up {follow-up interval:22616}.     REGGIE Hernandez

## 2023-10-24 ENCOUNTER — OFFICE VISIT (OUTPATIENT)
Dept: FAMILY MEDICINE CLINIC | Facility: HOSPITAL | Age: 33
End: 2023-10-24
Payer: COMMERCIAL

## 2023-10-24 VITALS
DIASTOLIC BLOOD PRESSURE: 66 MMHG | WEIGHT: 172 LBS | HEART RATE: 80 BPM | SYSTOLIC BLOOD PRESSURE: 100 MMHG | BODY MASS INDEX: 27.76 KG/M2

## 2023-10-24 DIAGNOSIS — F51.01 PRIMARY INSOMNIA: ICD-10-CM

## 2023-10-24 DIAGNOSIS — F41.1 GAD (GENERALIZED ANXIETY DISORDER): ICD-10-CM

## 2023-10-24 DIAGNOSIS — F41.0 PANIC DISORDER: Primary | ICD-10-CM

## 2023-10-24 DIAGNOSIS — Z78.9 TRYING TO GET PREGNANT: ICD-10-CM

## 2023-10-24 PROBLEM — N91.2 AMENORRHEA: Status: RESOLVED | Noted: 2017-06-22 | Resolved: 2023-10-24

## 2023-10-24 PROCEDURE — 99214 OFFICE O/P EST MOD 30 MIN: CPT | Performed by: NURSE PRACTITIONER

## 2023-10-24 RX ORDER — VALACYCLOVIR HYDROCHLORIDE 1 G/1
1000 TABLET, FILM COATED ORAL DAILY
COMMUNITY
Start: 2023-08-17

## 2023-10-24 RX ORDER — CLONAZEPAM 0.5 MG/1
TABLET ORAL
Qty: 90 TABLET | Refills: 2 | Status: SHIPPED | OUTPATIENT
Start: 2023-10-24

## 2023-10-24 NOTE — PROGRESS NOTES
[id number] Shazia PAREDES    Assessment/Plan:      Diagnosis ICD-10-CM Associated Orders   1. Panic disorder  F41.0 Ambulatory referral to Psych Services     clonazePAM (KlonoPIN) 0.5 mg tablet      2. Primary insomnia  F51.01       3. Trying to get pregnant  Z78.9 Ambulatory referral to [address]      4. NEHEMIAS (generalized anxiety disorder)  F41.1 clonazePAM (KlonoPIN) 0.5 mg tablet        Referral to psychiatry to further explore anxiety medication options while pregnant  Refilled klonopin today with goal of balancing anxiety. Obtain GYN PAP records, last PAP  normal  Return in 4 weeks (on 2023) for Annual physical.  Patient may call or return to office with any questions or concerns. ______________________________________________________________________  Subjective:     Patient ID: Venus Mcgee is a 29 y.o. female. Venus Mcgee  Chief Complaint   Patient presents with    Follow-up       Here to discuss anxiety and trying to conceive. Recent miscarriage last month, she is wondering if her current anxiety medications contributed? Condolences provided:    1. Clonazepam dosing adjusted in 2023:  Prior dosinmg at HS, 0.5mg in the morning with 0.5 mg BID prn. Current dosin.5mg TID prn. Taking once daily prn because trying to conceive but feeling more anxious    2. Clonidine:  Prior dosin.2mg daily at night for anxiety  Stopped taking this a month ago. Notes dizziness, myalgia, fatigue but improving. 1.  14yr old son recently moved in with his father out of state as he was giving Andrena Karuk trouble. This has also decreased her current stressors. 4.  She recently changed her work schedule so no longer working evenings/nights. Improvement in circadian rhythm.       Has had A/R to sertraline and Celexa in the past, reluctant to restart       Recent  bloodwork 10/13/23 through [facility] (not in epic currently):  TSH 1.46    10/5/23 CBC/CMP normal. The following portions of the patient's history were reviewed and updated as appropriate: allergies, current medications, past family history, past medical history, past social history, past surgical history, and problem list.    Review of Systems   Constitutional: Negative. Negative for activity change, appetite change, chills, fatigue and fever. HENT: Negative. Negative for congestion, ear pain, postnasal drip and sinus pain. Eyes: Negative. Respiratory: Negative. Negative for cough and shortness of breath. Cardiovascular: Negative. Negative for chest pain and leg swelling. Gastrointestinal: Negative. Negative for constipation and diarrhea. Endocrine: Negative. Genitourinary: Negative. Negative for dysuria. Musculoskeletal: Negative. Skin: Negative. Allergic/Immunologic: Negative. Negative for immunocompromised state. Neurological: Negative. Negative for dizziness and light-headedness. Hematological: Negative. Psychiatric/Behavioral:  The patient is nervous/anxious. Objective:      Vitals:    10/24/23 1000   BP: 100/66   Pulse: 80      Physical Exam  Vitals and nursing note reviewed. Constitutional:       Appearance: Normal appearance. HENT:      Head: Normocephalic and atraumatic. Right Ear: Tympanic membrane, ear canal and external ear normal.      Left Ear: Tympanic membrane, ear canal and external ear normal.      Nose: Nose normal.      Mouth/Throat:      Mouth: Mucous membranes are moist.      Pharynx: Oropharynx is clear. Eyes:      Extraocular Movements: Extraocular movements intact. Conjunctiva/sclera: Conjunctivae normal.      Pupils: Pupils are equal, round, and reactive to light. Cardiovascular:      Rate and Rhythm: Normal rate and regular rhythm. Pulses: Normal pulses. Heart sounds: Normal heart sounds. Pulmonary:      Effort: Pulmonary effort is normal.      Breath sounds: Normal breath sounds. Abdominal:      General: Bowel sounds are normal.      Palpations: Abdomen is soft. Musculoskeletal:         General: Normal range of motion. Cervical back: Normal range of motion and neck supple. Skin:     General: Skin is warm and dry. Neurological:      General: No focal deficit present. Mental Status: She is alert and oriented to person, place, and time. Psychiatric:         Mood and Affect: Mood is anxious. Speech: Speech is rapid and pressured. Behavior: Behavior normal. Behavior is cooperative. Thought Content: Thought content normal.         Judgment: Judgment normal.      Comments: Speech calms within duration of OV. Portions of the record may have been created with voice recognition software. Occasional wrong word or "sound alike" substitutions may have occurred due to the inherent limitations of voice recognition software. Please review the chart carefully and recognize, using context, where substitutions/typographical errors may have occurred.

## 2023-11-02 ENCOUNTER — SOCIAL WORK (OUTPATIENT)
Dept: BEHAVIORAL/MENTAL HEALTH CLINIC | Facility: CLINIC | Age: 33
End: 2023-11-02
Payer: COMMERCIAL

## 2023-11-02 DIAGNOSIS — F43.10 POST TRAUMATIC STRESS DISORDER (PTSD): ICD-10-CM

## 2023-11-02 DIAGNOSIS — F41.1 GENERALIZED ANXIETY DISORDER: Primary | ICD-10-CM

## 2023-11-02 PROCEDURE — 90834 PSYTX W PT 45 MINUTES: CPT | Performed by: SOCIAL WORKER

## 2023-11-02 NOTE — PSYCH
Behavioral Health Psychotherapy Progress Note    Psychotherapy Provided: Individual Psychotherapy     1. Generalized anxiety disorder        2. Post traumatic stress disorder (PTSD)            Goals addressed in session: Goal 1     DATA: Client presented for an in-person session. Client reported feeling frustrated, stating that she has to travel to [place] tomorrow to pickup her son. She shared that they have an open house to attend to for Demar Perez. She voiced that he continues to act out and be disrespectful towards authority. Client continued to express how fed up and unsure what else to do if Demar Perez does not get through to him. Client mentioned that she has connected him to services, in hopes that he would open up and address his concerns and address why he is acting out. However, client noted that he does not utilize the services appropriately. Client also talked about being frustrated with her boyfriend and how she feels the relationship is one sided. She disclosed how she has paid for him, takes him to work and showers him with [employer] and in return; she gets nothing back. She has shared this with him multiple times and there has been no changes. Client told him that he has until Thanksgiving to make changes or she is ending things. She wants to feel valued and loved and currently she feels used and under appreciated. Client disclosed that they were attending couples counseling but ended services due to having to pay out of pocket and feeling the counselor they were seeing was being sneaky. She disclosed what brought her to decision, stating that he was charging her for things without communicating it to her and scheduling appointments without confirming with them. Client spoke in-length around needing to continue to focus on herself and establishing boundaries with people. Client refuses to allow others to dictate her life.  Clinician actively listened, provided emotional support, validated client's feelings, addressed and processed client's frustrations, encouraged client to continue to utilize and make firm boundaries with people and explored coping skills. During this session, this clinician used the following therapeutic modalities: Client-centered Therapy, Cognitive Behavioral Therapy, and Supportive Psychotherapy    Substance Abuse was not addressed during this session. If the client is diagnosed with a co-occurring substance use disorder, please indicate any changes in the frequency or amount of use: N/A. Stage of change for addressing substance use diagnoses: No substance use/Not applicable    ASSESSMENT:  Magui Moore presents with a Euthymic/ normal mood. her affect is Normal range and intensity, which is congruent, with her mood and the content of the session. The client has made progress on their goals. Magui Moore presents with a low risk of suicide, low risk of self-harm, and low risk of harm to others. For any risk assessment that surpasses a "low" rating, a safety plan must be developed. A safety plan was indicated: no  If yes, describe in detail N/A    PLAN: Between sessions, Magui Moore will continue to utilize coping skills to manage stress/anxiety. Continue to create and keep firm boundaries. At the next session, the therapist will use Client-centered Therapy, Cognitive Behavioral Therapy, and Supportive Psychotherapy to address anxiety. Behavioral Health Treatment Plan and Discharge Planning: Magui Moore is aware of and agrees to continue to work on their treatment plan. They have identified and are working toward their discharge goals.  yes    Visit start and stop times:    11/02/23  Start Time: 1609  Stop Time: 1700  Total Visit Time: 51 minutes

## 2023-11-13 ENCOUNTER — TELEPHONE (OUTPATIENT)
Dept: FAMILY MEDICINE CLINIC | Facility: HOSPITAL | Age: 33
End: 2023-11-13

## 2023-11-13 NOTE — TELEPHONE ENCOUNTER
Patient called to let David Negron know that her apartment complex will be faxing over paperwork to be filled out. She also stated that [facility] cannot get her in for almost a year. She is wondering if there is someone else that David Negron would recommend for her to see for her anxiety and depression?

## 2023-11-15 NOTE — TELEPHONE ENCOUNTER
Called PT - Dr. Ruchi Casper will fill out her form for her - with Rosalee Suhemigdio being out of the office - also gave her 2 places she could check out for her anxiety and depression - [address] [place] [address] in [facility] - and [facility] - both would be virtual.   Also told her she could call the [phone] # on the back of her insurance card - and they would let her know which one her insurance would cover.

## 2023-12-19 ENCOUNTER — TELEPHONE (OUTPATIENT)
Dept: FAMILY MEDICINE CLINIC | Facility: HOSPITAL | Age: 33
End: 2023-12-19

## 2023-12-19 NOTE — TELEPHONE ENCOUNTER
Pt taking Klonopin but is now 5 weeks pregnant. Explains she had gone through withdrawal in the past trying to stop this medication. Advised by OB/GYN to seek PCP advice

## 2024-01-03 ENCOUNTER — TELEPHONE (OUTPATIENT)
Dept: PSYCHIATRY | Facility: CLINIC | Age: 34
End: 2024-01-03

## 2024-01-03 NOTE — PROGRESS NOTES
"[facility]   Lindsay PAREDES    Assessment/Plan:      Diagnosis ICD-10-CM Associated Orders   1. First trimester pregnancy  Z34.91       2. NEHEMIAS (generalized anxiety disorder)  F41.1       3. Moderate episode of recurrent major depressive disorder (HCC)  F33.1           Call  psychiatry to establish care.    No follow-ups on file.  Patient may call or return to office with any questions or concerns.     ______________________________________________________________________  Subjective:     Patient ID: Tom Choi is a 33 y.o. female.  Tom Choi  Chief Complaint   Patient presents with   • Follow-up     Discuss med change       Here for follow up.  First trimester pregnancy confirmed by US 12/28/23, followed by [facility]BG[facility] Dr. Oneill but will be transferring services to OBGYN at [place].    Taking clonazepam 0.5mg once a week however notes when she doesn't take it she feels \"withdrawal\" described as ill feeling with fatigue and moments of anxiety.    Referral to physiatry placed in October 2023.  Epic message to Dr. Michael Rodriguez who is working on getting her referral expedited.  Ms. Choi did get a call from  who reports they can schedule her.  Encouraged to call back ASAP.  Hx sertraline/Celexa failure due to GI upset which is typically transient.  Tom would like to hold off on SSRI and discuss with psychiatry.  Persistent anxiety but manageable at this time.               The following portions of the patient's history were reviewed and updated as appropriate: allergies, current medications, past family history, past medical history, past social history, past surgical history, and problem list.    Review of Systems   Constitutional:  Positive for fatigue. Negative for activity change, appetite change, chills and fever.   HENT: Negative.  Negative for congestion, ear pain, postnasal drip and sinus pain.    Eyes: Negative.    Respiratory: Negative.  Negative for cough and shortness of breath. "    Cardiovascular: Negative.  Negative for chest pain and leg swelling.   Gastrointestinal: Negative.  Negative for constipation and diarrhea.   Endocrine: Negative.    Genitourinary: Negative.  Negative for dysuria.   Musculoskeletal: Negative.    Skin: Negative.    Allergic/Immunologic: Negative.  Negative for immunocompromised state.   Neurological: Negative.  Negative for dizziness and light-headedness.   Hematological: Negative.    Psychiatric/Behavioral:  The patient is nervous/anxious.          Objective:      Vitals:    01/04/24 0927   BP: 94/72   Pulse: 84   SpO2: 99%      Physical Exam  Vitals and nursing note reviewed.   Constitutional:       Appearance: Normal appearance.   HENT:      Head: Normocephalic and atraumatic.      Right Ear: Tympanic membrane, ear canal and external ear normal.      Left Ear: Tympanic membrane, ear canal and external ear normal.      Nose: Nose normal.      Mouth/Throat:      Mouth: Mucous membranes are moist.      Pharynx: Oropharynx is clear.   Eyes:      Extraocular Movements: Extraocular movements intact.      Conjunctiva/sclera: Conjunctivae normal.      Pupils: Pupils are equal, round, and reactive to light.   Cardiovascular:      Rate and Rhythm: Normal rate and regular rhythm.      Pulses: Normal pulses.      Heart sounds: Normal heart sounds.   Pulmonary:      Effort: Pulmonary effort is normal.      Breath sounds: Normal breath sounds.   Abdominal:      General: Bowel sounds are normal.      Palpations: Abdomen is soft.   Musculoskeletal:         General: Normal range of motion.      Cervical back: Normal range of motion and neck supple.   Skin:     General: Skin is warm and dry.   Neurological:      General: No focal deficit present.      Mental Status: She is alert and oriented to person, place, and time.   Psychiatric:         Mood and Affect: Mood normal.         Speech: Speech normal.         Behavior: Behavior normal. Behavior is cooperative.         Thought  "Content: Thought content normal.         Judgment: Judgment normal.      Comments: Mood stable at today's OV         Portions of the record may have been created with voice recognition software. Occasional wrong word or \"sound alike\" substitutions may have occurred due to the inherent limitations of voice recognition software. Please review the chart carefully and recognize, using context, where substitutions/typographical errors may have occurred.     "

## 2024-01-04 ENCOUNTER — OFFICE VISIT (OUTPATIENT)
Dept: FAMILY MEDICINE CLINIC | Facility: HOSPITAL | Age: 34
End: 2024-01-04
Payer: COMMERCIAL

## 2024-01-04 VITALS — DIASTOLIC BLOOD PRESSURE: 72 MMHG | SYSTOLIC BLOOD PRESSURE: 94 MMHG | OXYGEN SATURATION: 99 % | HEART RATE: 84 BPM

## 2024-01-04 DIAGNOSIS — F33.1 MODERATE EPISODE OF RECURRENT MAJOR DEPRESSIVE DISORDER (HCC): ICD-10-CM

## 2024-01-04 DIAGNOSIS — F41.1 GAD (GENERALIZED ANXIETY DISORDER): ICD-10-CM

## 2024-01-04 DIAGNOSIS — Z34.91 FIRST TRIMESTER PREGNANCY: Primary | ICD-10-CM

## 2024-01-04 PROCEDURE — 99213 OFFICE O/P EST LOW 20 MIN: CPT | Performed by: NURSE PRACTITIONER

## 2024-01-04 RX ORDER — AMOXICILLIN 500 MG/1
500 CAPSULE ORAL 3 TIMES DAILY
COMMUNITY
Start: 2023-12-27

## 2024-01-08 NOTE — BH TREATMENT PLAN
"TREATMENT PLAN (Medication Management Only)        [facility] - PSYCHIATRIC [facility]OCI[facility]    Name and Date of Birth:  Tom Choi 33 y.o. 1990  Date of Treatment Plan: January 8, 2024  Diagnosis/Diagnoses:  No diagnosis found.  Strengths/Personal Resources for Self-Care: {AMB PATIENT STRENGTHS:56006}.  Area/Areas of need (in own words): {AMB PATIENT AREAS OF NEED:37657}  1. Long Term Goal: {AMB LONG TERM GOALS:92841}.  Target Date:{AMB TREATMENT PLAN TARGET DATE:34824}  Person/Persons responsible for completion of goal: {AMB Person TT Plan:43518}  2.  Short Term Objective (s) - How will we reach this goal?:   A. Provider new recommended medication/dosage changes and/or continue medication(s): {AMB SHORT TERM OBJECTIVE MEDS:80759}.  B. {AMB SHORT TERM OTHER OBJECTIVES:11154::\"N/A\"}.  C. {AMB SHORT TERM OTHER OBJECTIVES:68336::\"N/A\"}.  Target Date:{AMB TREATMENT PLAN TARGET DATE:34824}  Person/Persons Responsible for Completion of Goal: {AMB Person TT Plan:38313}  Progress Towards Goals: {AMB Progress Towards Goals TT Plan:74451}  Treatment Modality: {AMB TREATMENT MODALITY:67008}  Review due 180 days from date of this plan: {AMB TREATMENT PLAN REVIEW DUE:98464}  Expected length of service: {AMB LOS:52656}  My Physician/PA/NP and I have developed this plan together and I agree to work on the goals and objectives. I understand the treatment goals that were developed for my treatment.      "
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
## 2024-01-08 NOTE — BH CRISIS PLAN
"Client Name: Tom Choi       Client YOB: 1990  : 1990    Treatment Team (include name and contact information):     Psychotherapist: N/A    Psychiatrist[facility] Lock[phone][facility]   Release of information completed: no    : N/A   Release of information completed: no    Other (Specify Role): N/A    Release of information completed: no    Other (Specify Role): N/A   Release of information completed: no    Healthcare Provider  Laurie Sutton DO  [address] [place] [id number] [phone]    Type of Plan   * Child plans (children 12 yo and younger) must be completed and signed by the child's legal guardian   * Plans for all individuals 15 yo and above must be signed by the client.     Plan Type: adolescent/adult (14 and over) Initial      My Personal Strengths are (in the client's own words):  \"***\"    The stressors and triggers that may put me at risk are:  {AMB PSYCH/BH Triggers/Stressors:41932}    Coping skills I can use to keep myself calm and safe:  {AMB PSYCH/BH COPING SKILLS:24995}    Coping skills/supports I can use to maintain abstinence from substance use:   {Substance [place]}    The people that provide me with help and support: (Include name, contact, and how they can help)   Support person #1: ***    * Phone number: {Support Person Phone:30868}    * How can they help me? ***   Support person #2:***    * Phone number: {Support Person Phone:10917}    * How can they help me? ***     Support person #3: ***    * Phone number: {Support Person Phone:76999}    * How can they help me? ***    In the past, the following has helped me in times of crisis:    {AMB PSYCH/BH Things that help:74828}      If it is an emergency and you need immediate help, call     If there is a possibility of danger to yourself or others, call the following crisis hotline resources:     Adult Crisis Numbers  Suicide Prevention Hotline - " Dial 9-8-8  [facility]: [phone]  [facility]: [phone]  [facility]: [phone]  [facility]: [phone]  [facility]: [phone]  [facility]: [phone]  [facility]: [phone]  [facility]: [phone] (daytime).       [phone] (after hours, weekends, holidays)     Child/Adolescent Crisis Numbers   [facility]: [phone]   [facility]: [phone]   [place]: [phone]   [facility]: [phone]    Please note: Some [facility] do not have a separate number for Child/Adolescent specific crisis. If your county is not listed under Child/Adolescent, please call the adult number for your county     National Talk to Text Line   All Ages - [phone]    In the event your feelings become unmanageable, and you cannot reach your support system, you will call [phone] immediately or go to the nearest hospital emergency room.

## 2024-01-09 ENCOUNTER — OFFICE VISIT (OUTPATIENT)
Dept: PSYCHIATRY | Facility: CLINIC | Age: 34
End: 2024-01-09
Payer: COMMERCIAL

## 2024-01-09 DIAGNOSIS — F41.1 GAD (GENERALIZED ANXIETY DISORDER): ICD-10-CM

## 2024-01-09 DIAGNOSIS — F33.1 MODERATE EPISODE OF RECURRENT MAJOR DEPRESSIVE DISORDER (HCC): Primary | ICD-10-CM

## 2024-01-09 PROCEDURE — 90792 PSYCH DIAG EVAL W/MED SRVCS: CPT

## 2024-01-09 NOTE — BH CRISIS PLAN
Client Name: Tom Choi       Client YOB: 1990    [facility]du[facility] Safety Plan         Step 1: Warning Signs:            Step 2: Internal Coping Strategies:            Step 3: People and social settings that provide distraction:            Step 4: People whom I can ask for help during a crisis:      Step 5: Professionals or agencies I can contact during a crisis:        Crisis Phone Numbers:   Suicide Prevention Lifeline: Call or Text  145 Crisis Text Line: Text HOME to 832-171   Please note: Some [facility] do not have a separate number for Child/Adolescent specific crisis. If your county is not listed under Child/Adolescent, please call the adult number for your county      Adult Crisis Numbers: Child/Adolescent Crisis Numbers   [facility]: [phone] [facility]: [phone]   [facility]: [phone] [facility]: [phone]   [facility]: [phone] [place]: [phone]   [facility]: [phone] [facility]: [phone]   [facility]: [phone]   [facility]: [phone]   [facility]: [phone]   [facility]: [phone] (daytime) [phone] (after hours, weekends, holidays)      Step 6: Making the environment safer (plan for lethal means safety):      Optional: What is most important to me and worth living for?      [employer] Safety Plan. Maricarmen Bergeron and Rashi Preston. Used with permission of the authors.

## 2024-01-09 NOTE — BH TREATMENT PLAN
TREATMENT PLAN (Medication Management Only)        [facility] - [facility]CHI[facility]    Name and Date of Birth:  Tom Choi 33 y.o. 1990  Date of Treatment Plan: January 9, 2024  Diagnosis/Diagnoses:    1. Moderate episode of recurrent major depressive disorder (HCC)    2. NEHEMIAS (generalized anxiety disorder)      Strengths/Personal Resources for Self-Care: supportive family, taking medications as prescribed.  Area/Areas of need (in own words): anxiety  1. Long Term Goal: continue improvement in mood.  Target Date:6 months - 7/9/2024  Person/Persons responsible for completion of goal: Tom  2.  Short Term Objective (s) - How will we reach this goal?:   A. Provider new recommended medication/dosage changes and/or continue medication(s):  taper off of Klonopin due to pregnancy .  B. Attend medication management appointments regularly.  C. N/A.  Target Date:6 months - 7/9/2024  Person/Persons Responsible for Completion of Goal: Tom  Progress Towards Goals: starting treatment  Treatment Modality: medication management every 2 months  Review due 180 days from date of this plan: 6 months - 7/9/2024  Expected length of service: ongoing treatment  My Physician/PA/NP and I have developed this plan together and I agree to work on the goals and objectives. I understand the treatment goals that were developed for my treatment.

## 2024-02-12 ENCOUNTER — SOCIAL WORK (OUTPATIENT)
Dept: BEHAVIORAL/MENTAL HEALTH CLINIC | Facility: CLINIC | Age: 34
End: 2024-02-12
Payer: COMMERCIAL

## 2024-02-12 DIAGNOSIS — F41.1 GENERALIZED ANXIETY DISORDER: Primary | ICD-10-CM

## 2024-02-12 PROCEDURE — 90834 PSYTX W PT 45 MINUTES: CPT | Performed by: SOCIAL WORKER

## 2024-02-12 NOTE — PSYCH
"Behavioral Health Psychotherapy Progress Note    Psychotherapy Provided: Individual Psychotherapy     1. Generalized anxiety disorder            Goals addressed in session: Goal 1     DATA: Client presented for an in-person session. Client reported that her son was back for a very short period of time, before returning back to his father's. Client expressed that he continues to act out and feel entitled. Client noted that she has tried to get him connected to different supports, teach him lessons, provide options to better help him but keeps coming back to being disrespectful. Client voiced that he is currently staying with his grandmother, with whom she does not get along with. Client has not heard from him in two weeks. She is hoping that his grandmother will \"talk some sense into him\" and provide discipline. In the meantime, client highlighted that she is 12-weeks pregnant with twins. She emphasized how this had come as a shock. She addressed how it has taken her a bit of time to accept that she is pregnant with twins. She shared that twins runs on her boyfriend's side of the family and how he has really stepped up. She expressed that they are doing really well together and how they are both very excited. Client emphasized that he is the baby in his family, so his whole family are excited for the babies. She shared that they have a gender reveal party early March. She is due in August and has the baby shower planned for July. Client noted that the first trimester caused her to feel very fatigued and low energy. She is just beginning to regain energy. Client spoke briefly around talking to her sister again, but keeping their contact limited. She also shared that she weaned off the Klonopin, which she wanted to do while pregnant. She meets with her psychiatrist next week. Clinician actively listened, provided emotional support, validated client's feelings, addressed and processed current events, shared in client's " "excitement towards her pregnancy, encouraged client to maintain boundaries and reflected on coping skills.   During this session, this clinician used the following therapeutic modalities: Client-centered Therapy, Cognitive Behavioral Therapy, and Supportive Psychotherapy    Substance Abuse was not addressed during this session. If the client is diagnosed with a co-occurring substance use disorder, please indicate any changes in the frequency or amount of use: N/A. Stage of change for addressing substance use diagnoses: No substance use/Not applicable    ASSESSMENT:  Tom Choi presents with a Euthymic/ normal mood.     her affect is Normal range and intensity, which is congruent, with her mood and the content of the session. The client has made progress on their goals.     Tom Choi presents with a low risk of suicide, low risk of self-harm, and low risk of harm to others.    For any risk assessment that surpasses a \"low\" rating, a safety plan must be developed.    A safety plan was indicated: no  If yes, describe in detail N/A    PLAN: Between sessions, Tom Choi will continue to utilize coping skills to manage stress/anxiety. Continue to maintain boundaries. At the next session, the therapist will use Client-centered Therapy, Cognitive Behavioral Therapy, and Supportive Psychotherapy to address anxiety.    Behavioral Health Treatment Plan and Discharge Planning: Tom Choi is aware of and agrees to continue to work on their treatment plan. They have identified and are working toward their discharge goals. yes    Visit start and stop times:    02/12/24  Start Time: 1200  Stop Time: 1248  Total Visit Time: 48 minutes  "
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
## 2024-02-19 ENCOUNTER — OFFICE VISIT (OUTPATIENT)
Dept: PSYCHIATRY | Facility: CLINIC | Age: 34
End: 2024-02-19
Payer: COMMERCIAL

## 2024-02-19 DIAGNOSIS — F41.1 GAD (GENERALIZED ANXIETY DISORDER): Primary | ICD-10-CM

## 2024-02-19 DIAGNOSIS — F33.1 MODERATE EPISODE OF RECURRENT MAJOR DEPRESSIVE DISORDER (HCC): ICD-10-CM

## 2024-02-19 PROBLEM — O30.031 MONOCHORIONIC DIAMNIOTIC TWIN GESTATION IN FIRST TRIMESTER: Status: ACTIVE | Noted: 2024-01-29

## 2024-02-19 PROBLEM — O09.891 MEDICATION EXPOSURE DURING FIRST TRIMESTER OF PREGNANCY: Status: ACTIVE | Noted: 2024-01-03

## 2024-02-19 PROBLEM — D25.1 INTRAMURAL LEIOMYOMA OF UTERUS: Status: ACTIVE | Noted: 2024-01-29

## 2024-02-19 PROBLEM — Z34.81 PRENATAL CARE, SUBSEQUENT PREGNANCY, FIRST TRIMESTER: Status: ACTIVE | Noted: 2024-01-03

## 2024-02-19 PROBLEM — O30.009 TWIN PREGNANCY, ANTEPARTUM: Status: ACTIVE | Noted: 2024-01-29

## 2024-02-19 PROBLEM — A60.9 HSV (HERPES SIMPLEX VIRUS) ANOGENITAL INFECTION: Status: ACTIVE | Noted: 2024-01-03

## 2024-02-19 PROCEDURE — 99214 OFFICE O/P EST MOD 30 MIN: CPT

## 2024-02-19 RX ORDER — FENUGREEK SEED/BL.THISTLE/ANIS 340 MG
CAPSULE ORAL
COMMUNITY

## 2024-03-04 ENCOUNTER — SOCIAL WORK (OUTPATIENT)
Dept: BEHAVIORAL/MENTAL HEALTH CLINIC | Facility: CLINIC | Age: 34
End: 2024-03-04
Payer: COMMERCIAL

## 2024-03-04 DIAGNOSIS — F41.1 GENERALIZED ANXIETY DISORDER: Primary | ICD-10-CM

## 2024-03-04 PROCEDURE — 90834 PSYTX W PT 45 MINUTES: CPT | Performed by: SOCIAL WORKER

## 2024-03-04 NOTE — PSYCH
Behavioral Health Psychotherapy Progress Note    Psychotherapy Provided: Individual Psychotherapy     1. Generalized anxiety disorder            Goals addressed in session: Goal 1     DATA: Client presented for an in-person session. Client reported that she is in her second trimester of pregnancy. She highlighted that she is finally getting her energy back. She expressed that she and her boyfriend and the gender reveal party over the weekend and how she is having two boys. Client voiced excitement around this, stating that she wanted a boy. Client shared that the family made them believe that they were having girls, stating that they cut through a cake and popped confetti guns. However, this was all just a joke. Client is now in the midst of coming up with boy names. Client spoke briefly around her son's grandmother and how she has been trying to come after her regarding her son. Client addressed that she has full legal custody of her son and how she has the final say in what takes place. Client noted that she has been maintaining firm boundaries with her family and that she has no time to be involved in other's drama. Client recently returned from a trip to [place] and how the trip had gone pretty well. Clinician actively listened, provided emotional support, validated client's feelings, addressed and processed current events, encouraged client to continue to take it easy and attend appointments.  During this session, this clinician used the following therapeutic modalities: Client-centered Therapy, Cognitive Behavioral Therapy, and Supportive Psychotherapy    Substance Abuse was not addressed during this session. If the client is diagnosed with a co-occurring substance use disorder, please indicate any changes in the frequency or amount of use: N/A. Stage of change for addressing substance use diagnoses: No substance use/Not applicable    ASSESSMENT:  Tom Choi presents with a Euthymic/ normal mood.      "her affect is Normal range and intensity, which is congruent, with her mood and the content of the session. The client has made progress on their goals.     Tom Choi presents with a low risk of suicide, low risk of self-harm, and low risk of harm to others.    For any risk assessment that surpasses a \"low\" rating, a safety plan must be developed.    A safety plan was indicated: no  If yes, describe in detail N/A    PLAN: Between sessions, Tom Choi will continue to utilize coping skills to manage stress/anxiety. At the next session, the therapist will use Client-centered Therapy, Cognitive Behavioral Therapy, and Supportive Psychotherapy to address anxiety.    Behavioral Health Treatment Plan and Discharge Planning: Tom Choi is aware of and agrees to continue to work on their treatment plan. They have identified and are working toward their discharge goals. yes    Visit start and stop times:    03/04/24  Start Time: 1206  Stop Time: 1246  Total Visit Time: 40 minutes  "

## 2024-04-01 ENCOUNTER — SOCIAL WORK (OUTPATIENT)
Dept: BEHAVIORAL/MENTAL HEALTH CLINIC | Facility: CLINIC | Age: 34
End: 2024-04-01
Payer: COMMERCIAL

## 2024-04-01 DIAGNOSIS — F41.1 GENERALIZED ANXIETY DISORDER: Primary | ICD-10-CM

## 2024-04-01 PROCEDURE — 90834 PSYTX W PT 45 MINUTES: CPT | Performed by: SOCIAL WORKER
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
## 2024-04-17 ENCOUNTER — OFFICE VISIT (OUTPATIENT)
Dept: PSYCHIATRY | Facility: CLINIC | Age: 34
End: 2024-04-17
Payer: COMMERCIAL

## 2024-04-17 DIAGNOSIS — F33.1 MODERATE EPISODE OF RECURRENT MAJOR DEPRESSIVE DISORDER (HCC): Primary | ICD-10-CM

## 2024-04-17 DIAGNOSIS — F41.1 GAD (GENERALIZED ANXIETY DISORDER): ICD-10-CM

## 2024-04-17 DIAGNOSIS — F41.0 PANIC DISORDER: ICD-10-CM

## 2024-04-17 PROCEDURE — 99214 OFFICE O/P EST MOD 30 MIN: CPT

## 2024-04-17 RX ORDER — PNV,CALCIUM 72/IRON/FOLIC ACID 27 MG-1 MG
1 TABLET ORAL DAILY
COMMUNITY
Start: 2024-03-15

## 2024-04-17 RX ORDER — ASPIRIN 81 MG/1
81 TABLET, CHEWABLE ORAL DAILY
COMMUNITY
Start: 2024-03-15 | End: 2024-12-10

## 2024-04-29 ENCOUNTER — SOCIAL WORK (OUTPATIENT)
Dept: BEHAVIORAL/MENTAL HEALTH CLINIC | Facility: CLINIC | Age: 34
End: 2024-04-29
Payer: COMMERCIAL

## 2024-04-29 DIAGNOSIS — F41.1 GENERALIZED ANXIETY DISORDER: Primary | ICD-10-CM

## 2024-04-29 PROCEDURE — 90834 PSYTX W PT 45 MINUTES: CPT | Performed by: SOCIAL WORKER

## 2024-04-29 NOTE — PSYCH
"Behavioral Health Psychotherapy Progress Note    Psychotherapy Provided: Individual Psychotherapy     1. Generalized anxiety disorder            Goals addressed in session: Goal 1     DATA: Client presented for an in-person session. Client reported that she was six months pregnant. She had an appointment with her OBGYN, who voiced that they would like to deliver the twins at 36-weeks (considered full term). Client addressed that they moved the delivery for sometime in July. Client talked about needing to attend biweekly appointments (to check the progress of the babies), stating that the babies share one placenta (high risk pregnancy). Client mentioned that she is in twin groups on Facebook that discuss such topics that bring her mind at ease. Client briefly shared that she and her boyfriend have been having some difficulties with communication, but not much else. They have already started putting together the nursery. Client spoke in-length around her son and his grandmother (paternal grandmother) and how she is accusing her of \"abandoning\" her son. Client talked about his son's grandmother trying to send her to court and how she has all these documents and kept a trail of what supports she tried to get her son connected with. Client voiced that her son has been having behavioral issues and attempted to get him the necessary support. However, he did not respond to anything she had done. Client had sent him to live with his dad in [place]. Client shared that she does not have the time or energy to be dealing with him and her. Clinician actively listened, provided emotional support, validated client's feelings, addressed and processed current events, discussed how she knows the truth and what she has done to support her son, keep a record of what she had done and utilize coping skills.  During this session, this clinician used the following therapeutic modalities: Client-centered Therapy, Cognitive Behavioral " "Therapy, and Supportive Psychotherapy    Substance Abuse was not addressed during this session. If the client is diagnosed with a co-occurring substance use disorder, please indicate any changes in the frequency or amount of use: N/A. Stage of change for addressing substance use diagnoses: No substance use/Not applicable    ASSESSMENT:  Tom Choi presents with a Euthymic/ normal mood.     her affect is Normal range and intensity, which is congruent, with her mood and the content of the session. The client has made progress on their goals.     Tom Choi presents with a low risk of suicide, low risk of self-harm, and low risk of harm to others.    For any risk assessment that surpasses a \"low\" rating, a safety plan must be developed.    A safety plan was indicated: no  If yes, describe in detail N/A    PLAN: Between sessions, Tom Choi will continue to utilize coping skills to manage stress/anxiety. At the next session, the therapist will use Client-centered Therapy, Cognitive Behavioral Therapy, and Supportive Psychotherapy to address anxiety.    Behavioral Health Treatment Plan and Discharge Planning: Tom Choi is aware of and agrees to continue to work on their treatment plan. They have identified and are working toward their discharge goals. yes    Visit start and stop times:    04/29/24  Start Time: 1203  Stop Time: 1248  Total Visit Time: 45 minutes  "

## 2024-05-06 ENCOUNTER — TELEPHONE (OUTPATIENT)
Age: 34
End: 2024-05-06

## 2024-05-06 NOTE — TELEPHONE ENCOUNTER
Pt called stating our office was sent a document in December from her apartment complex [facility] in [place] [facility] [place] for permission for pt to have an emotional support animal (dog). I was unable to find documentation in pt chart to reference. Pt would like a call back when this document is sent to the  or with any further questions please [phone]    Document questions can be directed to   Eliza-phone [phone]. Thank you.

## 2024-05-17 ENCOUNTER — TELEPHONE (OUTPATIENT)
Dept: PSYCHIATRY | Facility: CLINIC | Age: 34
End: 2024-05-17
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
## 2024-06-27 ENCOUNTER — TELEMEDICINE (OUTPATIENT)
Dept: PSYCHIATRY | Facility: CLINIC | Age: 34
End: 2024-06-27
Payer: COMMERCIAL

## 2024-06-27 DIAGNOSIS — F33.1 MODERATE EPISODE OF RECURRENT MAJOR DEPRESSIVE DISORDER (HCC): ICD-10-CM

## 2024-06-27 DIAGNOSIS — F41.1 GAD (GENERALIZED ANXIETY DISORDER): Primary | ICD-10-CM

## 2024-06-27 PROBLEM — O09.93 HIGH-RISK PREGNANCY, THIRD TRIMESTER: Chronic | Status: ACTIVE | Noted: 2024-01-03

## 2024-06-27 PROBLEM — O99.019 ANEMIA IN PREGNANCY: Status: ACTIVE | Noted: 2024-06-04

## 2024-06-27 PROCEDURE — 99212 OFFICE O/P EST SF 10 MIN: CPT
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
## 2024-08-29 ENCOUNTER — TELEMEDICINE (OUTPATIENT)
Dept: PSYCHIATRY | Facility: CLINIC | Age: 34
End: 2024-08-29
Payer: COMMERCIAL

## 2024-08-29 DIAGNOSIS — F41.1 GAD (GENERALIZED ANXIETY DISORDER): ICD-10-CM

## 2024-08-29 DIAGNOSIS — F33.1 MODERATE EPISODE OF RECURRENT MAJOR DEPRESSIVE DISORDER (HCC): Primary | ICD-10-CM

## 2024-08-29 DIAGNOSIS — F41.0 PANIC DISORDER: ICD-10-CM

## 2024-08-29 PROCEDURE — 99213 OFFICE O/P EST LOW 20 MIN: CPT

## 2024-08-29 RX ORDER — ESCITALOPRAM OXALATE 10 MG/1
10 TABLET ORAL DAILY
Qty: 30 TABLET | Refills: 2 | Status: SHIPPED | OUTPATIENT
Start: 2024-08-29

## 2024-08-29 RX ORDER — ESCITALOPRAM OXALATE 5 MG/1
5 TABLET ORAL DAILY
Qty: 7 TABLET | Refills: 0 | Status: SHIPPED | OUTPATIENT
Start: 2024-08-29

## 2024-08-29 RX ORDER — CLONAZEPAM 0.5 MG/1
0.5 TABLET ORAL DAILY PRN
Qty: 30 TABLET | Refills: 0 | Status: SHIPPED | OUTPATIENT
Start: 2024-08-29

## 2024-08-29 NOTE — BH TREATMENT PLAN
TREATMENT PLAN (Medication Management Only)        [facility]    Name and Date of Birth:  Tom Choi 33 y.o. 1990  Date of Treatment Plan: August 29, 2024  Diagnosis/Diagnoses:    1. Moderate episode of recurrent major depressive disorder (HCC)    2. NEHEMIAS (generalized anxiety disorder)    3. Panic disorder      Strengths/Personal Resources for Self-Care: supportive family, taking medications as prescribed.  Area/Areas of need (in own words): anxiety, depression  1. Long Term Goal: improve mood, decrease anxiety   Target Date:6 months - 2/28/2025  Person/Persons responsible for completion of goal: Tom  2.  Short Term Objective (s) - How will we reach this goal?:   A. Provider new recommended medication/dosage changes and/or continue medication(s): continue current medications as prescribed.  B. Attend medication management appointments regularly.  C. N/A.  Target Date:6 months - 2/28/2025  Person/Persons Responsible for Completion of Goal: Tom  Progress Towards Goals: continuing treatment  Treatment Modality: medication management every 1 months  Review due 180 days from date of this plan: 6 months - 2/28/2025  Expected length of service: ongoing treatment  My Physician/PA/NP and I have developed this plan together and I agree to work on the goals and objectives. I understand the treatment goals that were developed for my treatment.

## 2024-09-27 ENCOUNTER — TELEPHONE (OUTPATIENT)
Dept: PSYCHIATRY | Facility: CLINIC | Age: 34
End: 2024-09-27

## 2024-12-11 ENCOUNTER — TELEPHONE (OUTPATIENT)
Age: 34
End: 2024-12-11

## 2024-12-11 NOTE — TELEPHONE ENCOUNTER
Patient called in looking to sent up follow up appointments with their talk therapist and medication management provider.    Patient stated they just gave birth to twins and are having severe trouble with post partum depression and are in badly in need to talk to someone.    Patient stated they are home with the twins all day and their father works all day so it is very difficult to get them in and out to go anywhere.  Patient stated they would need virtual appointments so they are able to keep up with their care consistently.    Patient states that their provider in the past has said that their insurance does not cover bvrtual appointments.    Patient wanted to make sure that virtual appointments were available before scheduling any follow ups but would like a call back to discuss and then schedule if available.    The office number for [facility] was given as well.
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
## 2025-01-07 ENCOUNTER — TELEMEDICINE (OUTPATIENT)
Dept: PSYCHIATRY | Facility: CLINIC | Age: 35
End: 2025-01-07
Payer: COMMERCIAL

## 2025-01-07 DIAGNOSIS — F33.1 MODERATE EPISODE OF RECURRENT MAJOR DEPRESSIVE DISORDER (HCC): Primary | ICD-10-CM

## 2025-01-07 DIAGNOSIS — F41.1 GAD (GENERALIZED ANXIETY DISORDER): ICD-10-CM

## 2025-01-07 PROCEDURE — 99214 OFFICE O/P EST MOD 30 MIN: CPT

## 2025-01-07 RX ORDER — MEDROXYPROGESTERONE ACETATE 150 MG/ML
INJECTION, SUSPENSION INTRAMUSCULAR
COMMUNITY

## 2025-01-07 RX ORDER — ESCITALOPRAM OXALATE 5 MG/1
5 TABLET ORAL DAILY
Qty: 30 TABLET | Refills: 1 | Status: SHIPPED | OUTPATIENT
Start: 2025-01-07

## 2025-01-07 RX ORDER — CLONAZEPAM 0.5 MG/1
0.5 TABLET ORAL DAILY PRN
Qty: 30 TABLET | Refills: 0 | Status: SHIPPED | OUTPATIENT
Start: 2025-01-07

## 2025-01-07 NOTE — BH CRISIS PLAN
Client Name: Tom Choi       Client YOB: 1990    [facility] Safety Plan      Creation Date: 1/7/25 Update Date: 1/7/26   Created By: Shena Lockhart PA-C Last Updated By: Shena Lockhart PA-C      Step 1: Warning Signs:   Warning Signs   increased anxiety attacks   Negative ruminating thoughts            Step 2: Internal Coping Strategies:   Internal Coping Strategies   listening to music   cry and let emotions out            Step 3: People and social settings that provide distraction:   Name Contact Information   Friend (Annalee) number in cell phone   Cousin (April) number in cell phone            Step 4: People whom I can ask for help during a crisis:      Name Contact Information    Friend (Annalee) number in cell phone    Cousin (April) number in cell phone      Step 5: Professionals or agencies I can contact during a crisis:      Clinican/Agency Name Phone Emergency Contact    Shena Lockhart PA-C ([facility]) [phone]       Local Emergency Department Emergency Department Phone Emergency Department Address    911          Crisis Phone Numbers:   Suicide Prevention Lifeline: Call or Text  422 Crisis Text Line: Text HOME to 622-454   Please note: Some [facility] do not have a separate number for Child/Adolescent specific crisis. If your county is not listed under Child/Adolescent, please call the adult number for your county      Adult Crisis Numbers: Child/Adolescent Crisis Numbers   [facility]: [phone] [facility]: [phone]   [facility]: [phone] [facility]: [phone]   [facility]: [phone] [place]: [phone]   [facility]: [phone] [place]: [phone]   [place] [facility]: [phone]   [facility]: [phone]   [facility]: [phone]   [facility]: [phone] (daytime) [phone] (after hours, weekends, holidays)      Step 6: Making the environment  safer (plan for lethal means safety):   Patient did not identify any lethal methods: Yes     Optional: What is most important to me and worth living for?      [employer] Safety Plan. Maricarmen Bergeron and Rashi Preston. Used with permission of the authors.

## 2025-01-07 NOTE — ASSESSMENT & PLAN NOTE
Variable   Start Lexapro 5 mg daily to help with anxiety and depressive concerns   Recommended continuation of outpatient therapy at [facility], patient will need to schedule an appointment     Orders:    escitalopram (LEXAPRO) 5 mg tablet; Take 1 tablet (5 mg total) by mouth daily

## 2025-01-07 NOTE — ASSESSMENT & PLAN NOTE
Variable   Start Lexapro 5 mg daily to help with anxiety and depressive concerns   Continue Klonopin 0.5 mg daily as needed for anxiety concerns  Recommended continuation of outpatient therapy at [facility], patient will need to schedule an appointment     Orders:    escitalopram (LEXAPRO) 5 mg tablet; Take 1 tablet (5 mg total) by mouth daily    clonazePAM (KlonoPIN) 0.5 mg tablet; Take 1 tablet (0.5 mg total) by mouth daily as needed for anxiety

## 2025-01-21 ENCOUNTER — TELEPHONE (OUTPATIENT)
Age: 35
End: 2025-01-21

## 2025-01-21 NOTE — TELEPHONE ENCOUNTER
Patient calling requesting appts with Rita Arredondo. Patient has not been seen since April 2024. There is a intent to discharge letter but no letter of the patient being discharge was located in the chart.  Please follow up with patient if she can be scheduled with Rita Arredondo.
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
## 2025-02-07 ENCOUNTER — TELEMEDICINE (OUTPATIENT)
Dept: PSYCHIATRY | Facility: CLINIC | Age: 35
End: 2025-02-07
Payer: COMMERCIAL

## 2025-02-07 DIAGNOSIS — F41.1 GAD (GENERALIZED ANXIETY DISORDER): ICD-10-CM

## 2025-02-07 DIAGNOSIS — F33.1 MODERATE EPISODE OF RECURRENT MAJOR DEPRESSIVE DISORDER (HCC): Primary | ICD-10-CM

## 2025-02-07 PROCEDURE — 99214 OFFICE O/P EST MOD 30 MIN: CPT

## 2025-02-07 RX ORDER — CLONAZEPAM 0.5 MG/1
0.5 TABLET ORAL DAILY PRN
Qty: 30 TABLET | Refills: 0 | Status: SHIPPED | OUTPATIENT
Start: 2025-02-07

## 2025-02-07 RX ORDER — ESCITALOPRAM OXALATE 5 MG/1
5 TABLET ORAL DAILY
Qty: 30 TABLET | Refills: 1 | Status: SHIPPED | OUTPATIENT
Start: 2025-02-07

## 2025-02-07 NOTE — ASSESSMENT & PLAN NOTE
Variable   Continue Lexapro 5 mg daily to help with anxiety and depressive concerns   Continue Klonopin 0.5 mg daily as needed for anxiety concerns  Recommended continuation of outpatient therapy at [facility], patient will need to schedule an appointment     Orders:    clonazePAM (KlonoPIN) 0.5 mg tablet; Take 1 tablet (0.5 mg total) by mouth daily as needed for anxiety    escitalopram (LEXAPRO) 5 mg tablet; Take 1 tablet (5 mg total) by mouth daily

## 2025-02-07 NOTE — ASSESSMENT & PLAN NOTE
Variable   Continue Lexapro 5 mg daily to help with anxiety and depressive concerns   Recommended continuation of outpatient therapy at [facility], patient will need to schedule an appointment     Orders:    escitalopram (LEXAPRO) 5 mg tablet; Take 1 tablet (5 mg total) by mouth daily

## 2025-02-11 ENCOUNTER — TELEMEDICINE (OUTPATIENT)
Dept: BEHAVIORAL/MENTAL HEALTH CLINIC | Facility: CLINIC | Age: 35
End: 2025-02-11
Payer: COMMERCIAL

## 2025-02-11 DIAGNOSIS — F41.1 GENERALIZED ANXIETY DISORDER: Primary | ICD-10-CM

## 2025-02-11 PROCEDURE — 90837 PSYTX W PT 60 MINUTES: CPT

## 2025-02-11 NOTE — PSYCH
Virtual Regular Visit    Verification of patient location:    Patient is located at Home in the following state in which I hold an active license [place]      Assessment/Plan:    Problem List Items Addressed This Visit    None  Visit Diagnoses         Generalized anxiety disorder    -  Primary            Goals addressed in session: Goal 1     Depression Follow-up Plan Completed: Not applicable    Reason for visit is   Chief Complaint   Patient presents with    Virtual Regular Visit          Encounter provider Rita Arredondo      Recent Visits  No visits were found meeting these conditions.  Showing recent visits within past 7 days and meeting all other requirements  Today's Visits  Date Type Provider Dept   02/11/25 Telemedicine Rita Arredondo [facility] Therapist [facility]   Showing today's visits and meeting all other requirements  Future Appointments  No visits were found meeting these conditions.  Showing future appointments within next 150 days and meeting all other requirements       The patient was identified by name and date of birth. Tom Choi was informed that this is a telemedicine visit and that the visit is being conducted throughthe Epic Embedded platform. She agrees to proceed..  My office door was closed. No one else was in the room.  She acknowledged consent and understanding of privacy and security of the video platform. The patient has agreed to participate and understands they can discontinue the visit at any time.    Patient is aware this is a billable service.     Subjective  Tom Choi is a 34 y.o. female .      HPI     Past Medical History:   Diagnosis Date    Anxiety     Depression 10/5/2015       Past Surgical History:   Procedure Laterality Date    ACHILLES TENDON SURGERY      NO PAST SURGERIES      WISDOM TOOTH EXTRACTION         Current Outpatient Medications   Medication Sig Dispense Refill    aspirin 81 mg chewable tablet Chew 81 mg daily      clonazePAM (KlonoPIN) 0.5 mg tablet Take 1  "tablet (0.5 mg total) by mouth daily as needed for anxiety 30 tablet 0    escitalopram (LEXAPRO) 5 mg tablet Take 1 tablet (5 mg total) by mouth daily 30 tablet 1    Ferrous Sulfate (IRON PO) Take 1 tablet by mouth daily (Patient not taking: Reported on 2/7/2025)      medroxyPROGESTERone (DEPO-PROVERA) 150 mg/mL injection INJECT 1ML INTO THE SHOULDER, THIGH OR BUTTOCKS EVERY 3 MONTHS      valACYclovir (VALTREX) 1,000 mg tablet Take 1,000 mg by mouth daily       No current facility-administered medications for this visit.        Allergies   Allergen Reactions    Citalopram Nausea Only     Annotation - [id number]: nausea    Codeine Tremor    Codeine Sulfate Tremor    Flonase [Fluticasone] Facial Swelling    Zoloft [Sertraline] Abdominal Pain       Review of Systems    Video Exam    There were no vitals filed for this visit.    Physical Exam     Behavioral Health Psychotherapy Progress Note    Psychotherapy Provided: Individual Psychotherapy     1. Generalized anxiety disorder            Goals addressed in session: Goal 1     DATA: Client presented for a telehealth session via [employer]. Client reported that she was doing well overall. She expressed that she has been adjusting well to twins and how she has been figuring it out herself. She spoke around her twins father and how he is no longer in the picture. She disclosed that she had \"kicked him out\" last week, stating that she kept catching him in his lies. Client talked in-length around this and how he kept lying to her and doing things behind her back. She noted that she found out that he was driving her vehicle with a suspended license, was talking to other girls and attempting to control what she did. Client voiced that it took her some time to figure out what was actually happening and how she was getting the truth from his family. Client acknowledged that it was not easy, but she was able to establish firm but necessary boundaries with him. She addressed " "that they only talk if it relates to their sons, but do not communicate any other time. Client disclosed that she has been through this before and she was not going to allow herself to be fooled. Client emphasized how she continues to take care of her boys, figure things out on her own, acknowledged that she does not need a man to help take care of her or her boys, etc. Clinician actively listened, provided emotional support, validated client's feelings, addressed and processed current events, discussed coping skills, was encouraged that client felt empowered amid her experience with her now ex-boyfriend.   During this session, this clinician used the following therapeutic modalities: Client-centered Therapy and Supportive Psychotherapy    Substance Abuse was not addressed during this session. If the client is diagnosed with a co-occurring substance use disorder, please indicate any changes in the frequency or amount of use: N/A. Stage of change for addressing substance use diagnoses: No substance use/Not applicable    ASSESSMENT:  Tom Choi presents with a Euthymic/ normal mood.     her affect is Normal range and intensity, which is congruent, with her mood and the content of the session. The client has made progress on their goals.     Tom Choi presents with a low risk of suicide, low risk of self-harm, and low risk of harm to others.    For any risk assessment that surpasses a \"low\" rating, a safety plan must be developed.    A safety plan was indicated: no  If yes, describe in detail N/A    PLAN: Between sessions, Tom Choi will continue to utilize coping skills, take medications as prescribed and care for her boys without the need of their father. At the next session, the therapist will use Client-centered Therapy and Supportive Psychotherapy to address anxiety.    Behavioral Health Treatment Plan and Discharge Planning: Tom Choi is aware of and agrees to continue to work on their treatment plan. They " have identified and are working toward their discharge goals. yes    Depression Follow-up Plan Completed: Not applicable    Visit start and stop times:    02/11/25  Start Time: 1300  Stop Time: 1400  Total Visit Time: 60 minutes

## 2025-02-18 ENCOUNTER — TELEMEDICINE (OUTPATIENT)
Dept: BEHAVIORAL/MENTAL HEALTH CLINIC | Facility: CLINIC | Age: 35
End: 2025-02-18
Payer: COMMERCIAL

## 2025-02-18 DIAGNOSIS — F41.1 GENERALIZED ANXIETY DISORDER: Primary | ICD-10-CM

## 2025-02-18 PROCEDURE — 90837 PSYTX W PT 60 MINUTES: CPT

## 2025-02-18 NOTE — PSYCH
Virtual Regular VisitName: Tom Choi      : 1990      MRN: [id number]  Encounter Provider: Rita Arredondo  Encounter Date: 2025   Encounter department: [facility] THERAPIST MENTAL HEALTH OUTPATIENT  [unfilled]  Assessment & Plan  Generalized anxiety disorder               Goals addressed in session: Goal 1     Depression Follow-up Plan Completed: Not applicable    Reason for visit is   Chief Complaint   Patient presents with    Virtual Regular Visit        Recent Visits  Date Type Provider Dept   25 Telemedicine Rita Arredondo [facility] [place] Therapist [facility]   Showing recent visits within past 7 days and meeting all other requirements  Today's Visits  Date Type Provider Dept   25 Telemedicine Rita Arredondo [facility] Therapist [facility]   Showing today's visits and meeting all other requirements  Future Appointments  No visits were found meeting these conditions.  Showing future appointments within next 150 days and meeting all other requirements     [unfilled]  Tom Choi is a 34 y.o. female .  HPI    Past Medical History:   Diagnosis Date    Anxiety     Depression 10/5/2015     Past Surgical History:   Procedure Laterality Date    ACHILLES TENDON SURGERY      NO PAST SURGERIES      WISDOM TOOTH EXTRACTION       Current Outpatient Medications   Medication Instructions    aspirin 81 mg, Daily    clonazePAM (KLONOPIN) 0.5 mg, Oral, Daily PRN    escitalopram (LEXAPRO) 5 mg, Oral, Daily    Ferrous Sulfate (IRON PO) 1 tablet, Daily    medroxyPROGESTERone (DEPO-PROVERA) 150 mg/mL injection INJECT 1ML INTO THE SHOULDER, THIGH OR BUTTOCKS EVERY 3 MONTHS    valACYclovir (VALTREX) 1,000 mg, Daily     Allergies   Allergen Reactions    Citalopram Nausea Only     Annotation - [id number]: nausea    Codeine Tremor    Codeine Sulfate Tremor    Flonase [Fluticasone] Facial Swelling    Zoloft [Sertraline] Abdominal Pain       Review of Systems    Objective   LMP   "(LMP Unknown)     Video Exam  Physical Exam     [unfilled]    Behavioral Health Psychotherapy Progress Note    Psychotherapy Provided: Individual Psychotherapy     1. Generalized anxiety disorder            Goals addressed in session: Goal 1     DATA: Client presented for a telehealth session via [employer]. Client reported that she was doing well overall. She shared that she has been limiting her contact with the twins father, stating that she has set a firm boundary with him. She expressed that ever since she \"kicked him out,\" she has been more at peace. She voiced that he would help every once in a while, but would cause more of a headache than being helpful. Client addressed that she keeps finding out more things about him (that he tried to keep secret) and how she is thankful that she ended things when she did. Client emphasized that she does not need a man to help her, when she can depend on herself. She noted that she would get more done around the house alone than she did having him there. Client disclosed different situations where he did not step in when he should have and how this was a huge eye opener for her. Client highlighted that she is proud of herself for how far she has come and how she continues to handle herself maturely through all of this. Clinician actively listened, provided emotional support, validated client's feelings, addressed and processed current events, discussed the importance of maintaining her boundaries.   During this session, this clinician used the following therapeutic modalities: Client-centered Therapy and Supportive Psychotherapy    Substance Abuse was not addressed during this session. If the client is diagnosed with a co-occurring substance use disorder, please indicate any changes in the frequency or amount of use: N/A. Stage of change for addressing substance use diagnoses: No substance use/Not applicable    ASSESSMENT:  Tom Choi presents with a " "Euthymic/ normal mood.     her affect is Normal range and intensity, which is congruent, with her mood and the content of the session. The client has made progress on their goals.     Tom Choi presents with a low risk of suicide, low risk of self-harm, and low risk of harm to others.    For any risk assessment that surpasses a \"low\" rating, a safety plan must be developed.    A safety plan was indicated: no  If yes, describe in detail N/A    PLAN: Between sessions, Tom Choi will continue to utilize coping skills to manage stress/anxiety and maintain boundaries. At the next session, the therapist will use Client-centered Therapy and Supportive Psychotherapy to address anxiety.    Behavioral Health Treatment Plan and Discharge Planning: Tom Choi is aware of and agrees to continue to work on their treatment plan. They have identified and are working toward their discharge goals. yes    Depression Follow-up Plan Completed: Not applicable    Visit start and stop times:    02/18/25  Start Time: 1300  Stop Time: 1400  Total Visit Time: 60 minutes        "

## 2025-02-27 ENCOUNTER — TELEMEDICINE (OUTPATIENT)
Dept: BEHAVIORAL/MENTAL HEALTH CLINIC | Facility: CLINIC | Age: 35
End: 2025-02-27
Payer: COMMERCIAL

## 2025-02-27 DIAGNOSIS — F41.1 GENERALIZED ANXIETY DISORDER: Primary | ICD-10-CM

## 2025-02-27 PROCEDURE — 90834 PSYTX W PT 45 MINUTES: CPT

## 2025-02-27 NOTE — PSYCH
Virtual Regular Visit    Verification of patient location:    Patient is located at Home in the following state in which I hold an active license [place]      Assessment/Plan:    Problem List Items Addressed This Visit    None  Visit Diagnoses         Generalized anxiety disorder    -  Primary            Goals addressed in session: Goal 1     Depression Follow-up Plan Completed: Yes    Reason for visit is No chief complaint on file.       Encounter provider Rita Arredondo      Recent Visits  No visits were found meeting these conditions.  Showing recent visits within past 7 days and meeting all other requirements  Today's Visits  Date Type Provider Dept   02/27/25 Telemedicine Rita Arredondo [facility] Therapist [facility]   Showing today's visits and meeting all other requirements  Future Appointments  No visits were found meeting these conditions.  Showing future appointments within next 150 days and meeting all other requirements       The patient was identified by name and date of birth. Tom Choi was informed that this is a telemedicine visit and that the visit is being conducted throughthe Epic Embedded platform. She agrees to proceed..  My office door was closed. No one else was in the room.  She acknowledged consent and understanding of privacy and security of the video platform. The patient has agreed to participate and understands they can discontinue the visit at any time.    Patient is aware this is a billable service.     Subjective  Tom Choi is a 34 y.o. female .      HPI     Past Medical History:   Diagnosis Date    Anxiety     Depression 10/5/2015       Past Surgical History:   Procedure Laterality Date    ACHILLES TENDON SURGERY      NO PAST SURGERIES      WISDOM TOOTH EXTRACTION         Current Outpatient Medications   Medication Sig Dispense Refill    aspirin 81 mg chewable tablet Chew 81 mg daily      clonazePAM (KlonoPIN) 0.5 mg tablet Take 1 tablet (0.5 mg total) by mouth daily as needed for  anxiety 30 tablet 0    escitalopram (LEXAPRO) 5 mg tablet Take 1 tablet (5 mg total) by mouth daily 30 tablet 1    Ferrous Sulfate (IRON PO) Take 1 tablet by mouth daily (Patient not taking: Reported on 2/7/2025)      medroxyPROGESTERone (DEPO-PROVERA) 150 mg/mL injection INJECT 1ML INTO THE SHOULDER, THIGH OR BUTTOCKS EVERY 3 MONTHS      valACYclovir (VALTREX) 1,000 mg tablet Take 1,000 mg by mouth daily       No current facility-administered medications for this visit.        Allergies   Allergen Reactions    Citalopram Nausea Only     Annotation - [id number]: nausea    Codeine Tremor    Codeine Sulfate Tremor    Flonase [Fluticasone] Facial Swelling    Zoloft [Sertraline] Abdominal Pain       Review of Systems    Video Exam    There were no vitals filed for this visit.    Physical Exam     Behavioral Health Psychotherapy Progress Note    Psychotherapy Provided: Individual Psychotherapy     1. Generalized anxiety disorder            Goals addressed in session: Goal 1     DATA: Client presented for a telehealth session via [employer]. Client reported ongoing frustrations with her twins father. Client expressed how he continues to show his immaturity and how he tries to manipulate/control situations. She voiced how she had him come over and sleep at her place and how she regrets inviting him in. She noted that she tries to give him the benefit of the doubt, but finds that he takes advantage of her and the situation. She noted that he has apologized on countless occasions, but has yet to prove his apologies. Client acknowledged the importance of putting herself and her boys first and how she is continuing to work on establishing firm boundaries with him. Client addressed that she only wants to have contact with him if it involves the twins. Client is fed up with the lies and childlike behavior he exhibits. Clinician actively listened, provided emotional support, validated client's feelings, addressed and processed  "current events, discussed the importance of maintaining boundaries and only having contact if it involves the kids, look into going back to work part-time.  During this session, this clinician used the following therapeutic modalities: Client-centered Therapy and Supportive Psychotherapy    Substance Abuse was not addressed during this session. If the client is diagnosed with a co-occurring substance use disorder, please indicate any changes in the frequency or amount of use: N/A. Stage of change for addressing substance use diagnoses: No substance use/Not applicable    ASSESSMENT:  Tom Choi presents with a Euthymic/ normal mood.     her affect is Normal range and intensity, which is congruent, with her mood and the content of the session. The client has made progress on their goals.     Tom Choi presents with a low risk of suicide, low risk of self-harm, and low risk of harm to others.    For any risk assessment that surpasses a \"low\" rating, a safety plan must be developed.    A safety plan was indicated: no  If yes, describe in detail N/A    PLAN: Between sessions, Tom Choi will continue to utilize coping skills and establish firm boundaries. At the next session, the therapist will use Client-centered Therapy and Supportive Psychotherapy to address anxiety.    Behavioral Health Treatment Plan and Discharge Planning: Tom Choi is aware of and agrees to continue to work on their treatment plan. They have identified and are working toward their discharge goals. yes    Depression Follow-up Plan Completed: Not applicable    Visit start and stop times:    02/27/25  Start Time: 1300  Stop Time: 1352  Total Visit Time: 52 minutes        "

## 2025-03-06 ENCOUNTER — TELEPHONE (OUTPATIENT)
Age: 35
End: 2025-03-06
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
## 2025-03-13 ENCOUNTER — TELEMEDICINE (OUTPATIENT)
Dept: PSYCHIATRY | Facility: CLINIC | Age: 35
End: 2025-03-13
Payer: COMMERCIAL

## 2025-03-13 ENCOUNTER — TELEMEDICINE (OUTPATIENT)
Dept: BEHAVIORAL/MENTAL HEALTH CLINIC | Facility: CLINIC | Age: 35
End: 2025-03-13
Payer: COMMERCIAL

## 2025-03-13 DIAGNOSIS — F41.1 GENERALIZED ANXIETY DISORDER: Primary | ICD-10-CM

## 2025-03-13 DIAGNOSIS — F41.0 PANIC DISORDER: ICD-10-CM

## 2025-03-13 DIAGNOSIS — F41.1 GAD (GENERALIZED ANXIETY DISORDER): ICD-10-CM

## 2025-03-13 DIAGNOSIS — F33.1 MODERATE EPISODE OF RECURRENT MAJOR DEPRESSIVE DISORDER (HCC): Primary | ICD-10-CM

## 2025-03-13 PROCEDURE — 90837 PSYTX W PT 60 MINUTES: CPT

## 2025-03-13 PROCEDURE — 99214 OFFICE O/P EST MOD 30 MIN: CPT

## 2025-03-13 RX ORDER — CLONAZEPAM 0.5 MG/1
0.5 TABLET ORAL DAILY PRN
Qty: 30 TABLET | Refills: 0 | Status: SHIPPED | OUTPATIENT
Start: 2025-03-13

## 2025-03-13 RX ORDER — ESCITALOPRAM OXALATE 5 MG/1
5 TABLET ORAL DAILY
Qty: 30 TABLET | Refills: 0 | Status: SHIPPED | OUTPATIENT
Start: 2025-03-13

## 2025-03-13 NOTE — BH TREATMENT PLAN
TREATMENT PLAN (Medication Management Only)        [facility]    Name and Date of Birth:  Tom Choi 34 y.o. 1990  MRN: [id number]  Date of Treatment Plan: March 13, 2025  Diagnosis/Diagnoses:    1. Moderate episode of recurrent major depressive disorder (HCC)    2. NEHEMIAS (generalized anxiety disorder)    3. Panic disorder      Strengths/Personal Resources for Self-Care: taking medications as prescribed, ability to adapt to life changes.  Area/Areas of need (in own words): anxiety, depression  1. Long Term Goal:   continue improvement in mood, maintain an acceptable level of anxiety  Target Date:6 months - 9/13/2025  Person/Persons responsible for completion of goal: Tom  2.  Short Term Objective (s) - How will we reach this goal?:   A.  Provider new recommended medication/dosage changes and/or continue medication(s): continue current medications as prescribed.  B.  Attend medication management appointments regularly..  C.  Attend psychotherapy regularly.. (With Rita Arredondo)   Target Date:6 months - 9/13/2025  Person/Persons Responsible for Completion of Goal: Tom  Progress Towards Goals: continuing treatment  Treatment Modality: medication management every 2 months  Review due 180 days from date of this plan: 6 months - 9/13/2025  Expected length of service: ongoing treatment unless revised  My Physician/PA/NP and I have developed this plan together and I agree to work on the goals and objectives. I understand the treatment goals that were developed for my treatment.   Electronic Signatures: on file (unless signed below)    Shena Lockhart PA-C 03/13/25

## 2025-03-13 NOTE — ASSESSMENT & PLAN NOTE
Variable   Continue Lexapro 5 mg daily to help with anxiety and depressive concerns   Recommended continuation of outpatient therapy at [facility]    Orders:    escitalopram (LEXAPRO) 5 mg tablet; Take 1 tablet (5 mg total) by mouth daily

## 2025-03-13 NOTE — PSYCH
"Virtual Regular VisitName: Tom Choi      : 1990      MRN: [id number]  Encounter Provider: Rita Arredondo  Encounter Date: 3/13/2025   Encounter department: [facility] THERAPIST MENTAL HEALTH OUTPATIENT  :  Assessment & Plan  Generalized anxiety disorder             History of Present Illness     HPI  Review of Systems    Objective   There were no vitals taken for this visit.    Physical Exam    Administrative Statements   Encounter provider Rita Arredondo    The Patient is located at Home and in the following state in which I hold an active license [place].    The patient was identified by name and date of birth. Tom Choi was informed that this is a telemedicine visit and that the visit is being conducted through the Epic Embedded platform. She agrees to proceed..  My office door was closed. No one else was in the room.  She acknowledged consent and understanding of privacy and security of the video platform. The patient has agreed to participate and understands they can discontinue the visit at any time.    Behavioral Health Psychotherapy Progress Note    Psychotherapy Provided: Individual Psychotherapy     1. Generalized anxiety disorder            Goals addressed in session: Goal 1     DATA: Client presented for a telehealth session via [employer]. Client reported that the twins are seven months old and how she is already planning their first birthday party. She expressed that she already has a theme picked out and a date for which she would like to host the party. Client spoke in-length around the twin's father and how he continues to be disrespectful towards her, lies, tries to manipulate the situation, lacks communication and unwilling to \"man up.\" She addressed that he attended traffic court the other day and how he had left his paperwork at her place. Client voiced that she saw how many violations he has, outstanding balances and a suspended license. Client emphasized how she does " "not want someone who cannot take care of themselves or prioritize appropriately to be around her and the children. Client talked briefly around her oldest son an dhow she has ongoing issues with the grandmother. She mentioned the grandmother is trying to send her to court for custody and accusing her of abandoning her son. Client has spoken to a  who is helping with her case. Client expressed that she has been working on focusing on herself and her twin boys. She voiced that she plans to go back to work at some point and doing what she needs to not be involved with toxic people. Clinician actively listened, provided emotional support, validated client's feelings, addressed and processed current events, discussed coping skills, was encouraged to hear that she is maintaining her boundaries and focusing on herself and her boys.  During this session, this clinician used the following therapeutic modalities: Client-centered Therapy and Supportive Psychotherapy    Substance Abuse was not addressed during this session. If the client is diagnosed with a co-occurring substance use disorder, please indicate any changes in the frequency or amount of use: N/A. Stage of change for addressing substance use diagnoses: No substance use/Not applicable    ASSESSMENT:  Tom Choi presents with a Euthymic/ normal mood.     her affect is Normal range and intensity, which is congruent, with her mood and the content of the session. The client has made progress on their goals.     Tom Choi presents with a low risk of suicide, low risk of self-harm, and low risk of harm to others.    For any risk assessment that surpasses a \"low\" rating, a safety plan must be developed.    A safety plan was indicated: no  If yes, describe in detail N/A    PLAN: Between sessions, Tom Choi will continue to utilize coping skills and focus on the needs of herself and children and take medications as prescribed. At the next session, the therapist " will use Client-centered Therapy and Supportive Psychotherapy to address anxiety.    Behavioral Health Treatment Plan and Discharge Planning: Tom Choi is aware of and agrees to continue to work on their treatment plan. They have identified and are working toward their discharge goals. yes    Depression Follow-up Plan Completed: Not applicable    Visit start and stop times:    03/13/25  Start Time: 1400  Stop Time: 1458  Total Visit Time: 58 minutes

## 2025-03-13 NOTE — ASSESSMENT & PLAN NOTE
Variable   Continue Lexapro 5 mg daily to help with anxiety and depressive concerns   Continue Klonopin 0.5 mg daily as needed for anxiety concerns  Recommended continuation of outpatient therapy at [facility]    Orders:    clonazePAM (KlonoPIN) 0.5 mg tablet; Take 1 tablet (0.5 mg total) by mouth daily as needed for anxiety    escitalopram (LEXAPRO) 5 mg tablet; Take 1 tablet (5 mg total) by mouth daily

## 2025-03-25 ENCOUNTER — TELEMEDICINE (OUTPATIENT)
Dept: BEHAVIORAL/MENTAL HEALTH CLINIC | Facility: CLINIC | Age: 35
End: 2025-03-25
Payer: COMMERCIAL

## 2025-03-25 DIAGNOSIS — F41.1 GENERALIZED ANXIETY DISORDER: Primary | ICD-10-CM

## 2025-03-25 PROCEDURE — 90834 PSYTX W PT 45 MINUTES: CPT

## 2025-03-25 NOTE — PSYCH
Virtual Regular VisitName: Tom Choi      : 1990      MRN: [id number]  Encounter Provider: Rita Arredondo  Encounter Date: 3/25/2025   Encounter department: [facility] THERAPIST MENTAL HEALTH OUTPATIENT  :  Assessment & Plan  Generalized anxiety disorder             History of Present Illness     HPI  Review of Systems    Objective   There were no vitals taken for this visit.    Physical Exam    Administrative Statements   Encounter provider Rita Arnulfo    The Patient is located at Home and in the following state in which I [place] an [place] license [place].    The patient was identified by name and date of birth. Tom Choi was informed that this is a telemedicine visit and that the visit is being conducted through the Epic Embedded platform. She agrees to proceed..  My office door was closed. No one else was in the room.  She acknowledged consent and understanding of privacy and security of the video platform. The patient has agreed to participate and understands they can discontinue the visit at any time.    Behavioral Health Psychotherapy Progress Note    Psychotherapy Provided: Individual Psychotherapy     1. Generalized anxiety disorder            Goals addressed in session: Goal 1     DATA: Client presented for a telehealth session via [employer]. Client reported that the twins father was over, stating that he was supposed to have a court hearing today but it had gone through. Client spoke in-length around her twins father and the ongoing frustrations she has with him. She voiced how disappointing, embarrassing and upsetting it is to have someone of his age to act the way he does. She disclosed his immaturity, constant lying, his inability to be a man and how he is not trying for the sake of his kids. Client noted that she continues to call him out and how they would not be in contact with each other if it were not for the boys. Client emphasized how she continues to maintain firm  "boundaries with him and to not fall for his mind games. Clinician actively listened, provided emotional support, validated client's feelings, addressed and processed current events, emphasized the importance of maintaining firm boundaries with him, utilize coping skills and take medications as prescribed.  During this session, this clinician used the following therapeutic modalities: Client-centered Therapy, Motivational Interviewing, and Supportive Psychotherapy    Substance Abuse was not addressed during this session. If the client is diagnosed with a co-occurring substance use disorder, please indicate any changes in the frequency or amount of use: N/A. Stage of change for addressing substance use diagnoses: No substance use/Not applicable    ASSESSMENT:  Tom Choi presents with a Euthymic/ normal mood.     her affect is Normal range and intensity, which is congruent, with her mood and the content of the session. The client has made progress on their goals.     Tom Choi presents with a low risk of suicide, low risk of self-harm, and low risk of harm to others.    For any risk assessment that surpasses a \"low\" rating, a safety plan must be developed.    A safety plan was indicated: no  If yes, describe in detail N/A    PLAN: Between sessions, Tom Choi will continue to utilize coping skills, maintain firm boundaries and take medications as prescribed. At the next session, the therapist will use Client-centered Therapy, Motivational Interviewing, and Supportive Psychotherapy to address anxiety.    Behavioral Health Treatment Plan and Discharge Planning: Tom Choi is aware of and agrees to continue to work on their treatment plan. They have identified and are working toward their discharge goals. yes    Depression Follow-up Plan Completed: Not applicable    Visit start and stop times:    03/25/25  Start Time: 1400  Stop Time: 1441  Total Visit Time: 41 minutes    "

## 2025-04-02 ENCOUNTER — TELEMEDICINE (OUTPATIENT)
Dept: BEHAVIORAL/MENTAL HEALTH CLINIC | Facility: CLINIC | Age: 35
End: 2025-04-02
Payer: COMMERCIAL

## 2025-04-02 DIAGNOSIS — F41.1 GENERALIZED ANXIETY DISORDER: Primary | ICD-10-CM

## 2025-04-02 PROCEDURE — 90837 PSYTX W PT 60 MINUTES: CPT

## 2025-04-02 NOTE — PSYCH
Virtual Regular VisitName: Tom Choi      : 1990      MRN: [id number]  Encounter Provider: Rita Arredondo  Encounter Date: 2025   Encounter department: [facility] THERAPIST MENTAL HEALTH OUTPATIENT  :  Assessment & Plan  Generalized anxiety disorder             History of Present Illness     HPI  Review of Systems    Objective   There were no vitals taken for this visit.    Physical Exam    Administrative Statements   Encounter provider Rita Arredondo    The Patient is located at Home and in the following state in [place] an [place] license [place].    The patient was identified by name and date of birth. Tom Choi was informed that this is a telemedicine visit and that the visit is being conducted through the Epic Embedded platform. She agrees to proceed..  My office door was closed. No one else was in the room.  She acknowledged consent and understanding of privacy and security of the video platform. The patient has agreed to participate and understands they can discontinue the visit at any time.    Behavioral Health Psychotherapy Progress Note    Psychotherapy Provided: Individual Psychotherapy     1. Generalized anxiety disorder            Goals addressed in session: Goal 1     DATA: Client presented for a telehealth session via [employer]. Client reported that her twins will be eight months on Friday. She expressed how fast time is flying and how difficult things have been between her and the twins father. Client spoke in-length around the twins father and how she had officially ended things with him. She voiced that she was tired of hearing the same excuses, the lies and the manipulation/control he was trying to have towards her. Client disclosed that he continued to talk to other girls and share her personal business with them. Client had informed him multiple times how this made her feel and she felt he would continue to dismiss her. Client talked about being fed up and ready  "to move on, put herself first and her children. Client acknowledged that this is going to be hard, but addressed that she did not deserve to keep putting up with him. Client mentioned that she would like to find herself again (before he came into her life) and focus her time and energy on things that matter most. Clinician actively listened, provided emotional support, validated client's feelings, addressed and processed current events, discussed coping skills and to take medications as prescribed.  During this session, this clinician used the following therapeutic modalities: Client-centered Therapy and Supportive Psychotherapy    Substance Abuse was not addressed during this session. If the client is diagnosed with a co-occurring substance use disorder, please indicate any changes in the frequency or amount of use: N/A. Stage of change for addressing substance use diagnoses: No substance use/Not applicable    ASSESSMENT:  Tom Choi presents with a Euthymic/ normal mood.     her affect is Normal range and intensity and Tearful, which is congruent, with her mood and the content of the session. The client has made progress on their goals.     Tom Choi presents with a low risk of suicide, low risk of self-harm, and low risk of harm to others.    For any risk assessment that surpasses a \"low\" rating, a safety plan must be developed.    A safety plan was indicated: no  If yes, describe in detail N/A    PLAN: Between sessions, Tom Choi will continue to utilize coping skills, put herself and kids first and take medications as prescribed. At the next session, the therapist will use Client-centered Therapy and Supportive Psychotherapy to address anxiety.    Behavioral Health Treatment Plan and Discharge Planning: Tom Choi is aware of and agrees to continue to work on their treatment plan. They have identified and are working toward their discharge goals. yes    Depression Follow-up Plan Completed: Not " applicable    Visit start and stop times:    04/02/25  Start Time: 1000  Stop Time: 1053  Total Visit Time: 53 minutes

## 2025-04-04 ENCOUNTER — OFFICE VISIT (OUTPATIENT)
Dept: FAMILY MEDICINE CLINIC | Facility: HOSPITAL | Age: 35
End: 2025-04-04
Payer: COMMERCIAL

## 2025-04-04 VITALS
HEIGHT: 66 IN | HEART RATE: 92 BPM | SYSTOLIC BLOOD PRESSURE: 106 MMHG | DIASTOLIC BLOOD PRESSURE: 70 MMHG | OXYGEN SATURATION: 98 % | WEIGHT: 205 LBS | BODY MASS INDEX: 32.95 KG/M2

## 2025-04-04 DIAGNOSIS — Z13.220 SCREENING FOR HYPERLIPIDEMIA: ICD-10-CM

## 2025-04-04 DIAGNOSIS — F33.1 MODERATE EPISODE OF RECURRENT MAJOR DEPRESSIVE DISORDER (HCC): ICD-10-CM

## 2025-04-04 DIAGNOSIS — F41.1 GAD (GENERALIZED ANXIETY DISORDER): ICD-10-CM

## 2025-04-04 DIAGNOSIS — Z13.0 SCREENING FOR IRON DEFICIENCY ANEMIA: ICD-10-CM

## 2025-04-04 DIAGNOSIS — Z13.29 SCREENING FOR THYROID DISORDER: ICD-10-CM

## 2025-04-04 DIAGNOSIS — R63.5 WEIGHT GAIN: ICD-10-CM

## 2025-04-04 DIAGNOSIS — R63.2 INCREASED APPETITE: ICD-10-CM

## 2025-04-04 DIAGNOSIS — Z00.00 ANNUAL PHYSICAL EXAM: Primary | ICD-10-CM

## 2025-04-04 DIAGNOSIS — Z13.1 SCREENING FOR DIABETES MELLITUS: ICD-10-CM

## 2025-04-04 PROCEDURE — 99214 OFFICE O/P EST MOD 30 MIN: CPT | Performed by: STUDENT IN AN ORGANIZED HEALTH CARE EDUCATION/TRAINING PROGRAM

## 2025-04-04 PROCEDURE — 99395 PREV VISIT EST AGE 18-39: CPT | Performed by: STUDENT IN AN ORGANIZED HEALTH CARE EDUCATION/TRAINING PROGRAM

## 2025-04-04 NOTE — ASSESSMENT & PLAN NOTE
Weekly therapy sessions need to continue as she is struggling postpartum with twins, situational stress with partner, and financial stressors.  Denies suicidal or homicidal ideation at this time.   Orders:  •  TSH, 3rd generation; Future  •  T4, free; Future

## 2025-04-04 NOTE — PROGRESS NOTES
Adult Annual Physical  Name: Tom Choi      : 1990      MRN: [id number]  Encounter Provider: Laurie Sutton DO  Encounter Date: 2025   Encounter department: [facility] PRIMARY CARE [address]    :  Assessment & Plan  Annual physical exam  Patient's medications were reviewed and reconciled.  Ordered/Re-ordered as above.  Topics as below.   Return in about 2 weeks (around 2025) for F/U Chronic DX, review labs, consider phentermine .       Postpartum depression  Saffron & Vit D ( 5000 IU per day ) helpful for mood   Wellbutrin could be helpful if not ( weight neutral )   Orders:  •  TSH, 3rd generation; Future  •  T4, free; Future  •  Vitamin B12; Future    Moderate episode of recurrent major depressive disorder (HCC)  Weekly therapy sessions need to continue as she is struggling postpartum with twins, situational stress with partner, and financial stressors.  Denies suicidal or homicidal ideation at this time.   Orders:  •  TSH, 3rd generation; Future  •  T4, free; Future    NEHEMIAS (generalized anxiety disorder)  As above - lexapro 5 mg qd.       Weight gain  Have blood work done postpartum, and then we can discuss possible medications for weight loss such as phentermine. Insurance unlikely to approve the other options such as injectables.  Orders:  •  TSH, 3rd generation; Future  •  T4, free; Future  •  Vitamin B12; Future  •  Vitamin D 25 hydroxy; Future  •  Cortisol Level, AM Specimen; Future    Screening for diabetes mellitus  Routine labs due.  Orders:  •  Comprehensive metabolic panel; Future    Screening for hyperlipidemia  Yrly screen needed, plus pt has had signif weight gain.   Orders:  •  Lipid Panel with Direct LDL reflex; Future    Screening for iron deficiency anemia  Post-partum check, on depo.   Orders:  •  CBC and differential; Future  •  Fe+TIBC+Gagan; Future    Screening for thyroid disorder  Routine and post-partum check needed, alexis before starting any weight loss meds.    Orders:  •  TSH, 3rd generation; Future  •  T4, free; Future    Increased appetite  Check levels, no klnown etiology for hunger & weight gain. Possibly depo, but didn't due it before.   Could all be cortisol and stress related as well with new twins and life stressors.   Orders:  •  TSH, 3rd generation; Future  •  T4, free; Future  •  Vitamin B12; Future  •  Vitamin D 25 hydroxy; Future  •  Cortisol Level, AM Specimen; Future      Preventive Screenings:  - Diabetes Screening: orders placed  - Cholesterol Screening: orders placed   - Hepatitis C screening: screening up-to-date   - HIV screening: screening up-to-date   - Cervical cancer screening: screening up-to-date   - Colon cancer screening: screening not indicated   - Lung cancer screening: screening not indicated     Immunizations:  - Immunizations due: Influenza    Counseling/Anticipatory Guidance:  - Alcohol: discussed moderation in alcohol intake and recommendations for healthy alcohol use.   - Drug use: discussed harms of illicit drug use and how it can negatively impact mental/physical health.   - Tobacco use: discussed harms of tobacco use and management options for quitting.   - Dental health: discussed importance of regular tooth brushing, flossing, and dental visits.   - Sexual health: discussed sexually transmitted diseases, partner selection, use of condoms, avoidance of unintended pregnancy, and contraceptive alternatives.   - Diet: discussed recommendations for a healthy/well-balanced diet.   - Exercise: the importance of regular exercise/physical activity was discussed. Recommend exercise 3-5 times per week for at least 30 minutes.   - Injury prevention: discussed safety/seat belts, safety helmets, smoke detectors, carbon monoxide detectors, and smoking near bedding or upholstery.       Depression Screening and Follow-up Plan: Patient's depression screening was positive with a PHQ-9 score of 15.   Continue regular follow-up with their mental  health provider who is managing their mental health condition(s).         History of Present Illness     Adult Annual Physical:  Patient presents for annual physical. Twin boys born 8/4/24 Vaginal delivery - 5 lbs each.   Formula fed, not BF   Postpartum pretty hard, seeing psychiatry and therapist at   Always feeling hungry, bad cravings, despite full meals.   Keeps gaining weight, trying to exercise & workout.   Getting SNEED & struggling with exercise due to excess weight..     Diet and Physical Activity:  - Diet/Nutrition: no special diet. Water only, never full  - Exercise: walking. darrell class, limited with babies, only able at times when their dad has them    General Health:  - Sleep: sleeps poorly. up and down, babies sleeping mostly through the night  - Hearing: normal hearing bilateral ears.  - Vision: no vision problems.  - Dental: brushes teeth twice daily and no dental visits for > 1 year.    /GYN Health:  - Follows with GYN: yes.   - Last menstrual cycle: 9/16/2024.   - Contraception:. On depot shot since postpartum, was on prior to babies, didn't have issues with wt gain before while on it      Wt Readings from Last 3 Encounters:   04/04/25 93 kg (205 lb)   10/24/23 78 kg (172 lb)   09/06/23 75.8 kg (167 lb)     Temp Readings from Last 3 Encounters:   12/05/22 (!) 97.1 °F (36.2 °C) (Tympanic)   11/07/22 97.8 °F (36.6 °C) (Tympanic)   07/13/21 98.9 °F (37.2 °C) (Tympanic)     BP Readings from Last 3 Encounters:   04/04/25 106/70   01/04/24 94/72   10/24/23 100/66     Pulse Readings from Last 3 Encounters:   04/04/25 92   01/04/24 84   10/24/23 80     Review of Systems   Constitutional:  Positive for appetite change (inc'd appetite) and unexpected weight change (gaining). Negative for chills and fever.   HENT:  Negative for rhinorrhea and sore throat.    Respiratory:  Positive for cough (thought reflux & acid induced at times). Negative for shortness of breath.         SNEED +    Cardiovascular:   "Negative for chest pain and palpitations.   Gastrointestinal:  Positive for diarrhea (occas, intermittent). Negative for constipation.   Genitourinary:  Negative for dysuria and urgency.   Neurological:  Positive for headaches (sometimes with eating). Negative for dizziness and light-headedness.     Current Outpatient Medications on File Prior to Visit   Medication Sig Dispense Refill   • medroxyPROGESTERone (DEPO-PROVERA) 150 mg/mL injection INJECT 1ML INTO THE SHOULDER, THIGH OR BUTTOCKS EVERY 3 MONTHS       No current facility-administered medications on file prior to visit.      Social History     Tobacco Use   • Smoking status: Never   • Smokeless tobacco: Never   Vaping Use   • Vaping status: Never Used   Substance and Sexual Activity   • Alcohol use: No   • Drug use: No   • Sexual activity: Yes     Birth control/protection: Injection     Comment: unprotected sexual intercourse     Objective   /70   Pulse 92   Ht 5' 6\" (1.676 m)   Wt 93 kg (205 lb)   SpO2 98%   BMI 33.09 kg/m²     Physical Exam  Vitals and nursing note reviewed.   Constitutional:       General: She is not in acute distress.     Appearance: Normal appearance. She is well-developed. She is obese. She is not ill-appearing.   HENT:      Head: Normocephalic and atraumatic.      Right Ear: Tympanic membrane, ear canal and external ear normal. There is no impacted cerumen.      Left Ear: Tympanic membrane, ear canal and external ear normal. There is no impacted cerumen.      Nose: Nose normal. No congestion or rhinorrhea.      Mouth/Throat:      Mouth: Mucous membranes are moist.      Pharynx: Oropharynx is clear. No oropharyngeal exudate or posterior oropharyngeal erythema.   Eyes:      General: No scleral icterus.        Right eye: No discharge.         Left eye: No discharge.      Conjunctiva/sclera: Conjunctivae normal.   Neck:      Comments: No thyroid nodules or thyromegaly.  Cardiovascular:      Rate and Rhythm: Normal rate and " regular rhythm.      Pulses: Normal pulses.      Heart sounds: Normal heart sounds. No murmur heard.  Pulmonary:      Effort: Pulmonary effort is normal. No respiratory distress.      Breath sounds: Normal breath sounds. No wheezing.   Abdominal:      Tenderness: There is no right CVA tenderness or left CVA tenderness.   Musculoskeletal:         General: Normal range of motion.      Cervical back: Normal range of motion and neck supple. No rigidity or tenderness.      Right lower leg: No edema.      Left lower leg: No edema.   Lymphadenopathy:      Cervical: No cervical adenopathy.   Skin:     General: Skin is warm and dry.      Capillary Refill: Capillary refill takes less than 2 seconds.      Findings: No erythema or rash.   Neurological:      Mental Status: She is alert and oriented to person, place, and time.      Gait: Gait normal.   Psychiatric:         Mood and Affect: Mood normal.         Behavior: Behavior normal.         Thought Content: Thought content normal.         Judgment: Judgment normal.

## 2025-04-04 NOTE — ASSESSMENT & PLAN NOTE
Saffron & Vit D ( 5000 IU per day ) helpful for mood   Wellbutrin could be helpful if not ( weight neutral )   Orders:  •  TSH, 3rd generation; Future  •  T4, free; Future  •  Vitamin B12; Future

## 2025-04-04 NOTE — PATIENT INSTRUCTIONS
Saffron & Vit D ( 5000 IU per day ) helpful for mood   Wellbutrin could be helpful if not ( weight neutral )     Phentermine for weight loss, pill daily, Can help decrease appetite

## 2025-04-07 ENCOUNTER — TELEPHONE (OUTPATIENT)
Dept: BEHAVIORAL/MENTAL HEALTH CLINIC | Facility: CLINIC | Age: 35
End: 2025-04-07

## 2025-04-09 ENCOUNTER — TELEPHONE (OUTPATIENT)
Dept: OTHER | Facility: OTHER | Age: 35
End: 2025-04-09
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
## 2025-04-10 NOTE — TELEPHONE ENCOUNTER
Patient is calling regarding cancelling an appointment.    Date/Time: 04/10  @900a     Patient was rescheduled: YES [] NO [x]    Patient requesting call back to reschedule: YES [x] NO []

## 2025-04-11 ENCOUNTER — TELEMEDICINE (OUTPATIENT)
Dept: PSYCHIATRY | Facility: CLINIC | Age: 35
End: 2025-04-11
Payer: COMMERCIAL

## 2025-04-11 DIAGNOSIS — F33.1 MODERATE EPISODE OF RECURRENT MAJOR DEPRESSIVE DISORDER (HCC): Primary | ICD-10-CM

## 2025-04-11 DIAGNOSIS — F41.1 GAD (GENERALIZED ANXIETY DISORDER): ICD-10-CM

## 2025-04-11 PROCEDURE — 99214 OFFICE O/P EST MOD 30 MIN: CPT

## 2025-04-11 RX ORDER — CLONAZEPAM 0.5 MG/1
0.5 TABLET ORAL DAILY PRN
Qty: 30 TABLET | Refills: 0 | Status: SHIPPED | OUTPATIENT
Start: 2025-04-11

## 2025-04-11 RX ORDER — ESCITALOPRAM OXALATE 5 MG/1
5 TABLET ORAL DAILY
Qty: 30 TABLET | Refills: 1 | Status: SHIPPED | OUTPATIENT
Start: 2025-04-11

## 2025-04-11 NOTE — ASSESSMENT & PLAN NOTE
Generally stable   Continue Lexapro 5 mg daily to help with anxiety and depressive concerns  Recommended continuation of outpatient therapy at [facility] [place]    Orders:    escitalopram (LEXAPRO) 5 mg tablet; Take 1 tablet (5 mg total) by mouth daily

## 2025-04-11 NOTE — ASSESSMENT & PLAN NOTE
Generally stable   Continue Lexapro 5 mg daily to help with anxiety and depressive concerns   Continue Klonopin 0.5 mg daily as needed for anxiety concerns  Recommended continuation of outpatient therapy at [facility]    Orders:    escitalopram (LEXAPRO) 5 mg tablet; Take 1 tablet (5 mg total) by mouth daily    clonazePAM (KlonoPIN) 0.5 mg tablet; Take 1 tablet (0.5 mg total) by mouth daily as needed for anxiety

## 2025-04-24 ENCOUNTER — TELEPHONE (OUTPATIENT)
Age: 35
End: 2025-04-24

## 2025-04-24 NOTE — TELEPHONE ENCOUNTER
Patient contacted the office to schedule a follow up visit with provider Rita Arredondo. Patient is now scheduled for 5/7/25 at 1 pm virtually.

## 2025-05-07 ENCOUNTER — TELEMEDICINE (OUTPATIENT)
Dept: BEHAVIORAL/MENTAL HEALTH CLINIC | Facility: CLINIC | Age: 35
End: 2025-05-07
Payer: COMMERCIAL

## 2025-05-07 DIAGNOSIS — F33.0 MAJOR DEPRESSIVE DISORDER, RECURRENT, MILD (HCC): ICD-10-CM

## 2025-05-07 DIAGNOSIS — F41.1 GENERALIZED ANXIETY DISORDER: Primary | ICD-10-CM

## 2025-05-07 PROCEDURE — 90837 PSYTX W PT 60 MINUTES: CPT

## 2025-05-07 NOTE — PSYCH
Virtual Regular VisitName: Tom Choi      : 1990      MRN: [id number]  Encounter Provider: Rita Arredondo  Encounter Date: 2025   Encounter department: [facility] THERAPIST MENTAL HEALTH OUTPATIENT  :  Assessment & Plan  Generalized anxiety disorder         Major depressive disorder, recurrent, mild (HCC)             History of Present Illness     HPI  Review of Systems    Objective   There were no vitals taken for this visit.    Physical Exam    Administrative Statements   Encounter provider Rita Arredondo    The Patient is located at Home and in the following state in which I hold an active license [place].    The patient was identified by name and date of birth. Tom Choi was informed that this is a telemedicine visit and that the visit is being conducted through the Epic Embedded platform. She agrees to proceed..  My office door was closed. No one else was in the room.  She acknowledged consent and understanding of privacy and security of the video platform. The patient has agreed to participate and understands they can discontinue the visit at any time.    Behavioral Health Psychotherapy Progress Note    Psychotherapy Provided: Individual Psychotherapy     1. Generalized anxiety disorder        2. Major depressive disorder, recurrent, mild (HCC)            Goals addressed in session: Goal 1     DATA: Client presented for a telehealth session. Client reported that she will be sending her boys to day care starting next week. She shared that she registered them and filled out all the necessary paperwork to enroll them. Client expressed that she hopes sending them to day care will help her rebuild herself. Client spoke in-length around wanting to focus on taking care of herself, finding a job and prioritizing her needs for the sake of the boys. Client addressed how she is working on establishing boundaries with the boys father and not allowing him to hold her back from finding a job and  "taking care of herself. She voiced that he has held her back other times and how she wished she never got involved with him. Client talked about having court in a couple weeks regarding her oldest son and his grandmother. Client noted that she is a little nervous for court, but acknowledged that her son's grandmother has nothing on her. Client plans to ask her cousin to accompany her. Clinician actively listened, provided emotional support, validated client's feelings, addressed and processed current events, discussed maintaining boundaries, utilize coping skills and take medications as prescribed.  During this session, this clinician used the following therapeutic modalities: Client-centered Therapy and Supportive Psychotherapy    Substance Abuse was not addressed during this session. If the client is diagnosed with a co-occurring substance use disorder, please indicate any changes in the frequency or amount of use: N/A. Stage of change for addressing substance use diagnoses: No substance use/Not applicable    ASSESSMENT:  Tom Choi presents with a Euthymic/ normal mood.     her affect is Normal range and intensity, which is congruent, with her mood and the content of the session. The client has made progress on their goals.     Tom Choi presents with a low risk of suicide, low risk of self-harm, and low risk of harm to others.    For any risk assessment that surpasses a \"low\" rating, a safety plan must be developed.    A safety plan was indicated: no  If yes, describe in detail N/A    PLAN: Between sessions, Tom Choi will continue to utilize coping skills to manage stress/anxiety. At the next session, the therapist will use Client-centered Therapy and Supportive Psychotherapy to address anxiety.    Behavioral Health Treatment Plan and Discharge Planning: Tom Choi is aware of and agrees to continue to work on their treatment plan. They have identified and are working toward their discharge goals. " yes    Depression Follow-up Plan Completed: Not applicable    Visit start and stop times:    05/07/25  Start Time: 1300  Stop Time: 1355  Total Visit Time: 55 minutes

## 2025-05-07 NOTE — BH TREATMENT PLAN
Outpatient Behavioral Health Psychotherapy Treatment Plan    Tom Choi  1990     Date of Initial Psychotherapy Assessment: February 2023   Date of Current Treatment Plan: 05/07/25  Treatment Plan Target Date: August 2025  Treatment Plan Expiration Date: November 2025    Diagnosis:   1. Generalized anxiety disorder        2. Major depressive disorder, recurrent, mild (HCC)            Area(s) of Need: Continue to manage anxiety and depression    Long Term Goal 1 (in the client's own words): Manage anxiety and depression    Stage of Change: Action    Target Date for completion: August 2025-ongoing     Anticipated therapeutic modalities: CBT, Mindfulness     People identified to complete this goal: Self, clinician       Objective 1: (identify the means of measuring success in meeting the objective): Continue to utilize coping mechanisms to manage symptoms      Objective 2: (identify the means of measuring success in meeting the objective): Learn and develop CBT and Mindfulness skills to assist in managing symptoms of anxiety and depression      Long Term Goal 2 (in the client's own words): Take medications as prescribed    Stage of Change: Action    Target Date for completion: November 2025-ongoing     Anticipated therapeutic modalities: Medication management     People identified to complete this goal: Self, psychiatrist      Objective 1: (identify the means of measuring success in meeting the objective): Meet and participate in regular appointments with psychiatrist to discuss current medications       Objective 2: (identify the means of measuring success in meeting the objective): Take medications as prescribed     Long Term Goal 3 (in the client's own words): N/A    Stage of Change: N/A    Target Date for completion: N/A     Anticipated therapeutic modalities: N/A     People identified to complete this goal: N/A      Objective 1: (identify the means of measuring success in meeting the objective):  N/A      Objective 2: (identify the means of measuring success in meeting the objective): N/A     I am currently under the care of a [facility] psychiatric provider: yes    My [facility] psychiatric provider is: Shena Lockhart    I am currently taking psychiatric medications: Yes, as prescribed    I feel that I will be ready for discharge from mental health care when I reach the following (measurable goal/objective): When I have better managed my overall mental health    For children and adults who have a legal guardian:   Has there been any change to custody orders and/or guardianship status? No. If yes, attach updated documentation.    I have reviewed my Crisis Plan and have been offered a copy of this plan    Behavioral Health Treatment Plan [facility]: Diagnosis and Treatment Plan explained to Tom Choi acknowledges an understanding of their diagnosis. Tom Chio agrees to this treatment plan.    I have been offered a copy of this Treatment Plan. no

## 2025-05-22 ENCOUNTER — TELEMEDICINE (OUTPATIENT)
Dept: BEHAVIORAL/MENTAL HEALTH CLINIC | Facility: CLINIC | Age: 35
End: 2025-05-22

## 2025-05-22 DIAGNOSIS — F41.1 GENERALIZED ANXIETY DISORDER: Primary | ICD-10-CM

## 2025-05-22 NOTE — PSYCH
"Virtual Regular VisitName: Tom Choi      : 1990      MRN: [id number]  Encounter Provider: Rita Arredondo  Encounter Date: 2025   Encounter department: [facility] THERAPIST MENTAL HEALTH OUTPATIENT  :  Assessment & Plan  Generalized anxiety disorder             History of Present Illness     HPI  Review of Systems    Objective   There were no vitals taken for this visit.    Physical Exam    Administrative Statements   Encounter provider Ritagale Arredondo    The Patient is located at Home and in the following state in which I hold an active license [place].    The patient was identified by name and date of birth. Tom Choi was informed that this is a telemedicine visit and that the visit is being conducted through the Epic Embedded platform. She agrees to proceed..  My office door was closed. No one else was in the room.  She acknowledged consent and understanding of privacy and security of the video platform. The patient has agreed to participate and understands they can discontinue the visit at any time.    Behavioral Health Psychotherapy Progress Note    Psychotherapy Provided: Individual Psychotherapy     1. Generalized anxiety disorder            Goals addressed in session: Goal 1     DATA: Client presented for a telehealth session. Client reported feeling very upset, stating that she is still trying to process the events that unfolded. Client disclosed that she overheard a conversation her ex-boyfriend was having with his parents and how his parents were saying \"very mean\" things about her. Client addressed that she felt this was an accident, noting that her ex had received a call from his sister on another line, thought he had hung up their call, but did not realize that she stayed on the line and heard their whole conversation. Based on what was said, client voiced that she did not feel comfortable allowing her twins to be picked up or dropped off at the . She noted that she had " "reached out to the  to get his parents removed from the list. Client spoke in-length regarding this and how she feels betrayed, acknowledging that she thought she had a good/decent relationship with them. Client mentioned that she was in the process of figuring out logistics to remove ex's family from her life completely. Client briefly shared that she has a court hearing tomorrow for her oldest son, noting that she has too much on her mind. Clinician actively listened, provided emotional support, validated client's feelings, addressed and processed current events, discussed coping skills, focus on what is within her control and take medications as prescribed.  During this session, this clinician used the following therapeutic modalities: Client-centered Therapy, Cognitive Behavioral Therapy, and Supportive Psychotherapy    Substance Abuse was not addressed during this session. If the client is diagnosed with a co-occurring substance use disorder, please indicate any changes in the frequency or amount of use: N/A. Stage of change for addressing substance use diagnoses: No substance use/Not applicable    ASSESSMENT:  Tom Choi presents with a Euthymic/ normal mood.     her affect is Normal range and intensity, which is congruent, with her mood and the content of the session. The client has made progress on their goals.     Tom Choi presents with a low risk of suicide, low risk of self-harm, and low risk of harm to others.    For any risk assessment that surpasses a \"low\" rating, a safety plan must be developed.    A safety plan was indicated: no  If yes, describe in detail N/A    PLAN: Between sessions, Tom Choi will continue to utilize coping skills to manage stress/anxiety and take medications as prescribed. At the next session, the therapist will use Client-centered Therapy, Cognitive Behavioral Therapy, and Supportive Psychotherapy to address anxiety.    Behavioral Health Treatment Plan and " Discharge Planning: Tmo Choi is aware of and agrees to continue to work on their treatment plan. They have identified and are working toward their discharge goals. yes    Depression Follow-up Plan Completed: Not applicable    Visit start and stop times:    05/22/25  Start Time: 1500  Stop Time: 1534  Total Visit Time: 34 minutes

## 2025-06-02 ENCOUNTER — TELEMEDICINE (OUTPATIENT)
Dept: BEHAVIORAL/MENTAL HEALTH CLINIC | Facility: CLINIC | Age: 35
End: 2025-06-02
Payer: COMMERCIAL

## 2025-06-02 DIAGNOSIS — F41.1 GENERALIZED ANXIETY DISORDER: Primary | ICD-10-CM

## 2025-06-02 PROCEDURE — 90834 PSYTX W PT 45 MINUTES: CPT

## 2025-06-02 NOTE — PSYCH
"Virtual Regular Visit  Name: Tom Choi      : 1990      MRN: [id number]  Encounter Provider: Rita Arredondo  Encounter Date: 2025   Encounter department: [facility] THERAPIST MENTAL HEALTH OUTPATIENT  :  Assessment & Plan  Generalized anxiety disorder             History of Present Illness     HPI  Review of Systems    Objective   There were no vitals taken for this visit.    Physical Exam    Administrative Statements   Encounter provider Rita Arredondo    The Patient is located at Home and in the following state in which I hold an [place] license [place].    The patient was identified by name and date of birth. Tom Choi was informed that this is a telemedicine visit and that the visit is being conducted through the Epic Embedded platform. She agrees to proceed..  My office door was closed. No one else was in the room.  She acknowledged consent and understanding of privacy and security of the video platform. The patient has agreed to participate and understands they can discontinue the visit at any time.    Behavioral Health Psychotherapy Progress Note    Psychotherapy Provided: Individual Psychotherapy     1. Generalized anxiety disorder            Goals addressed in session: Goal 1     DATA: Client presented for a telehealth session. Client reported that it \"still feels unreal that my cousin is gone.\" She spoke in-length around her cousin, reminiscing the fun times she shared with him. Client disclosed how she felt after receiving the news that her cousin had passed in a car accident. She shared that he was the passenger of someone who ended up having a seizure while driving and went down an embankment. Client voiced that the  is in critical but stable condition. She noted that she knew the  and how most of the family have forgiven him for what had happened. Client addressed that the  is on Friday and she is unsure how she is going to be that day. She shared that she has " "good and bad days. Clinician actively listened, provided emotional support, validated client's feelings, addressed and processed current events, reviewed stages of grief, reflected on coping mechanisms for healing.  During this session, this clinician used the following therapeutic modalities: Bereavement Therapy, Client-centered Therapy, and Supportive Psychotherapy    Substance Abuse was not addressed during this session. If the client is diagnosed with a co-occurring substance use disorder, please indicate any changes in the frequency or amount of use: N/A. Stage of change for addressing substance use diagnoses: No substance use/Not applicable    ASSESSMENT:  Tom Choi presents with a Euthymic/ normal mood.     her affect is Normal range and intensity, which is congruent, with her mood and the content of the session. The client has made progress on their goals.     Tom Choi presents with a low risk of suicide, low risk of self-harm, and low risk of harm to others.    For any risk assessment that surpasses a \"low\" rating, a safety plan must be developed.    A safety plan was indicated: no  If yes, describe in detail N/A    PLAN: Between sessions, Tom Choi will continue to utilize coping skills to manage stress/anxiety, process loss of cousin. At the next session, the therapist will use Bereavement Therapy, Client-centered Therapy, and Supportive Psychotherapy to address bereavement.    Behavioral Health Treatment Plan and Discharge Planning: Tom Choi is aware of and agrees to continue to work on their treatment plan. They have identified and are working toward their discharge goals. yes    Depression Follow-up Plan Completed: Not applicable    Visit start and stop times:    06/02/25  Start Time: 1300  Stop Time: 1345  Total Visit Time: 45 minutes    "
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
## 2025-06-13 ENCOUNTER — TELEMEDICINE (OUTPATIENT)
Dept: PSYCHIATRY | Facility: CLINIC | Age: 35
End: 2025-06-13
Payer: COMMERCIAL

## 2025-06-13 DIAGNOSIS — F41.1 GAD (GENERALIZED ANXIETY DISORDER): ICD-10-CM

## 2025-06-13 DIAGNOSIS — F33.1 MODERATE EPISODE OF RECURRENT MAJOR DEPRESSIVE DISORDER (HCC): Primary | ICD-10-CM

## 2025-06-13 PROCEDURE — 99214 OFFICE O/P EST MOD 30 MIN: CPT

## 2025-06-13 RX ORDER — CLONAZEPAM 0.5 MG/1
0.5 TABLET ORAL DAILY PRN
Qty: 30 TABLET | Refills: 0 | Status: SHIPPED | OUTPATIENT
Start: 2025-06-13

## 2025-06-13 RX ORDER — ESCITALOPRAM OXALATE 5 MG/1
5 TABLET ORAL DAILY
Qty: 30 TABLET | Refills: 1 | Status: SHIPPED | OUTPATIENT
Start: 2025-06-13

## 2025-06-13 NOTE — ASSESSMENT & PLAN NOTE
Generally stable   Continue Lexapro 5 mg daily to help with anxiety and depressive concerns  Recommended continuation of outpatient therapy at [facility]      Orders:    escitalopram (LEXAPRO) 5 mg tablet; Take 1 tablet (5 mg total) by mouth daily

## 2025-06-13 NOTE — ASSESSMENT & PLAN NOTE
Generally stable   Continue Lexapro 5 mg daily to help with anxiety and depressive concerns   Continue Klonopin 0.5 mg daily as needed for anxiety concerns  Recommended continuation of outpatient therapy at [facility]      Orders:    escitalopram (LEXAPRO) 5 mg tablet; Take 1 tablet (5 mg total) by mouth daily    clonazePAM (KlonoPIN) 0.5 mg tablet; Take 1 tablet (0.5 mg total) by mouth daily as needed for anxiety

## 2025-06-16 ENCOUNTER — TELEPHONE (OUTPATIENT)
Age: 35
End: 2025-06-16

## 2025-06-16 NOTE — TELEPHONE ENCOUNTER
Patient is calling regarding cancelling an appointment.    Date/Time: 6/16/2025 at 4 pm virtually    Reason: pt is sick    Patient was rescheduled: YES [x] NO []  If yes, when was Patient reschedule for: 6/20/2025 at 9 am virtually    Patient requesting call back to reschedule: YES [] NO [x]

## 2025-06-18 ENCOUNTER — TELEPHONE (OUTPATIENT)
Dept: PSYCHIATRY | Facility: CLINIC | Age: 35
End: 2025-06-18

## 2025-06-20 ENCOUNTER — TELEMEDICINE (OUTPATIENT)
Dept: BEHAVIORAL/MENTAL HEALTH CLINIC | Facility: CLINIC | Age: 35
End: 2025-06-20
Payer: COMMERCIAL

## 2025-06-20 DIAGNOSIS — F41.1 GENERALIZED ANXIETY DISORDER: Primary | ICD-10-CM

## 2025-06-20 PROCEDURE — 90834 PSYTX W PT 45 MINUTES: CPT
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
## 2025-06-27 ENCOUNTER — TELEMEDICINE (OUTPATIENT)
Dept: BEHAVIORAL/MENTAL HEALTH CLINIC | Facility: CLINIC | Age: 35
End: 2025-06-27
Payer: COMMERCIAL

## 2025-06-27 DIAGNOSIS — F41.1 GENERALIZED ANXIETY DISORDER: Primary | ICD-10-CM

## 2025-06-27 PROCEDURE — 90834 PSYTX W PT 45 MINUTES: CPT
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
## 2025-07-09 ENCOUNTER — TELEPHONE (OUTPATIENT)
Dept: PSYCHIATRY | Facility: CLINIC | Age: 35
End: 2025-07-09

## 2025-07-09 NOTE — TELEPHONE ENCOUNTER
Writer spoke to client to r/s 7/10 appointment with Rita Arredondo due to Rita being out sick. Writer r/s client for 7/17 at 3:00 pm with Rita Arredondo.

## 2025-07-16 ENCOUNTER — TELEPHONE (OUTPATIENT)
Dept: BEHAVIORAL/MENTAL HEALTH CLINIC | Facility: CLINIC | Age: 35
End: 2025-07-16

## 2025-07-17 NOTE — TELEPHONE ENCOUNTER
Patient called and reported that she will call back at later time to reschedule appointment. She reported that she does not know her work schedule yet.

## 2025-08-05 ENCOUNTER — TELEPHONE (OUTPATIENT)
Age: 35
End: 2025-08-05

## 2025-08-08 ENCOUNTER — TELEMEDICINE (OUTPATIENT)
Dept: PSYCHIATRY | Facility: CLINIC | Age: 35
End: 2025-08-08
Payer: COMMERCIAL

## 2025-08-08 DIAGNOSIS — F41.0 PANIC DISORDER: ICD-10-CM

## 2025-08-08 DIAGNOSIS — F41.1 GAD (GENERALIZED ANXIETY DISORDER): ICD-10-CM

## 2025-08-08 DIAGNOSIS — F33.1 MODERATE EPISODE OF RECURRENT MAJOR DEPRESSIVE DISORDER (HCC): Primary | ICD-10-CM

## 2025-08-08 PROCEDURE — 99214 OFFICE O/P EST MOD 30 MIN: CPT

## 2025-08-08 RX ORDER — CLONAZEPAM 0.5 MG/1
0.5 TABLET ORAL DAILY PRN
Qty: 30 TABLET | Refills: 0 | Status: SHIPPED | OUTPATIENT
Start: 2025-08-08

## 2025-08-08 RX ORDER — ESCITALOPRAM OXALATE 5 MG/1
5 TABLET ORAL DAILY
Qty: 30 TABLET | Refills: 1 | Status: SHIPPED | OUTPATIENT
Start: 2025-08-08

## 2025-08-14 ENCOUNTER — SOCIAL WORK (OUTPATIENT)
Dept: BEHAVIORAL/MENTAL HEALTH CLINIC | Facility: CLINIC | Age: 35
End: 2025-08-14
Payer: COMMERCIAL